# Patient Record
Sex: FEMALE | Race: WHITE | NOT HISPANIC OR LATINO | Employment: OTHER | URBAN - METROPOLITAN AREA
[De-identification: names, ages, dates, MRNs, and addresses within clinical notes are randomized per-mention and may not be internally consistent; named-entity substitution may affect disease eponyms.]

---

## 2018-05-02 RX ORDER — ZOLPIDEM TARTRATE 10 MG/1
10 TABLET ORAL NIGHTLY PRN
COMMUNITY
End: 2018-06-11 | Stop reason: SDUPTHER

## 2018-05-03 PROBLEM — Z17.22: Status: ACTIVE | Noted: 2018-05-03

## 2018-05-03 PROBLEM — C50.911: Status: ACTIVE | Noted: 2018-05-03

## 2018-05-03 PROBLEM — Z17.1: Status: ACTIVE | Noted: 2018-05-03

## 2018-05-03 RX ORDER — LANOLIN ALCOHOL/MO/W.PET/CERES
1000 CREAM (GRAM) TOPICAL 2 TIMES DAILY
COMMUNITY
End: 2018-08-14

## 2018-06-01 ENCOUNTER — TRANSCRIBE ORDERS (OUTPATIENT)
Dept: LAB | Age: 68
End: 2018-06-01

## 2018-06-01 ENCOUNTER — APPOINTMENT (OUTPATIENT)
Dept: LAB | Age: 68
End: 2018-06-01
Attending: INTERNAL MEDICINE
Payer: MEDICARE

## 2018-06-01 DIAGNOSIS — M86.9 SAPHO SYNDROME (CMS/HCC): ICD-10-CM

## 2018-06-01 DIAGNOSIS — E13.29: ICD-10-CM

## 2018-06-01 DIAGNOSIS — E53.8 BIOTIN-(PROPIONYL-COA-CARBOXYLASE) LIGASE DEFICIENCY: ICD-10-CM

## 2018-06-01 DIAGNOSIS — Z79.899 NEED FOR PROPHYLACTIC CHEMOTHERAPY: ICD-10-CM

## 2018-06-01 DIAGNOSIS — M65.90 SAPHO SYNDROME (CMS/HCC): ICD-10-CM

## 2018-06-01 DIAGNOSIS — E55.9 AVITAMINOSIS D: Primary | ICD-10-CM

## 2018-06-01 DIAGNOSIS — Z13.220 SCREENING FOR LIPOID DISORDERS: ICD-10-CM

## 2018-06-01 DIAGNOSIS — L70.9 SAPHO SYNDROME (CMS/HCC): ICD-10-CM

## 2018-06-01 DIAGNOSIS — M85.80 SAPHO SYNDROME (CMS/HCC): ICD-10-CM

## 2018-06-01 DIAGNOSIS — E55.9 AVITAMINOSIS D: ICD-10-CM

## 2018-06-01 DIAGNOSIS — L40.3 SAPHO SYNDROME (CMS/HCC): ICD-10-CM

## 2018-06-01 LAB
25(OH)D3 SERPL-MCNC: 30 NG/ML (ref 30–100)
ALBUMIN SERPL-MCNC: 4.3 G/DL (ref 3.4–5)
ALP SERPL-CCNC: 61 IU/L (ref 35–126)
ALT SERPL-CCNC: 11 IU/L (ref 11–54)
ANION GAP SERPL CALC-SCNC: 7 MEQ/L (ref 3–15)
ANISOCYTOSIS BLD QL SMEAR: ABNORMAL
AST SERPL-CCNC: 16 IU/L (ref 15–41)
BASOPHILS # BLD: 0.06 K/UL (ref 0.01–0.1)
BASOPHILS NFR BLD: 1 %
BILIRUB SERPL-MCNC: 0.7 MG/DL (ref 0.3–1.2)
BUN SERPL-MCNC: 16 MG/DL (ref 8–20)
CALCIUM SERPL-MCNC: 9.7 MG/DL (ref 8.9–10.3)
CALCIUM SERPL-MCNC: 9.7 MG/DL (ref 8.9–10.3)
CHLORIDE SERPL-SCNC: 106 MMOL/L (ref 98–109)
CHOLEST SERPL-MCNC: 188 MG/DL
CO2 SERPL-SCNC: 27 MMOL/L (ref 22–32)
CREAT SERPL-MCNC: 0.7 MG/DL (ref 0.6–1.1)
DIFFERENTIAL METHOD BLD: ABNORMAL
EOSINOPHIL # BLD: 0.11 K/UL (ref 0.04–0.36)
EOSINOPHIL NFR BLD: 2 %
ERYTHROCYTE [DISTWIDTH] IN BLOOD BY AUTOMATED COUNT: 17.8 % (ref 11.7–14.4)
GFR SERPL CREATININE-BSD FRML MDRD: >60 ML/MIN/1.73M*2
GLUCOSE SERPL-MCNC: 85 MG/DL (ref 70–99)
HCT VFR BLDCO AUTO: 39 % (ref 35–45)
HDLC SERPL-MCNC: 80 MG/DL
HDLC SERPL: 2.4 {RATIO}
HGB BLD-MCNC: 12.2 G/DL (ref 11.8–15.7)
LDLC SERPL CALC-MCNC: 94 MG/DL
LYMPHOCYTES # BLD: 1.88 K/UL (ref 1.2–3.5)
LYMPHOCYTES NFR BLD: 34 %
MCH RBC QN AUTO: 20.1 PG (ref 28–33.2)
MCHC RBC AUTO-ENTMCNC: 31.3 G/DL (ref 32.2–35.5)
MCV RBC AUTO: 64.3 FL (ref 83–98)
MICROCYTES BLD QL SMEAR: ABNORMAL
MONOCYTES # BLD: 0.17 K/UL (ref 0.28–0.8)
MONOCYTES NFR BLD: 3 %
NEUTS BAND # BLD: 3.32 K/UL (ref 1.7–7)
NEUTS SEG NFR BLD: 60 %
NONHDLC SERPL-MCNC: 108 MG/DL
PDW BLD AUTO: 9.7 FL (ref 9.4–12.3)
PLAT MORPH BLD: NORMAL
PLATELET # BLD AUTO: 289 K/UL (ref 150–369)
PLATELET # BLD EST: ABNORMAL 10*3/UL
POIKILOCYTOSIS BLD QL SMEAR: ABNORMAL
POLYCHROMASIA BLD QL SMEAR: ABNORMAL
POTASSIUM SERPL-SCNC: 4.2 MMOL/L (ref 3.6–5.1)
PROT SERPL-MCNC: 6.8 G/DL (ref 6–8.2)
PTH-INTACT SERPL-MCNC: 29.2 PG/ML (ref 12–88)
RBC # BLD AUTO: 6.07 M/UL (ref 3.93–5.22)
SODIUM SERPL-SCNC: 140 MMOL/L (ref 136–144)
TRIGL SERPL-MCNC: 71 MG/DL (ref 30–149)
TSH SERPL DL<=0.05 MIU/L-ACNC: 1.49 MIU/ML (ref 0.34–5.6)
VIT B12 SERPL-MCNC: 519 PG/ML (ref 180–914)
WBC # BLD AUTO: 5.53 K/UL (ref 3.8–10.5)

## 2018-06-01 PROCEDURE — 83970 ASSAY OF PARATHORMONE: CPT

## 2018-06-01 PROCEDURE — 80053 COMPREHEN METABOLIC PANEL: CPT

## 2018-06-01 PROCEDURE — 80061 LIPID PANEL: CPT

## 2018-06-01 PROCEDURE — 82607 VITAMIN B-12: CPT

## 2018-06-01 PROCEDURE — 82306 VITAMIN D 25 HYDROXY: CPT

## 2018-06-01 PROCEDURE — 36415 COLL VENOUS BLD VENIPUNCTURE: CPT

## 2018-06-01 PROCEDURE — 85025 COMPLETE CBC W/AUTO DIFF WBC: CPT

## 2018-06-01 PROCEDURE — 84443 ASSAY THYROID STIM HORMONE: CPT

## 2018-06-11 RX ORDER — ZOLPIDEM TARTRATE 10 MG/1
10 TABLET ORAL NIGHTLY PRN
Qty: 30 TABLET | Refills: 1 | Status: SHIPPED | OUTPATIENT
Start: 2018-06-11 | End: 2018-09-12 | Stop reason: SDUPTHER

## 2018-06-14 ENCOUNTER — APPOINTMENT (EMERGENCY)
Dept: RADIOLOGY | Facility: HOSPITAL | Age: 68
End: 2018-06-14
Attending: EMERGENCY MEDICINE
Payer: MEDICARE

## 2018-06-14 ENCOUNTER — HOSPITAL ENCOUNTER (EMERGENCY)
Facility: HOSPITAL | Age: 68
Discharge: HOME | End: 2018-06-14
Attending: EMERGENCY MEDICINE
Payer: MEDICARE

## 2018-06-14 VITALS
HEIGHT: 60 IN | RESPIRATION RATE: 18 BRPM | SYSTOLIC BLOOD PRESSURE: 106 MMHG | BODY MASS INDEX: 25.13 KG/M2 | DIASTOLIC BLOOD PRESSURE: 56 MMHG | HEART RATE: 67 BPM | TEMPERATURE: 98.1 F | WEIGHT: 128 LBS | OXYGEN SATURATION: 97 %

## 2018-06-14 DIAGNOSIS — S16.1XXA STRAIN OF NECK MUSCLE, INITIAL ENCOUNTER: ICD-10-CM

## 2018-06-14 DIAGNOSIS — S09.90XA HEAD INJURY, INITIAL ENCOUNTER: Primary | ICD-10-CM

## 2018-06-14 PROCEDURE — 99284 EMERGENCY DEPT VISIT MOD MDM: CPT | Mod: 25

## 2018-06-14 PROCEDURE — 70450 CT HEAD/BRAIN W/O DYE: CPT

## 2018-06-14 PROCEDURE — 72125 CT NECK SPINE W/O DYE: CPT

## 2018-06-14 PROCEDURE — 63700000 HC SELF-ADMINISTRABLE DRUG: Performed by: PHYSICIAN ASSISTANT

## 2018-06-14 RX ORDER — CYCLOBENZAPRINE HCL 10 MG
10 TABLET ORAL ONCE
Status: COMPLETED | OUTPATIENT
Start: 2018-06-14 | End: 2018-06-14

## 2018-06-14 RX ORDER — CYCLOBENZAPRINE HCL 10 MG
5 TABLET ORAL 2 TIMES DAILY PRN
Qty: 15 TABLET | Refills: 0 | Status: SHIPPED | OUTPATIENT
Start: 2018-06-14 | End: 2018-08-14

## 2018-06-14 RX ADMIN — CYCLOBENZAPRINE HYDROCHLORIDE 10 MG: 10 TABLET, FILM COATED ORAL at 23:03

## 2018-06-14 ASSESSMENT — ENCOUNTER SYMPTOMS
HEADACHES: 0
NAUSEA: 1
VOMITING: 0
WOUND: 1
WEAKNESS: 0
NUMBNESS: 0
BACK PAIN: 0
NECK PAIN: 1

## 2018-06-15 ENCOUNTER — TELEPHONE (OUTPATIENT)
Dept: FAMILY MEDICINE | Facility: CLINIC | Age: 68
End: 2018-06-15
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
## 2018-06-15 NOTE — TELEPHONE ENCOUNTER
Spoke with pt and performed ED f/u call.  Pt doing well. Pt fell and hit head, was told she had a mild concussion and to f/u with PCP.  Pt does not feel as though she needs a f/u visit and she is leaving for [place] on Monday. Should she just keep an eye on her sx's and f/u if needed or do you need to fit her in today prior to her traveling?

## 2018-06-15 NOTE — DISCHARGE INSTRUCTIONS
Please return to the emergency department with any new or concerning symptoms. You should take all medications as prescribed.  We have answered all of your questions and you are competent to understand.  You feel comfortable with the plan as stated.     You may have had a concussion today however you do not have symptoms of concussion at this time.  Continue to ice the area.  You should take the muscle relaxer as directed.  He will likely feel more sore tomorrow.  Follow-up with your family doctor as needed.

## 2018-07-05 ENCOUNTER — OFFICE VISIT (OUTPATIENT)
Dept: FAMILY MEDICINE | Facility: CLINIC | Age: 68
End: 2018-07-05
Payer: MEDICARE

## 2018-07-05 VITALS
SYSTOLIC BLOOD PRESSURE: 110 MMHG | DIASTOLIC BLOOD PRESSURE: 70 MMHG | RESPIRATION RATE: 16 BRPM | BODY MASS INDEX: 26.31 KG/M2 | HEART RATE: 72 BPM | WEIGHT: 134 LBS | HEIGHT: 60 IN

## 2018-07-05 DIAGNOSIS — M54.2 NECK PAIN: Primary | ICD-10-CM

## 2018-07-05 PROBLEM — C50.919 MALIGNANT NEOPLASM OF BREAST (CMS/HCC): Status: ACTIVE | Noted: 2018-05-03

## 2018-07-05 PROCEDURE — 99213 OFFICE O/P EST LOW 20 MIN: CPT | Performed by: FAMILY MEDICINE

## 2018-07-05 RX ORDER — VIT C/E/ZN/COPPR/LUTEIN/ZEAXAN 250MG-90MG
CAPSULE ORAL
COMMUNITY
Start: 2017-06-28 | End: 2018-08-14

## 2018-07-05 RX ORDER — MELOXICAM 7.5 MG/1
7.5 TABLET ORAL DAILY
Qty: 30 TABLET | Refills: 0 | Status: SHIPPED | OUTPATIENT
Start: 2018-07-05 | End: 2018-08-02 | Stop reason: SDUPTHER

## 2018-07-05 ASSESSMENT — ENCOUNTER SYMPTOMS
NECK PAIN: 1
PHOTOPHOBIA: 0
HEADACHES: 0
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
## 2018-07-11 PROBLEM — M54.2 NECK PAIN: Status: ACTIVE | Noted: 2018-07-11

## 2018-07-11 ASSESSMENT — ENCOUNTER SYMPTOMS
DIZZINESS: 0
LIGHT-HEADEDNESS: 0

## 2018-07-11 NOTE — ASSESSMENT & PLAN NOTE
Muscle strain from fall  Prescribed Flexeril 5mg 2x day as needed  Prescribed Mobic 7.5mg daily with food  Heat  Light stretches

## 2018-08-02 ENCOUNTER — TELEPHONE (OUTPATIENT)
Dept: FAMILY MEDICINE | Facility: CLINIC | Age: 68
End: 2018-08-02

## 2018-08-02 RX ORDER — MELOXICAM 7.5 MG/1
7.5 TABLET ORAL
Qty: 90 TABLET | Refills: 0 | Status: SHIPPED | OUTPATIENT
Start: 2018-08-02 | End: 2019-01-11 | Stop reason: ALTCHOICE

## 2018-08-02 RX ORDER — MELOXICAM 7.5 MG/1
TABLET ORAL
Qty: 30 TABLET | Refills: 0 | Status: SHIPPED | OUTPATIENT
Start: 2018-08-02 | End: 2018-08-02 | Stop reason: SDUPTHER

## 2018-08-14 ENCOUNTER — HOSPITAL ENCOUNTER (EMERGENCY)
Facility: HOSPITAL | Age: 68
Discharge: HOME | End: 2018-08-14
Attending: EMERGENCY MEDICINE
Payer: MEDICARE

## 2018-08-14 ENCOUNTER — TELEPHONE (OUTPATIENT)
Dept: FAMILY MEDICINE | Facility: CLINIC | Age: 68
End: 2018-08-14

## 2018-08-14 ENCOUNTER — APPOINTMENT (EMERGENCY)
Dept: RADIOLOGY | Facility: HOSPITAL | Age: 68
End: 2018-08-14
Payer: MEDICARE

## 2018-08-14 VITALS
WEIGHT: 130 LBS | OXYGEN SATURATION: 97 % | TEMPERATURE: 98.2 F | SYSTOLIC BLOOD PRESSURE: 125 MMHG | BODY MASS INDEX: 25.52 KG/M2 | HEART RATE: 81 BPM | DIASTOLIC BLOOD PRESSURE: 66 MMHG | HEIGHT: 60 IN | RESPIRATION RATE: 16 BRPM

## 2018-08-14 DIAGNOSIS — S89.92XA KNEE INJURY, LEFT, INITIAL ENCOUNTER: Primary | ICD-10-CM

## 2018-08-14 PROCEDURE — 63700000 HC SELF-ADMINISTRABLE DRUG: Performed by: NURSE PRACTITIONER

## 2018-08-14 PROCEDURE — 99283 EMERGENCY DEPT VISIT LOW MDM: CPT | Mod: 25

## 2018-08-14 PROCEDURE — 73564 X-RAY EXAM KNEE 4 OR MORE: CPT | Mod: LT

## 2018-08-14 RX ORDER — OXYCODONE AND ACETAMINOPHEN 5; 325 MG/1; MG/1
1 TABLET ORAL ONCE
Status: COMPLETED | OUTPATIENT
Start: 2018-08-14 | End: 2018-08-14

## 2018-08-14 RX ORDER — TRAMADOL HYDROCHLORIDE 50 MG/1
50 TABLET ORAL EVERY 8 HOURS PRN
Qty: 15 TABLET | Refills: 0 | Status: SHIPPED | OUTPATIENT
Start: 2018-08-14 | End: 2018-09-12 | Stop reason: ALTCHOICE

## 2018-08-14 RX ORDER — CYCLOBENZAPRINE HCL 10 MG
10 TABLET ORAL 3 TIMES DAILY PRN
Qty: 15 TABLET | Refills: 0 | Status: SHIPPED | OUTPATIENT
Start: 2018-08-14 | End: 2018-09-12 | Stop reason: ALTCHOICE

## 2018-08-14 RX ADMIN — OXYCODONE HYDROCHLORIDE AND ACETAMINOPHEN 1 TABLET: 5; 325 TABLET ORAL at 16:01

## 2018-08-14 ASSESSMENT — ENCOUNTER SYMPTOMS
PHOTOPHOBIA: 0
FEVER: 0
JOINT SWELLING: 1
CHEST TIGHTNESS: 0
HEADACHES: 0
ABDOMINAL PAIN: 0
CONFUSION: 0

## 2018-08-14 NOTE — TELEPHONE ENCOUNTER
Please let patient know that she should see Dr. White for evaluation and they will order the MRI it is up to the specialist seeing the patient whether the MRI would be the next best step.  [facility] has some walk-in hours as well.  They should call first thing in the morning and they may be able to see them tomorrow.

## 2018-08-14 NOTE — DISCHARGE INSTRUCTIONS
Wear the knee immobilizer and use a walker for assistance call and follow-up with orthopedist or primary care within the next few days for further evaluation will likely be needed due to acute injury to the left knee no fracture was noted on the x-ray

## 2018-08-14 NOTE — ED ATTESTATION NOTE
The patient was evaluated and managed by the physician assistant / nurse practitioner.       Hong Cano MD  08/14/18 9855

## 2018-08-14 NOTE — TELEPHONE ENCOUNTER
Call from . Pt is currently in [place] ER for knee problem. Was told she needs an MRI but PCP needs to order.  Per discharge instructions, pt told to call Dr. White in 1 day for appointment. (not sure if he would order the MRI or do we need to?)   Pt of Dr. Lemus.

## 2018-08-14 NOTE — ED PROVIDER NOTES
Chief Complaint   Patient presents with   • Lower Extremity Issue        HPI    Patient presents to emergency room due to acute pain in the left knee posterior states that she was walking down the stairs felt a pop and then had severe pain in the back of her knee unable to ambulate or apply pressure.  The pain is worse with any movement and is relieved with rest she has never injured that knee in the past      Past Medical History:   Diagnosis Date   • Beta thalassemia (CMS/HCC) (HCC)    • Breast cancer (CMS/HCC) (HCC)    • Osteoporosis          Past Surgical History:   Procedure Laterality Date   • EYE SURGERY     • HYSTERECTOMY     • MASTECTOMY         Family History   Problem Relation Age of Onset   • Breast cancer Mother    • Thyroid cancer Mother    • Cancer Father    • Heart disease Paternal Grandmother    • Thyroid disease Paternal Grandfather    • Cancer Paternal Grandfather        Social History   Substance Use Topics   • Smoking status: Never Smoker   • Smokeless tobacco: Never Used   • Alcohol use Yes      Comment: socially       Systems Reviewed from Nursing Triage:              Review of Systems   Constitutional: Negative for fever.   Eyes: Negative for photophobia.   Respiratory: Negative for chest tightness.    Cardiovascular: Negative for chest pain.   Gastrointestinal: Negative for abdominal pain.   Musculoskeletal: Positive for gait problem and joint swelling.   Skin: Negative for rash.   Neurological: Negative for headaches.   Psychiatric/Behavioral: Negative for confusion.            ED Triage Vitals [08/14/18 1554]   Temp Heart Rate Resp BP SpO2   36.8 °C (98.2 °F) 81 16 137/71 98 %      Temp Source Heart Rate Source Patient Position BP Location FiO2 (%) (Set)   Temporal -- Sitting Right upper arm --       Vitals:    08/14/18 1554 08/14/18 1715   BP: 137/71 125/66   BP Location: Right upper arm    Patient Position: Sitting    Pulse: 81    Resp: 16 16   Temp: 36.8 °C (98.2 °F)    TempSrc:  Temporal    SpO2: 98% 97%   Weight: 59 kg (130 lb)    Height: 1.524 m (5')                          Physical Exam   Constitutional: She is oriented to person, place, and time. She appears well-developed and well-nourished.   HENT:   Head: Normocephalic.   Eyes: Conjunctivae are normal.   Neck: Normal range of motion.   Pulmonary/Chest: Effort normal.   Neurological: She is alert and oriented to person, place, and time.   Skin: Skin is warm and dry.   Psychiatric: She has a normal mood and affect.   Nursing note and vitals reviewed.           Labs Reviewed - No data to display    X-RAY KNEE LEFT 4+ VIEWS   Final Result   IMPRESSION:   No significant abnormality.            Procedures    Final diagnoses:   [S89.92XA] Knee injury, left, initial encounter       ED Course & [facility]     ED Course as of Aug 16 1529   Tue Aug 14, 2018   1630 No acute findings on xray   Will need follow up MRI and ortho  Knee immoblizer applied  Walker -  [LB]      ED Course User Index  [LB] Cristel Albright CRNP         Clinical Impressions as of Aug 16 1529   Knee injury, left, initial encounter           [facility]    3:29 PM    Impression: lt knee pain    Plan: xray pain control    MORA Merritt  8/16/2018           Cristel Albright CRNP  08/16/18 1529

## 2018-09-12 ENCOUNTER — OFFICE VISIT (OUTPATIENT)
Dept: FAMILY MEDICINE | Facility: CLINIC | Age: 68
End: 2018-09-12
Payer: MEDICARE

## 2018-09-12 VITALS
HEART RATE: 76 BPM | RESPIRATION RATE: 16 BRPM | WEIGHT: 134 LBS | TEMPERATURE: 98.1 F | HEIGHT: 60 IN | DIASTOLIC BLOOD PRESSURE: 76 MMHG | SYSTOLIC BLOOD PRESSURE: 120 MMHG | BODY MASS INDEX: 26.31 KG/M2

## 2018-09-12 DIAGNOSIS — M25.562 CHRONIC PAIN OF LEFT KNEE: Primary | ICD-10-CM

## 2018-09-12 DIAGNOSIS — G89.29 CHRONIC PAIN OF LEFT KNEE: Primary | ICD-10-CM

## 2018-09-12 DIAGNOSIS — G47.00 INSOMNIA, UNSPECIFIED TYPE: ICD-10-CM

## 2018-09-12 PROCEDURE — 99213 OFFICE O/P EST LOW 20 MIN: CPT | Performed by: FAMILY MEDICINE

## 2018-09-12 RX ORDER — AZITHROMYCIN 250 MG/1
TABLET, FILM COATED ORAL
Qty: 6 TABLET | Refills: 0 | Status: SHIPPED | OUTPATIENT
Start: 2018-09-12 | End: 2018-12-14 | Stop reason: SDUPTHER

## 2018-09-12 RX ORDER — ZOLPIDEM TARTRATE 10 MG/1
10 TABLET ORAL NIGHTLY PRN
Qty: 30 TABLET | Refills: 0 | Status: SHIPPED | OUTPATIENT
Start: 2018-09-12 | End: 2019-01-24 | Stop reason: SDUPTHER

## 2018-09-19 PROBLEM — G47.00 INSOMNIA: Status: ACTIVE | Noted: 2018-09-19

## 2018-09-19 PROBLEM — M25.562 CHRONIC PAIN OF LEFT KNEE: Status: ACTIVE | Noted: 2018-09-19

## 2018-09-19 PROBLEM — G89.29 CHRONIC PAIN OF LEFT KNEE: Status: ACTIVE | Noted: 2018-09-19

## 2018-09-19 ASSESSMENT — ENCOUNTER SYMPTOMS
ABDOMINAL PAIN: 0
LIGHT-HEADEDNESS: 0
PALPITATIONS: 0
HEADACHES: 0
ARTHRALGIAS: 1
DIZZINESS: 0
COUGH: 0
FEVER: 0
SHORTNESS OF BREATH: 0
CHILLS: 0

## 2018-09-19 NOTE — PROGRESS NOTES
Subjective      Patient ID: Ruthy Kaminski is a 68 y.o. female.  1950      Patient presents for follow-up to her left knee pain.  She was seen by orthopedic doctor and diagnosed with a large Baker's cyst and arthritis.  She did have her knee injected.  It is feeling better.  She will be traveling abroad for 14 days.  She would like to take an anti-inflammatory with her while she is away.  She is also concerned about jet lag and like to take prescription of Ambien with her as well.        The following have been reviewed and updated as appropriate in this visit:  Tobacco  Meds  Problems  Med Hx  Surg Hx  Fam Hx  Soc Hx      Review of Systems   Constitutional: Negative for chills and fever.   Respiratory: Negative for cough and shortness of breath.    Cardiovascular: Negative for chest pain, palpitations and leg swelling.   Gastrointestinal: Negative for abdominal pain.   Musculoskeletal: Positive for arthralgias.   Neurological: Negative for dizziness, light-headedness and headaches.       Objective     Vitals:    09/12/18 1456   BP: 120/76   Pulse: 76   Resp: 16   Temp: 36.7 °C (98.1 °F)   Weight: 60.8 kg (134 lb)   Height: 1.524 m (5')     Body mass index is 26.17 kg/m².    Physical Exam   Constitutional: She appears well-developed and well-nourished.   Cardiovascular: Normal rate, regular rhythm and normal heart sounds.    No murmur heard.  Pulmonary/Chest: Effort normal and breath sounds normal. No respiratory distress. She has no wheezes.   Abdominal: Soft. Bowel sounds are normal. She exhibits no distension. There is no tenderness.       Assessment/Plan   Problem List Items Addressed This Visit     Chronic pain of left knee - Primary     Baker's cyst and arthritis  Prescribed Mobic 7.5mg daily with food         Insomnia     Jet lag from travel  Prescribed Ambien 10mg, 1/2 tablet as needed

## 2018-10-10 ENCOUNTER — APPOINTMENT (OUTPATIENT)
Dept: LAB | Facility: HOSPITAL | Age: 68
End: 2018-10-10
Attending: ANESTHESIOLOGY
Payer: MEDICARE

## 2018-10-10 ENCOUNTER — TRANSCRIBE ORDERS (OUTPATIENT)
Dept: REGISTRATION | Facility: HOSPITAL | Age: 68
End: 2018-10-10

## 2018-10-10 ENCOUNTER — HOSPITAL ENCOUNTER (OUTPATIENT)
Dept: CARDIOLOGY | Facility: HOSPITAL | Age: 68
Discharge: HOME | End: 2018-10-10
Attending: ANESTHESIOLOGY
Payer: MEDICARE

## 2018-10-10 DIAGNOSIS — Z01.812 ENCOUNTER FOR PREPROCEDURAL LABORATORY EXAMINATION: ICD-10-CM

## 2018-10-10 DIAGNOSIS — Z01.812 ENCOUNTER FOR PREPROCEDURAL LABORATORY EXAMINATION: Primary | ICD-10-CM

## 2018-10-10 DIAGNOSIS — Z01.818 ENCOUNTER FOR OTHER PREPROCEDURAL EXAMINATION: ICD-10-CM

## 2018-10-10 LAB
ANION GAP SERPL CALC-SCNC: 10 MEQ/L (ref 3–15)
BUN SERPL-MCNC: 19 MG/DL (ref 8–20)
CALCIUM SERPL-MCNC: 9.8 MG/DL (ref 8.9–10.3)
CHLORIDE SERPL-SCNC: 101 MEQ/L (ref 98–109)
CO2 SERPL-SCNC: 26 MEQ/L (ref 22–32)
CREAT SERPL-MCNC: 0.7 MG/DL (ref 0.6–1.1)
GFR SERPL CREATININE-BSD FRML MDRD: >60 ML/MIN/1.73M*2
GLUCOSE SERPL-MCNC: 94 MG/DL (ref 70–99)
POTASSIUM SERPL-SCNC: 4.3 MEQ/L (ref 3.6–5.1)
SODIUM SERPL-SCNC: 137 MEQ/L (ref 136–144)

## 2018-10-10 PROCEDURE — 93005 ELECTROCARDIOGRAM TRACING: CPT

## 2018-10-10 PROCEDURE — 80048 BASIC METABOLIC PNL TOTAL CA: CPT

## 2018-10-10 PROCEDURE — 36415 COLL VENOUS BLD VENIPUNCTURE: CPT

## 2018-10-11 LAB
ATRIAL RATE: 69
P AXIS: 74
PR INTERVAL: 172
QRS DURATION: 76
QT INTERVAL: 414
QTC CALCULATION(BAZETT): 443
R AXIS: 27
T WAVE AXIS: 44
VENTRICULAR RATE: 69

## 2018-10-15 ENCOUNTER — OFFICE VISIT (OUTPATIENT)
Dept: FAMILY MEDICINE | Facility: CLINIC | Age: 68
End: 2018-10-15
Payer: MEDICARE

## 2018-10-15 VITALS
TEMPERATURE: 98 F | WEIGHT: 133.8 LBS | DIASTOLIC BLOOD PRESSURE: 72 MMHG | HEIGHT: 60 IN | HEART RATE: 88 BPM | SYSTOLIC BLOOD PRESSURE: 102 MMHG | BODY MASS INDEX: 26.27 KG/M2 | RESPIRATION RATE: 16 BRPM

## 2018-10-15 DIAGNOSIS — J20.8 ACUTE VIRAL BRONCHITIS: Primary | ICD-10-CM

## 2018-10-15 DIAGNOSIS — C50.919 MALIGNANT NEOPLASM OF FEMALE BREAST, UNSPECIFIED ESTROGEN RECEPTOR STATUS, UNSPECIFIED LATERALITY, UNSPECIFIED SITE OF BREAST (CMS/HCC): ICD-10-CM

## 2018-10-15 PROCEDURE — 99213 OFFICE O/P EST LOW 20 MIN: CPT | Performed by: FAMILY MEDICINE

## 2018-10-15 NOTE — PROGRESS NOTES
Subjective      Patient ID: Ruthy Kaminski is a 68 y.o. female.    HPI patient reports that roughly 2 weeks ago while on a cruise she developed sore throat which progressed to runny nose nasal congestion and a cough.  She took Zithromax for 5 days which she finished roughly a week ago.  The patient continues to have a cough.  Her runny nose nasal congestion and sinus symptoms are improving significantly.  Her cough is slightly phlegmy at times.  She does not have any chest pain, shortness of breath, or fever.    Allergies  Meds  Problems       Review of Systems  Allergies   Allergen Reactions   • Keflex [Cephalexin]    • Penicillin G    • Cephalexin Monohydrate Rash     KEFLEX   • Sulfa (Sulfonamide Antibiotics) Rash     Current Outpatient Prescriptions   Medication Sig Dispense Refill   • meloxicam (MOBIC) 7.5 mg tablet Take 1 tablet (7.5 mg total) by mouth once daily. 90 tablet 0   • zolpidem (AMBIEN) 10 mg tablet Take 1 tablet (10 mg total) by mouth nightly as needed for sleep. 30 tablet 0     No current facility-administered medications for this visit.      Past Medical History:   Diagnosis Date   • Beta thalassemia (CMS/HCC)    • Breast cancer (CMS/HCC) ([facility])    • Osteoporosis      Past Surgical History:   Procedure Laterality Date   • EYE SURGERY     • HYSTERECTOMY     • MASTECTOMY       Social History   Substance Use Topics   • Smoking status: Never Smoker   • Smokeless tobacco: Never Used   • Alcohol use Yes      Comment: socially     Family History   Problem Relation Age of Onset   • Breast cancer Mother    • Thyroid cancer Mother    • Polymyalgia rheumatica Mother    • Cancer Father    • Heart disease Paternal Grandmother    • Thyroid disease Paternal Grandfather    • Cancer Paternal Grandfather        Objective   Vitals:    10/15/18 1511   BP: 102/72   Pulse: 88   Resp: 16   Temp: 36.7 °C (98 °F)   Weight: 60.7 kg (133 lb 12.8 oz)   Height: 1.524 m (5')    Body mass index is 26.13 kg/m².  Physical Exam    Constitutional: She appears well-developed.  Non-toxic appearance. No distress.   HENT:   Head: Normocephalic.   Right Ear: Tympanic membrane, external ear and ear canal normal.   Left Ear: Tympanic membrane, external ear and ear canal normal.   Nose: Nose normal.   Mouth/Throat: Uvula is midline, oropharynx is clear and moist and mucous membranes are normal. No oral lesions. No oropharyngeal exudate, posterior oropharyngeal edema, posterior oropharyngeal erythema or tonsillar abscesses.   Tongue normal   Eyes: Conjunctivae and lids are normal.   Neck: Neck supple. No thyroid mass and no thyromegaly present.   Cardiovascular: Normal rate, regular rhythm and normal heart sounds.    No murmur heard.  Pulmonary/Chest: Effort normal and breath sounds normal. No respiratory distress.   Lymphadenopathy:        Head (right side): No submental, no submandibular, no preauricular, no posterior auricular and no occipital adenopathy present.        Head (left side): No submental, no submandibular, no preauricular, no posterior auricular and no occipital adenopathy present.     She has no cervical adenopathy.   Psychiatric: She has a normal mood and affect.   Alert and oriented       Assessment/Plan   Problem List Items Addressed This Visit     Malignant neoplasm of breast (CMS/HCC) (HCC)      Other Visit Diagnoses     Acute viral bronchitis    -  Primary      Patient's chronic conditions are stable although she did have rupture of implants and has reconstructive surgery planned in a little over a week.  I believe the patient's symptoms are all viral, inflammatory in nature, her exam was normal and she has been treated already with Zithromax.  Therefore I recommend supportive treatment and expect resolution in the next week.  If symptoms do not soon improve, follow-up for further evaluation.    Patient agrees and verbalizes understanding of medication and treatment plan discussed at today's visit.  Patient was instructed to  call or follow-up if symptoms persist or worsen.    No notes on file    Jayme Donald, DO    This note was created with voice recognition software. Inadvertent dictation errors should be disregarded. Please contact my office for clarification.

## 2018-10-23 PROCEDURE — 88304 TISSUE EXAM BY PATHOLOGIST: CPT | Performed by: PLASTIC SURGERY

## 2018-10-24 ENCOUNTER — LAB REQUISITION (OUTPATIENT)
Dept: LAB | Facility: HOSPITAL | Age: 68
End: 2018-10-24
Payer: MEDICARE

## 2018-10-24 DIAGNOSIS — Z85.3 PERSONAL HISTORY OF MALIGNANT NEOPLASM OF BREAST: ICD-10-CM

## 2018-10-25 LAB
CASE RPRT: NORMAL
CLINICAL INFO: NORMAL
PATH REPORT.FINAL DX SPEC: NORMAL
PATH REPORT.GROSS SPEC: NORMAL

## 2018-11-27 ENCOUNTER — TRANSCRIBE ORDERS (OUTPATIENT)
Dept: SCHEDULING | Age: 68
End: 2018-11-27

## 2018-11-27 DIAGNOSIS — M85.80 OTHER SPECIFIED DISORDERS OF BONE DENSITY AND STRUCTURE, UNSPECIFIED SITE: Primary | ICD-10-CM

## 2018-12-14 RX ORDER — AZITHROMYCIN 250 MG/1
TABLET, FILM COATED ORAL
Qty: 6 TABLET | Refills: 0 | Status: SHIPPED | OUTPATIENT
Start: 2018-12-14 | End: 2019-08-21 | Stop reason: SDUPTHER

## 2018-12-14 RX ORDER — ALPRAZOLAM 0.25 MG/1
0.25 TABLET ORAL 2 TIMES DAILY PRN
Qty: 20 TABLET | Refills: 0 | Status: CANCELLED | OUTPATIENT
Start: 2018-12-14 | End: 2019-01-13

## 2018-12-14 RX ORDER — AZITHROMYCIN 250 MG/1
TABLET, FILM COATED ORAL
Qty: 6 TABLET | Refills: 0 | Status: CANCELLED | OUTPATIENT
Start: 2018-12-14 | End: 2018-12-19

## 2018-12-14 RX ORDER — ALPRAZOLAM 0.25 MG/1
0.25 TABLET ORAL 2 TIMES DAILY PRN
Qty: 20 TABLET | Refills: 0 | Status: SHIPPED | OUTPATIENT
Start: 2018-12-14 | End: 2019-05-20 | Stop reason: SDUPTHER

## 2019-01-07 ENCOUNTER — APPOINTMENT (OUTPATIENT)
Dept: LAB | Age: 69
End: 2019-01-07
Attending: INTERNAL MEDICINE
Payer: MEDICARE

## 2019-01-07 ENCOUNTER — TRANSCRIBE ORDERS (OUTPATIENT)
Dept: LAB | Age: 69
End: 2019-01-07

## 2019-01-07 DIAGNOSIS — C50.911 MALIGNANT NEOPLASM OF RIGHT FEMALE BREAST (CMS/HCC): Primary | ICD-10-CM

## 2019-01-07 DIAGNOSIS — C50.912 MALIGNANT NEOPLASM OF LEFT FEMALE BREAST (CMS/HCC): ICD-10-CM

## 2019-01-07 DIAGNOSIS — C50.911 MALIGNANT NEOPLASM OF RIGHT FEMALE BREAST (CMS/HCC): ICD-10-CM

## 2019-01-07 LAB
ALBUMIN SERPL-MCNC: 4.3 G/DL (ref 3.4–5)
ALP SERPL-CCNC: 64 IU/L (ref 35–126)
ALT SERPL-CCNC: 12 IU/L (ref 11–54)
ANION GAP SERPL CALC-SCNC: 9 MEQ/L (ref 3–15)
ANISOCYTOSIS BLD QL SMEAR: NORMAL
AST SERPL-CCNC: 19 IU/L (ref 15–41)
BASOPHILS # BLD: 0.04 K/UL (ref 0.01–0.1)
BASOPHILS NFR BLD: 0.6 %
BILIRUB SERPL-MCNC: 0.6 MG/DL (ref 0.3–1.2)
BUN SERPL-MCNC: 13 MG/DL (ref 8–20)
CALCIUM SERPL-MCNC: 9.7 MG/DL (ref 8.9–10.3)
CHLORIDE SERPL-SCNC: 103 MEQ/L (ref 98–109)
CO2 SERPL-SCNC: 27 MEQ/L (ref 22–32)
CREAT SERPL-MCNC: 0.8 MG/DL
DIFFERENTIAL METHOD BLD: NORMAL
EOSINOPHIL # BLD: 0.08 K/UL (ref 0.04–0.36)
EOSINOPHIL NFR BLD: 1.2 %
ERYTHROCYTE [DISTWIDTH] IN BLOOD BY AUTOMATED COUNT: 17.6 % (ref 11.7–14.4)
GFR SERPL CREATININE-BSD FRML MDRD: >60 ML/MIN/1.73M*2
GLUCOSE SERPL-MCNC: 94 MG/DL (ref 70–99)
HCT VFR BLDCO AUTO: 40.4 %
HGB BLD-MCNC: 12.4 G/DL
IMM GRANULOCYTES # BLD AUTO: 0.01 K/UL (ref 0–0.08)
IMM GRANULOCYTES NFR BLD AUTO: 0.1 %
LYMPHOCYTES # BLD: 2.23 K/UL (ref 1.2–3.5)
LYMPHOCYTES NFR BLD: 33.3 %
MCH RBC QN AUTO: 19.8 PG (ref 28–33.2)
MCHC RBC AUTO-ENTMCNC: 30.7 G/DL (ref 32.2–35.5)
MCV RBC AUTO: 64.4 FL (ref 83–98)
MICROCYTES BLD QL SMEAR: NORMAL
MONOCYTES # BLD: 0.4 K/UL (ref 0.28–0.8)
MONOCYTES NFR BLD: 6 %
NEUTROPHILS # BLD: 3.94 K/UL (ref 1.7–7)
NEUTS SEG NFR BLD: 58.8 %
NRBC BLD-RTO: 0 %
PDW BLD AUTO: 10 FL (ref 9.4–12.3)
PLAT MORPH BLD: NORMAL
PLATELET # BLD AUTO: 308 K/UL
PLATELET # BLD EST: NORMAL 10*3/UL
POIKILOCYTOSIS BLD QL SMEAR: NORMAL
POLYCHROMASIA BLD QL SMEAR: NORMAL
POTASSIUM SERPL-SCNC: 4 MEQ/L (ref 3.6–5.1)
PROT SERPL-MCNC: 7.1 G/DL (ref 6–8.2)
RBC # BLD AUTO: 6.27 M/UL (ref 3.93–5.22)
SODIUM SERPL-SCNC: 139 MEQ/L (ref 136–144)
TARGETS BLD QL SMEAR: NORMAL
WBC # BLD AUTO: 6.7 K/UL

## 2019-01-07 PROCEDURE — 80053 COMPREHEN METABOLIC PANEL: CPT

## 2019-01-07 PROCEDURE — 85025 COMPLETE CBC W/AUTO DIFF WBC: CPT

## 2019-01-07 PROCEDURE — 36415 COLL VENOUS BLD VENIPUNCTURE: CPT

## 2019-01-11 ENCOUNTER — OFFICE VISIT (OUTPATIENT)
Dept: HEMATOLOGY/ONCOLOGY | Facility: CLINIC | Age: 69
End: 2019-01-11
Attending: INTERNAL MEDICINE
Payer: MEDICARE

## 2019-01-11 VITALS
TEMPERATURE: 98.4 F | HEART RATE: 62 BPM | BODY MASS INDEX: 24.65 KG/M2 | WEIGHT: 126.2 LBS | SYSTOLIC BLOOD PRESSURE: 111 MMHG | DIASTOLIC BLOOD PRESSURE: 82 MMHG

## 2019-01-11 DIAGNOSIS — C50.912 MALIGNANT NEOPLASM OF LEFT BREAST IN FEMALE, ESTROGEN RECEPTOR NEGATIVE, UNSPECIFIED SITE OF BREAST (CMS/HCC): Primary | ICD-10-CM

## 2019-01-11 DIAGNOSIS — Z17.1 MALIGNANT NEOPLASM OF LEFT BREAST IN FEMALE, ESTROGEN RECEPTOR NEGATIVE, UNSPECIFIED SITE OF BREAST (CMS/HCC): Primary | ICD-10-CM

## 2019-01-11 PROCEDURE — 99214 OFFICE O/P EST MOD 30 MIN: CPT | Performed by: INTERNAL MEDICINE

## 2019-01-11 ASSESSMENT — ENCOUNTER SYMPTOMS
FEVER: 0
HOT FLASHES: 0
RESPIRATORY NEGATIVE: 1
APPETITE CHANGE: 0
CARDIOVASCULAR NEGATIVE: 1
FATIGUE: 1
NERVOUS/ANXIOUS: 0
UNEXPECTED WEIGHT CHANGE: 0
HEMATOLOGIC/LYMPHATIC NEGATIVE: 1
GASTROINTESTINAL NEGATIVE: 1
DIAPHORESIS: 0
PSYCHIATRIC NEGATIVE: 1
NEUROLOGICAL NEGATIVE: 1
CHILLS: 0

## 2019-01-11 ASSESSMENT — PAIN SCALES - GENERAL: PAINLEVEL: 5

## 2019-01-11 NOTE — PROGRESS NOTES
Review of Systems   Constitutional: Positive for fatigue. Negative for appetite change, chills, diaphoresis, fever and unexpected weight change.   HENT:  Negative.    Respiratory: Negative.    Cardiovascular: Negative.    Gastrointestinal: Negative.    Endocrine: Negative for hot flashes.   Genitourinary: Negative.     Musculoskeletal:        Ache behind L breast - worse when laying flat started a few weeks ago   Skin: Negative.    Neurological: Negative.    Hematological: Negative.    Psychiatric/Behavioral: Negative.  Depression: recent loss of father before holidays. Sleep disturbance: ambien helps. The patient is not nervous/anxious.      Had implants replaced in the Fall by Dr. Pittman. Has f/u appt with Dr. Ann.

## 2019-01-11 NOTE — LETTER
January 11, 2019    Patient: Ruthy Kaminski   YOB: 1950   Date of Visit: 1/11/2019       Dear Dr. Lemus:    The patient is seen back at the [facility] IN [place] today in follow up with regard to her   1. Malignant neoplasm of left breast in female, estrogen receptor negative, unspecified site of breast (CMS/HCC)    . Attached is my assessment and plan of care.         Sincerely,        Marco Antonio Aguillon,     CC: No Recipients

## 2019-01-11 NOTE — ASSESSMENT & PLAN NOTE
Patient returns for yearly evaluation with regard to her history of left breast cancer.  Since her visit a year ago clinically she is doing well.  She did have rupture of her implants and need to be replaced in October 2018.  This point time based on her history, physical exam and laboratory is no evidence of any recurrent disease.  Repeat evaluation will be scheduled for 1 year.

## 2019-01-11 NOTE — PROGRESS NOTES
Ruthy Kaminski is a 68 y.o. 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   • Malignant neoplasm of left breast in female, estrogen receptor negative, unspecified site of breast (CMS/HCC) Yes       Cancer Staging  Malignant neoplasm of breast (CMS/HCC) (HCC)  Staging form: Breast, AJCC 8th Edition  - Clinical: No stage assigned - Unsigned  - Pathologic stage from 2019: pT1c, pN0(sn), cM0, ER: Negative, LA: Negative, HER2: Negative - Signed by Marco Antonio Aguillon DO on 2019      Oncology History      Diagnoses:  1. : Left breast cancer, infiltrating ductal ER/LA-negative, HER 2-negative, lymph node negative disease.  2. 2008: Bilateral ductal carcinoma in situ.  3. 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    Treatment History:  1. 10/21/05-06: Adjuvant dose dense Adriamycin, cyclophosphamide and paclitaxel.  2. 10/28/08: Bilateral mastectomy.  3. 06: Bilateral salpingo oophorectomy.            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  She has had oophorectomy in the past.  Since her visit a year ago clinically she is doing well.  She did have a rupture of her breast implants these were replaced in 2018 by Dr. Pittman.  Still some mild tenderness in the left side.    She is still dealing with the death of her father last month.  She is however able to perform her activities of daily living.  She has good appetite and remains quite active.   She noticed no changes in her chest wall.  She has no bony type discomfort.      Ruthy Kaminski is seen today in follow up.      Review of Systems - Oncology  Review of Systems   Constitutional: Positive for fatigue. Negative for appetite change, chills, diaphoresis, fever and unexpected weight change.   HENT:  Negative.    Respiratory: Negative.    Cardiovascular: Negative.    Gastrointestinal: Negative.    Endocrine: Negative for hot flashes.   Genitourinary: Negative.     Musculoskeletal:        Ache behind L breast - worse when laying flat started a few weeks ago   Skin: Negative.    Neurological: Negative.    Hematological: Negative.    Psychiatric/Behavioral: Negative.  Depression: recent loss of father before holidays. Sleep disturbance: ambien helps. The patient is not nervous/anxious.      Review of Systems:  Nursing assessment reviewed. Pertinent positive and negative symptoms noted in HPI, all others negative.      Temp:  [36.9 °C (98.4 °F)] 36.9 °C (98.4 °F)  Heart Rate:  [62] 62  BP: (111)/(82) 111/82  /82 (BP Location: Right upper arm, Patient Position: Sitting)   Pulse 62   Temp 36.9 °C (98.4 °F) (Oral)   Wt 57.2 kg (126 lb 3.2 oz)   BMI 24.65 kg/m²   No Data Recorded  Physical Exam   Constitutional: She is oriented to person, place, and time. She appears well-developed and well-nourished.   Patient's ECOG performance status is 0   HENT:   Head: Normocephalic and atraumatic.   Eyes: Conjunctivae are normal.   Cardiovascular: Normal rate, regular rhythm and normal heart sounds.    No murmur heard.  Pulmonary/Chest: Effort normal and breath sounds normal. She has no wheezes. She has no rales.       Patient had bilateral mastectomies with the new implants from October 2018.  Incisions are well-healed.  There is no lesions in the skin or chest wall.  No axial adenopathy is noted in either axilla.   Abdominal: Soft. Bowel sounds are normal. She exhibits no mass. There is no tenderness.    Musculoskeletal: She exhibits no edema.   There is no tenderness percussion of the thoracic or lumbar spine.   Lymphadenopathy:     She has no cervical adenopathy.   Neurological: She is alert and oriented to person, place, and time.   Skin: Skin is warm and dry. She is not diaphoretic.   Psychiatric: She has a normal mood and affect. Her behavior is normal. Thought content normal.   Nursing note and vitals reviewed.      Past Medical History:   Diagnosis Date   • Beta thalassemia (CMS/HCC)    • Breast cancer (CMS/HCC) ([facility])    • Osteoporosis        OB History   No data available     Gynecologic History:  Age at menarche: No age on file  Age at first live birth: No age on file   Age at menopause: No age on file    Past Surgical History:   Procedure Laterality Date   • EYE SURGERY     • HYSTERECTOMY     • MASTECTOMY         Social History   Substance Use Topics   • Smoking status: Never Smoker   • Smokeless tobacco: Never Used   • Alcohol use Yes      Comment: socially       Family History   Problem Relation Age of Onset   • Breast cancer Mother    • Thyroid cancer Mother    • Polymyalgia rheumatica Mother    • Cancer Father    • Heart disease Paternal Grandmother    • Thyroid disease Paternal Grandfather    • Cancer Paternal Grandfather          Allergies  Keflex [cephalexin]; Penicillin g; Cephalexin monohydrate; and Sulfa (sulfonamide antibiotics)    Medications    Current Outpatient Prescriptions:   •  ALPRAZolam (XANAX) 0.25 mg tablet, Take 1 tablet (0.25 mg total) by mouth 2 (two) times a day as needed for anxiety., Disp: 20 tablet, Rfl: 0  •  zolpidem (AMBIEN) 10 mg tablet, Take 1 tablet (10 mg total) by mouth nightly as needed for sleep., Disp: 30 tablet, Rfl: 0     Laboratory    Recent Results (from the past 504 hour(s))   Comprehensive metabolic panel    Collection Time: 01/07/19 11:21 AM   Result Value Ref Range    Sodium 139 136 - 144 mEQ/L    Potassium 4.0 3.6 - 5.1 mEQ/L    Chloride 103 98 - 109 mEQ/L     CO2 27 22 - 32 mEQ/L    BUN 13 8 - 20 mg/dL    Creatinine 0.8 0.6 - 1.1 mg/dL    Glucose 94 70 - 99 mg/dL    Calcium 9.7 8.9 - 10.3 mg/dL    AST (SGOT) 19 15 - 41 IU/L    ALT (SGPT) 12 11 - 54 IU/L    Alkaline Phosphatase 64 35 - 126 IU/L    Total Protein 7.1 6.0 - 8.2 g/dL    Albumin 4.3 3.4 - 5.0 g/dL    Bilirubin, Total 0.6 0.3 - 1.2 mg/dL    eGFR >60.0 >=60.0 mL/min/1.73m*2    Anion Gap 9 3 - 15 mEQ/L   CBC    Collection Time: 01/07/19 11:21 AM   Result Value Ref Range    WBC 6.70 3.80 - 10.50 K/uL    RBC 6.27 (H) 3.93 - 5.22 M/uL    Hemoglobin 12.4 11.8 - 15.7 g/dL    Hematocrit 40.4 35.0 - 45.0 %    MCV 64.4 (L) 83.0 - 98.0 fL    MCH 19.8 (L) 28.0 - 33.2 pg    MCHC 30.7 (L) 32.2 - 35.5 g/dL    RDW 17.6 (H) 11.7 - 14.4 %    Platelets 308 150 - 369 K/uL    MPV 10.0 9.4 - 12.3 fL   Diff Count    Collection Time: 01/07/19 11:21 AM   Result Value Ref Range    Differential Type Auto     nRBC 0.0 <=0.0 %    Immature Granulocytes 0.1 %    Neutrophils 58.8 %    Lymphocytes 33.3 %    Monocytes 6.0 %    Eosinophils 1.2 %    Basophils 0.6 %    Immature Granulocytes, Absolute 0.01 0.00 - 0.08 K/uL    Neutrophils, Absolute 3.94 1.70 - 7.00 K/uL    Lymphocytes, Absolute 2.23 1.20 - 3.50 K/uL    Monocytes, Absolute 0.40 0.28 - 0.80 K/uL    Eosinophils, Absolute 0.08 0.04 - 0.36 K/uL    Basophils, Absolute 0.04 0.01 - 0.10 K/uL    Anisocytosis 1+     Microcytes 1+     Poikilocytes 1+     Polychromasia Occasional     Target Cells Occasional     Platelet Estimate Adequate (150,000-400,000)     PLT Morphology Normal            Assessment and Plan  Malignant neoplasm of breast (CMS/HCC) (HCC)  Patient returns for yearly evaluation with regard to her history of left breast cancer.  Since her visit a year ago clinically she is doing well.  She did have rupture of her implants and need to be replaced in October 2018.  This point time based on her history, physical exam and laboratory is no evidence of any recurrent disease.   Repeat evaluation will be scheduled for 1 year.      Marco Antonio Aguillon DO MS  Medical Director, [facility]

## 2019-01-24 RX ORDER — ZOLPIDEM TARTRATE 10 MG/1
10 TABLET ORAL NIGHTLY PRN
Qty: 30 TABLET | Refills: 3 | Status: CANCELLED | OUTPATIENT
Start: 2019-01-24

## 2019-01-24 RX ORDER — ZOLPIDEM TARTRATE 10 MG/1
10 TABLET ORAL NIGHTLY PRN
Qty: 30 TABLET | Refills: 3 | Status: SHIPPED | OUTPATIENT
Start: 2019-01-24 | End: 2019-08-21 | Stop reason: SDUPTHER

## 2019-03-05 ENCOUNTER — OFFICE VISIT (OUTPATIENT)
Dept: FAMILY MEDICINE | Facility: CLINIC | Age: 69
End: 2019-03-05
Payer: MEDICARE

## 2019-03-05 VITALS
RESPIRATION RATE: 16 BRPM | BODY MASS INDEX: 24.77 KG/M2 | SYSTOLIC BLOOD PRESSURE: 122 MMHG | TEMPERATURE: 98.3 F | DIASTOLIC BLOOD PRESSURE: 80 MMHG | HEIGHT: 60 IN | WEIGHT: 126.2 LBS | HEART RATE: 60 BPM

## 2019-03-05 DIAGNOSIS — Z00.00 MEDICARE ANNUAL WELLNESS VISIT, SUBSEQUENT: Primary | ICD-10-CM

## 2019-03-05 PROBLEM — M71.22 BAKER'S CYST OF KNEE, LEFT: Status: ACTIVE | Noted: 2018-09-19

## 2019-03-05 PROCEDURE — G0439 PPPS, SUBSEQ VISIT: HCPCS | Performed by: FAMILY MEDICINE

## 2019-03-05 RX ORDER — MELOXICAM 7.5 MG/1
7.5 TABLET ORAL DAILY
Qty: 30 TABLET | Refills: 0 | Status: SHIPPED | OUTPATIENT
Start: 2019-03-05 | End: 2022-06-03

## 2019-03-05 ASSESSMENT — ENCOUNTER SYMPTOMS
FREQUENCY: 0
VOMITING: 0
DIARRHEA: 0
NAUSEA: 0
CONSTIPATION: 0
DIZZINESS: 0
SINUS PAIN: 0
NERVOUS/ANXIOUS: 0
BACK PAIN: 0
ABDOMINAL PAIN: 0
SHORTNESS OF BREATH: 0
SINUS PRESSURE: 0
NECK PAIN: 0
COUGH: 0
SLEEP DISTURBANCE: 0
PALPITATIONS: 0
ADENOPATHY: 0
DIFFICULTY URINATING: 0
POLYDIPSIA: 0
CHILLS: 0
HEADACHES: 0
POLYPHAGIA: 0
FEVER: 0
BRUISES/BLEEDS EASILY: 0

## 2019-03-05 ASSESSMENT — ACTIVITIES OF DAILY LIVING (ADL)
ADEQUATE_TO_COMPLETE_ADL: YES
PATIENT'S MEMORY ADEQUATE TO SAFELY COMPLETE DAILY ACTIVITIES?: YES

## 2019-03-05 NOTE — PROGRESS NOTES
Subjective      Patient ID: Ruthy Kaminski is a 68 y.o. female.  1950      HPI    The following have been reviewed and updated as appropriate in this visit:       Review of Systems   Constitutional: Negative for chills and fever.   HENT: Negative for dental problem, sinus pain and sinus pressure.    Eyes: Negative for visual disturbance.   Respiratory: Negative for cough and shortness of breath.    Cardiovascular: Negative for chest pain, palpitations and leg swelling.   Gastrointestinal: Negative for abdominal pain, constipation, diarrhea, nausea and vomiting.   Endocrine: Negative for cold intolerance, heat intolerance, polydipsia, polyphagia and polyuria.   Genitourinary: Negative for difficulty urinating, frequency and urgency.   Musculoskeletal: Negative for back pain and neck pain.   Allergic/Immunologic: Negative for environmental allergies and food allergies.   Neurological: Negative for dizziness and headaches.   Hematological: Negative for adenopathy. Does not bruise/bleed easily.   Psychiatric/Behavioral: Negative for behavioral problems and sleep disturbance. The patient is not nervous/anxious.        Objective     Vitals:    03/05/19 0759   BP: 122/80   Pulse: 60   Resp: 16   Temp: 36.8 °C (98.3 °F)   Weight: 57.2 kg (126 lb 3.2 oz)   Height: 1.524 m (5')     Body mass index is 24.65 kg/m².    Physical Exam   Constitutional: She is oriented to person, place, and time. She appears well-developed and well-nourished.   HENT:   Head: Normocephalic and atraumatic.   Nose: Nose normal.   Mouth/Throat: Oropharynx is clear and moist.   Eyes: Conjunctivae and EOM are normal. Pupils are equal, round, and reactive to light.   Neck: Neck supple. No thyromegaly present.   Cardiovascular: Normal rate, regular rhythm and normal heart sounds.    No murmur heard.  Pulmonary/Chest: Effort normal and breath sounds normal. She has no wheezes.   Abdominal: Soft. Bowel sounds are normal. There is no tenderness.    Musculoskeletal: Normal range of motion.   Lymphadenopathy:     She has no cervical adenopathy.   Neurological: She is alert and oriented to person, place, and time.   Skin: Skin is warm and dry. No rash noted.   Psychiatric: She has a normal mood and affect. Her behavior is normal.       Assessment/Plan   Diagnoses and all orders for this visit:    Medicare annual wellness visit, subsequent (Primary)  Assessment & Plan:  67 yo female presents for annual wellness visit  Forms completed, reviewed and scanned      Other orders  -     meloxicam (MOBIC) 7.5 mg tablet; Take 1 tablet (7.5 mg total) by mouth daily.        Subjective     Ruthy Kaminski is a 68 y.o. female who presents for a subsequent annual wellness visit.     Comprehensive Medical and Social History  Patient Active Problem List   Diagnosis   • Malignant neoplasm of breast (CMS/HCC) ([facility])   • Beta thalassemia trait   • Neck pain   • Baker's cyst of knee, left   • Insomnia   • Medicare annual wellness visit, subsequent     Past Medical History:   Diagnosis Date   • Baker's cyst of knee, left 9/19/2018   • Beta thalassemia (CMS/[facility])    • Breast cancer (CMS/[facility]) ([facility])    • Osteoporosis      Past Surgical History:   Procedure Laterality Date   • EYE SURGERY     • HYSTERECTOMY     • MASTECTOMY       Allergies   Allergen Reactions   • Keflex [Cephalexin]    • Penicillin G    • Cephalexin Monohydrate Rash     KEFLEX   • Sulfa (Sulfonamide Antibiotics) Rash     Current Outpatient Prescriptions   Medication Sig Dispense Refill   • meloxicam (MOBIC) 7.5 mg tablet Take 1 tablet (7.5 mg total) by mouth daily. 30 tablet 0   • zolpidem (AMBIEN) 10 mg tablet Take 1 tablet (10 mg total) by mouth nightly as needed for sleep. 30 tablet 3     No current facility-administered medications for this visit.      Social History     Social History   • Marital status:      Spouse name: N/A   • Number of children: N/A   • Years of education: N/A     Social History Main Topics    • Smoking status: Never Smoker   • Smokeless tobacco: Never Used   • Alcohol use Yes      Comment: socially   • Drug use: No   • Sexual activity: Not on file     Other Topics Concern   • Not on file     Social History Narrative   • No narrative on file       Objective   Vitals  Vitals:    03/05/19 0759   BP: 122/80   Pulse: 60   Resp: 16   Temp: 36.8 °C (98.3 °F)   Weight: 57.2 kg (126 lb 3.2 oz)   Height: 1.524 m (5')     Body mass index is 24.65 kg/m².    Advanced Care Plan  Does patient have advance directive?: Yes       Patient has Advance Directive: Patient has Advance Directive, has not brought in                             PHQ  Over the Past 2 Weeks, Have You Felt Down, Depressed or Hopeless?: no  Over the Past 2 Weeks, Have You Felt Little Interest or Pleasure In Doing Things?: no                                          Mini Cog       Get Up and Go  Result: Pass            STEADI Falls Risk  One or more falls in the last year: No           Has trouble stepping up onto a curb: No   Advised to use a cane or walker to get around safely: No   Often has to rush to the toilet: No   Feels unsteady when walking: No   Has lost some feeling in feet: No   Often feels sad or depressed: No   Steadies self on furniture while walking at home: No   Takes medication that makes him/her feel lightheaded or more tired than usual: No   Worried about falling: No   Takes medicine to sleep or improve mood: No   Needs to push with hands when rising from a chair: No   Falls screen completed: No       Hearing and Vision Screening  No exam data present      Assessment/Plan   Diagnoses and all orders for this visit:    Medicare annual wellness visit, subsequent (Primary)  Assessment & Plan:  67 yo female presents for annual wellness visit  Forms completed, reviewed and scanned      Other orders  -     meloxicam (MOBIC) 7.5 mg tablet; Take 1 tablet (7.5 mg total) by mouth daily.      See Patient Instructions (the written plan) which  was given to the patient for PPPS and health risk factors with interventions.

## 2019-03-05 NOTE — PATIENT INSTRUCTIONS
Women's Personalized Prevention Plan Services (PPPS)      Preventive Services Checklist (Assumes Average Risk Unless Otherwise Noted)  Preventive Service Target Population and Frequency Last Done Date Due   Abd Aortic Aneurysm Screening Family history of AAA (covered once) NA    Alcohol Misuse Screening As necessary for those at risk (covered annually) NA    Breast Cancer Screening Mammogram every 1-2yrs 50-71yo; every 1-2yrs 35-48yo if patient desires after weighing potential harms and benefits (covered once 35-38yo, annually >=39yo) 8/21/18    Cholesterol Screening Both risk factors: 1) >=19yo and 2)  increased risk coronary artery disease (covered every 5 years) 3/5/19    Colorectal Cancer Screening >=49yo: Colonoscopy every 10 years, FIT annually or Cologuard every 3 years (up to 86yo for Cologuard) 3/1/18    Diabetes Screening Any 1 risk factor: hypertension, dyslipidemia, obesity, high glucose; or Any 2 risk factors: >=64 yo, overweight, family history diabetes, history gestational diabetes or delivery of infant >9lbs (covered every 6 months) 3/5/19    Glaucoma Screening Any 1 risk factor: 1) diabetes, 2) family history glaucoma, 3)  >=49yo, 4)  American >=64yo (covered annually) UTD    Hepatitis C Screening Any 1 risk factor: 1) born between [id number], 2) blood transfusion before 1992, 3) current or past injection drug use (covered once for average risk, annually for high risk) 6/3/16    Lung Cancer Screening Low-dose chest CT if all 3 risk factors: 1) 55-76yo, 2) smoker or quit within last 15y, 3) >=30 pack years (covered annually) NA    Osteoporosis Screening >=64yo (covered every 24 months)  <64yo if any 1 risk factor: 1) estrogen deficient and at risk for osteoporosis; 2) established osteoporosis on treatment; 3) x-rays show possible osteoporosis, osteopenia or vertebral fractures; 4) on steroid or planning to start; 5) primary hyperparathyroidism  "(covered as medically necessary) 10/26/16    Sexually Transmitted Diseases (STDs) As necessary chlamydia, gonorrhea, syphilis, hepatitis B (covered annually if not pregnant, more often in pregnancy)  HIV if any 1 risk factor present: 1) <16yo or >64yo and at increased risk, 2) 15-64yo and ask for it, or 3) pregnant (covered annually if not pregnant, up to 3x in pregnancy) NA    Vaccine: Hepatitis B As necessary if medium or high risk (series covered once) NA    Vaccine: Influenza All patients without contraindications (covered annually.)     Vaccine: Pneumococcal Pneumovax + Prevnar (both covered, >=11 months apart) 8/6/14  6/3/16    Vaccine: Shingrix (Shingles) >= 51yo without contraindications             (2 doses, 2 months apart) 2015    Other Services               Women's Personalized Prevention Plan Services (PPPS)                                           Health Risk Factors and Interventions  \"x\" Indicates risk is associated with this patient Risk Factor Recommended Interventions     Obesity     Hypertension     Diabetes     Fall Risk     Tobacco Use     Other:          "

## 2019-04-10 RX ORDER — MELOXICAM 7.5 MG/1
TABLET ORAL
Qty: 30 TABLET | OUTPATIENT
Start: 2019-04-10

## 2019-05-20 RX ORDER — ALPRAZOLAM 0.25 MG/1
0.25 TABLET ORAL 2 TIMES DAILY PRN
Qty: 20 TABLET | Refills: 0 | Status: SHIPPED | OUTPATIENT
Start: 2019-05-20 | End: 2019-06-19

## 2019-08-20 ENCOUNTER — TELEPHONE (OUTPATIENT)
Dept: FAMILY MEDICINE | Facility: CLINIC | Age: 69
End: 2019-08-20

## 2019-08-21 RX ORDER — AZITHROMYCIN 250 MG/1
TABLET, FILM COATED ORAL
Qty: 6 TABLET | Refills: 0 | Status: SHIPPED | OUTPATIENT
Start: 2019-08-21 | End: 2019-08-26

## 2019-08-21 RX ORDER — ZOLPIDEM TARTRATE 10 MG/1
10 TABLET ORAL NIGHTLY PRN
Qty: 30 TABLET | Refills: 3 | Status: SHIPPED | OUTPATIENT
Start: 2019-08-21 | End: 2020-02-19

## 2019-08-26 ENCOUNTER — TELEPHONE (OUTPATIENT)
Dept: GENETICS | Facility: HOSPITAL | Age: 69
End: 2019-08-26

## 2019-08-26 NOTE — TELEPHONE ENCOUNTER
Ms. Kaminski left a message asking to receive a copy of her genetic testing results from about 10 years ago (she was tested at [facility]). One of the Program's genetic counseling student's contacted Clay Osman who just received her paper chart. We will mail her the genetic testing results tomorrow.

## 2019-09-30 ENCOUNTER — CLINICAL SUPPORT (OUTPATIENT)
Dept: FAMILY MEDICINE | Facility: CLINIC | Age: 69
End: 2019-09-30
Payer: MEDICARE

## 2019-09-30 DIAGNOSIS — M85.89 OSTEOPENIA OF MULTIPLE SITES: Primary | ICD-10-CM

## 2019-09-30 DIAGNOSIS — Z23 NEED FOR INFLUENZA VACCINATION: Primary | ICD-10-CM

## 2019-09-30 PROCEDURE — G0008 ADMIN INFLUENZA VIRUS VAC: HCPCS | Performed by: FAMILY MEDICINE

## 2019-09-30 PROCEDURE — 90653 IIV ADJUVANT VACCINE IM: CPT | Performed by: FAMILY MEDICINE

## 2019-10-09 ENCOUNTER — TRANSCRIBE ORDERS (OUTPATIENT)
Dept: SCHEDULING | Facility: REHABILITATION | Age: 69
End: 2019-10-09

## 2019-10-09 DIAGNOSIS — M54.50 LOW BACK PAIN: Primary | ICD-10-CM

## 2019-10-24 ENCOUNTER — HOSPITAL ENCOUNTER (OUTPATIENT)
Dept: RADIOLOGY | Age: 69
Discharge: HOME | End: 2019-10-24
Attending: FAMILY MEDICINE
Payer: MEDICARE

## 2019-10-24 DIAGNOSIS — M85.89 OSTEOPENIA OF MULTIPLE SITES: ICD-10-CM

## 2019-10-24 PROCEDURE — 77080 DXA BONE DENSITY AXIAL: CPT

## 2019-10-25 NOTE — RESULT ENCOUNTER NOTE
Luca Bliss,  The dexa scan shows no changes, still osteopenia.  Let me know if you have any questions.  Lucille

## 2019-10-31 ENCOUNTER — HOSPITAL ENCOUNTER (OUTPATIENT)
Dept: OCCUPATIONAL THERAPY | Facility: REHABILITATION | Age: 69
Setting detail: THERAPIES SERIES
Discharge: HOME | End: 2019-10-31
Attending: PAIN MEDICINE
Payer: MEDICARE

## 2019-10-31 DIAGNOSIS — M54.50 LOW BACK PAIN: ICD-10-CM

## 2019-10-31 DIAGNOSIS — G89.29 CHRONIC LOW BACK PAIN, UNSPECIFIED BACK PAIN LATERALITY, UNSPECIFIED WHETHER SCIATICA PRESENT: ICD-10-CM

## 2019-10-31 DIAGNOSIS — M54.50 CHRONIC LOW BACK PAIN, UNSPECIFIED BACK PAIN LATERALITY, UNSPECIFIED WHETHER SCIATICA PRESENT: ICD-10-CM

## 2019-10-31 PROCEDURE — 97163 PT EVAL HIGH COMPLEX 45 MIN: CPT

## 2019-10-31 NOTE — PROGRESS NOTES
Referring Provider: By co-signing this Plan of Care (POC), you agree with the planned services and interventions recommended by the therapist.       NAME: __________________________________ DATE: ___________________        [facility] Fax: [phone]        PT EVALUATION FOR OUTPATIENT THERAPY    Patient: Ruthy Kaminski    MRN: [id number]  : 1950 69 y.o.   Referring Physician: Rafiq Rm DO  Date of Visit: 10/31/2019      Certification Dates:   10/31/19 through 20    Recommended Frequency & Duration:  2 times/week for up to 8 weeks     Diagnosis:   1. Low back pain    2. Chronic low back pain, unspecified back pain laterality, unspecified whether sciatica present        Chief Complaints:   Chief Complaint   Patient presents with   • Back Pain       Precautions: no known precautions/restrictions    Past Medical History:   Past Medical History:   Diagnosis Date   • Baker's cyst of knee, left 2018   • Beta thalassemia (CMS/HCC)    • Breast cancer (CMS/HCC)    • Osteoporosis        Past Surgical History:   Past Surgical History:   Procedure Laterality Date   • EYE SURGERY     • HYSTERECTOMY     • MASTECTOMY           LEARNING ASSESSMENT    Assessment completed: Yes    Learner name:  Ruthy Kaminski    Relationship: Patient    Learning Barriers:  Learning barriers: No Barriers    Preferred Language: English     Needed: No    Learning New Concepts: Listening, Demonstration and Pictures/Video      CO-LEARNER ASSESSMENT:    Completed: No            OBJECTIVE MEASUREMENTS/DATA:    Eval Assessment    Evaluation Assessment and Plan - 10/31/19 1514        Evaluation Assessment and Plan    Plan of Care reviewed and patient/family in agreement  Yes     System Pathology/Pathophysiology Noted  musculoskeletal;neuromuscular     Functional Limitations in Following Categories (PT Eval)  community/leisure     Rehab Potential/Prognosis  good, to achieve stated therapy goals;re-evaluate goals as  necessary     Problem List  decreased ROM;decreased strength;impaired balance;impaired postural control;pain     Clinical Assessment  Pt is a 69 y.o with complaints of low back pain that has increased in the last year. Pt  reports when she is having R hip pain when she gets up form sitting for a period of time. She intermittantly has pain into the R knee as well. Pt has had x-rays of B hips that were normal and MRI of the lumbar spine showing some degenerative changes.  PMH of breast cancer x2 and treated successfully and now has B mastectomy with reconstruction. She also has a history of osteoporosis, thalassemia, and a bakers cyst L knee.  She has now been referred for PT for her low back pain. Her evaluation revieled some mild weakness in her LE's and core with a + [place]'s sign. She demonstrated good trunk ROM with the exception of rotation. Pt may benefit from a core strengthening program with some body mechanics instruction as well. Pt will be scheduled for PT 2x/week for 6 weeks     Planned Services  CPT 15570 Neuromuscular Reeducation;CPT 97899 Therapeutic exercises;CPT 86786 Hot/Cold Packs therapy;CPT 42713 Therapeutic activities;CPT 18169 Manual therapy;CPT 87670 Therapeutic Massage;CPT 66794 Electrical stimulation UNATTENDED         General Info   General Information - 10/31/19 0918        Session Details    Document Type  initial evaluation     Mode of Treatment  individual therapy;physical therapy     Patient/Family/Caregiver Comments/Observations  Pt arrived on time for evaluation     OP Specialty  Orthopedics        Time Calculation    Start Time  0900     Stop Time  1000     Time Calculation (min)  60 min        General Information    Patient Profile Reviewed?  yes     Referring Physician  Rafiq Rm MD     Pertinent History of Current Functional Problem  Pt is a 69 y.o with complaints of low back pain that has increased in the last year. Pt  reports when she is having R hip pain when she gets up  form sitting for a period of time. She intermittantly has pain into the R knee as well. Pt has had x-rays of B hips that were normal and MRI of the lumbar spine showing some degenerative changes.  PMH of breast cancer x2 and treated successfully and now has B mastectomy with reconstruction. She also has a history of osteoporosis, thalassemia, and a bakers cyst L knee.  She has now been referred for PT for her low back pain.      Existing Precautions/Restrictions  no known precautions/restrictions         Pain and Vitals   Pain/Vitals - 10/31/19 1635        Pain Assessment    Currently in pain  Yes     Preferred Pain Scale  number (Numeric Rating Pain Scale)     Pain: Body location  Leg;Hip     Pain Rating (0-10): Pre Activity  0     Pain Rating (0-10): Activity  5    with transitional movement of sitting to stand    Pain Radiation to  leg, right     Frequency  intermittent     Quality  stabbing         Falls Assessment    Falls - 10/31/19 0922        Initial Falls Assessment    One or more falls in the last year  No         Living Environment    Living Environment - 10/31/19 0920        Living Environment    Lives With  spouse     Living Arrangements  house     Living Environment Comment  Pt has stairs but lives first floor.      Transportation Concerns  car, none         PLOF:   Prior Level of Function - 10/31/19 0921        OTHER    Previous level of function  Pt takes some exercises at the gym, typically a yoga or stretching class. She will do some walking as well.           Type and Frequency:   PT - 10/31/19 0919        Physical Therapy    Physical Therapy  Ortho        PT Plan    Frequency of treatment  2 times/week     PT Duration  8 weeks     PT Cert From  10/31/19     PT Cert To  01/29/20     Date PT POC was sent to provider  10/31/19     Signed PT Plan of Care received?   No         Special Tests    Special Tests - 10/31/19 0950        Special Tests    Neuromuscular Tests/Assessment  neurodynamic test         Neurodynamic Testing    Results, Straight Leg Raise Test  left;right;negative result     Results, Slump Test  negative result         Myotomes and Dermatomes   Myotomes/Dermatomes - 10/31/19 0951        Myotome/Dermatome    Myotome/Dermatome Testing  Dermatome Testing (Group)    Pt notes she will gets radiating pain into the L3/4 dermatome       Dermatome Testing    Left C3 Dermatome  intact light touch sensation     Right C3 Dermatome  intact light touch sensation    intemittant complaints of pain    Left C4 Dermatome  intact light touch sensation     Right C4 Dermatome  intact light touch sensation    intermittant reports of pain        ROM   Range of Motion - 10/31/19 1000        RIGHT: Lower Extremity AROM Assessment    Right LE AROM normal  WFL     Hip Extension Deficit  15 degrees     Hip Internal Rotation Deficit  48 degrees     Hip External Rotation Deficit  40 degrees        LEFT: Lower Extremity AROM Assessment    Left LE AROM normal  WFL     Hip Extension Deficit  10 degrees     Hip Internal Rotation Deficit  40 degrees     Hip External Rotation Deficit  38 degrees        Lumbar AROM    Lumbar Flexion  --    WFL but decreased curve reveersal of lumbar spine    Lumbar Extension  --    WFL    Lumbar Left SB  --    WFL    Lumbar Right SB  --    WFL    Lumbar Left Rotation  --    decreased 50%    Lumbar Right Rotation  --    decreased 50%        MMT   Manual Muscle Tests - 10/31/19 1509        RIGHT: Lower Extremity Manual Muscle Test Assessment    Hip Flexion gross movement  (4+/5) good plus     Hip Extension gross movement  (4/5) good     Hip Abduction gross movement  (4+/5) good plus     Hip Adduction gross movement  (4+/5) good plus     Hip Internal Rotation gross movement  (4/5) good     Hip External Rotation gross movement  (4/5) good     Knee Flexion strength  (4/5) good     Knee Extension strength  (4+/5) good plus     Ankle Dorsiflexion gross movement  (4+/5) good plus     Ankle Plantarflexion  gross movement  (4+/5) good plus        LEFT: Lower Extremity Manual Muscle Test Assessment    Hip Flexion gross movement  (4+/5) good plus     Hip Extension gross movement  (4/5) good     Hip Abduction gross movement  (4+/5) good plus     Hip Adduction gross movement  (4+/5) good plus     Hip Internal Rotation gross movement  (4+/5) good plus     Hip External Rotation gross movement  (4+/5) good plus     Knee Flexion strength  (5/5) normal     Knee Extension strength  (5/5) normal     Ankle Dorsiflexion gross movement  (5/5) normal     Ankle Plantarflexion gross movement  (5/5) normal         Palpation   Palpation - 10/31/19 0953        Palpation    Back Palpation   tenderness along the L paraspinals in the lumbar spine, level PSIS and non tender, ASIS level          Gait and Mobility   Gait and Mobility - 10/31/19 1510        Gait Training    Patrick, Gait  independent     Assistive Device  none     Comment  genu valgus B knees, narrow DURAN        Stairs Assessment    Number of Stairs  11     Stair Height  7 inches     Ascending Stairs Technique  step-over-step     Descending Stairs Technique  step-over-step     Comment  Pt gauded with R leg, difficulty pushing off R leg and difficulty controling descent with R leg             Goals        Patient Stated    • Other Goal (pt-stated)      Be be able to lift something using good body mechanics  Decrease the R leg/hip pain         Other    • Other Goal      Short and Long Term Goals Time Frame Result Comment/Progress   Decrease pain level to 2/10 4 weeks       Decrease pain to 1/10 at worst 6 weeks     Be able to transition from sit to stand without pain 4 weeks     TUNDE 20 % 4 weeks  TUNDE at IE 30%   TUNDE 10% or less 6 weeks     Increase strength LE to 5/5/ 6 weeks     I HEP 6 weeks     Improve trunk rotation by 25% 6 weeks                                         TREATMENT PLAN:            ASSESSMENT:    This 69 y.o. year old female presents to PT with above stated  diagnosis. Physical Therapy evaluation reveals decreased ROM, decreased strength, impaired balance, impaired postural control, pain resulting in community/leisure limitations. Ruthy Kaminski will benefit from skilled PT services to address limitation, work towards rehab and patient goals and maximize PLOF of chosen ADLs.     Planned Services: The patient's treatment will include CPT 10673 Neuromuscular Reeducation, CPT 61304 Therapeutic exercises, CPT 19893 Hot/Cold Packs therapy, CPT 30019 Therapeutic activities, CPT 84444 Manual therapy, CPT 50489 Therapeutic Massage, CPT 02902 Electrical stimulation UNATTENDED, .

## 2019-11-01 NOTE — OP PT TREATMENT LOG
Exercises Current Session Time   THER ACT  TOTAL TIME FOR SESSION Not performed           THER EX TOTAL TIME FOR SESSION Not performed   Single knee to chest    Supine trunk rotation    Piriformis stretch    Figure 4 stretch    Hamstring stretch    Hip flexor stretch        Pelvic tilts    Alternating leg raises    Straight leg raises    Ball squeezes     S/L hip abduction    clams    90-90's    bridging    squats    Bird dog    Juma Crunch    Cardio        NEURO RE-ED TOTAL TIME FOR SESSION Not performed               GAIT TRAINING TOTAL TIME FOR SESSION Not performed           MANUAL TOTAL TIME FOR SESSION Not performed   Anterior R hip capsule stretch PRN, Lumbar parspinal STM on L    MODALITIES TOTAL TIME FOR SESSION Not performed   MHP to R hip and low back

## 2019-11-05 ENCOUNTER — HOSPITAL ENCOUNTER (OUTPATIENT)
Dept: OCCUPATIONAL THERAPY | Facility: REHABILITATION | Age: 69
Setting detail: THERAPIES SERIES
Discharge: HOME | End: 2019-11-05
Attending: PAIN MEDICINE
Payer: MEDICARE

## 2019-11-05 DIAGNOSIS — M54.50 CHRONIC LOW BACK PAIN, UNSPECIFIED BACK PAIN LATERALITY, UNSPECIFIED WHETHER SCIATICA PRESENT: Primary | ICD-10-CM

## 2019-11-05 DIAGNOSIS — G89.29 CHRONIC LOW BACK PAIN, UNSPECIFIED BACK PAIN LATERALITY, UNSPECIFIED WHETHER SCIATICA PRESENT: Primary | ICD-10-CM

## 2019-11-05 PROCEDURE — 97010 HOT OR COLD PACKS THERAPY: CPT

## 2019-11-05 PROCEDURE — 97140 MANUAL THERAPY 1/> REGIONS: CPT | Mod: GP

## 2019-11-05 PROCEDURE — 97110 THERAPEUTIC EXERCISES: CPT | Mod: GP

## 2019-11-05 NOTE — PROGRESS NOTES
PT DAILY NOTE FOR OUTPATIENT THERAPY    Patient: Ruthy Kaminski   MRN: [id number]  : 1950 69 y.o.  Referring Physician: Rafiq Rm DO  Date of Visit: 2019      Certification Dates: 10/31/19 through 20    Diagnosis:   1. Chronic low back pain, unspecified back pain laterality, unspecified whether sciatica present        Chief Complaints:   Chief Complaint   Patient presents with   • Back Pain   • Hip Pain   • Knee Pain       Precautions: no known precautions/restrictions      TODAY'S VISIT    General Information - 19 1124        Session Details    Document Type  daily treatment     Mode of Treatment  individual therapy;physical therapy     Patient/Family/Caregiver Comments/Observations  Pt reports she went without her pain medicine for 4 days to show therapy how much pain she really has.     OP Specialty  Orthopedics        Time Calculation    Start Time  1100     Stop Time  1200     Time Calculation (min)  60 min        General Information    Existing Precautions/Restrictions  no known precautions/restrictions         Pain/Vitals - 19 1114        Pain Assessment    Currently in pain  Yes     Preferred Pain Scale  number (Numeric Rating Pain Scale)     Pain: Body location  Back;Hip     Pain Rating (0-10): Pre Activity  4     Pain Rating (0-10): Activity  8    walking or general activity    Pain Onset/Duration  Held off on the meloxocam for a few days to see how she felt. She had increased pain as a result         Daily Falls Screen - 19 1125        Daily Falls Assessment    Patient reported fall since last visit  No         Daily Treatment Assessment and Plan - 19 1204        Daily Treatment Assessment and Plan    Progress toward goals  Progressing     Daily Outcome Summary  Pt responded well to the manual work improving her mobility. Pts pain was significantly less at the end of therapy.  She was ambulating with a flexed posture when she arrived but now more  upright with increased stride length., Pt was given anterior capsule stretch and trunk rotation to do at home as well as pelvic tilts. She will also try a lumbar roll behind her when sitting.     Plan and Recommendations  continue with core stabilization as well as manual work to improve lumbar rotation and extension as well as capsular mobility in the hip.           OBJECTIVE DATA TAKEN TODAY:    None taken    Today's Treatment:      Exercises Current Session Time   THER ACT  TOTAL TIME FOR SESSION Not performed   HEP issued Pelvic tilts, trunk rotations, ant hip capsule stretch       THER EX TOTAL TIME FOR SESSION Not performed   Single knee to chest    Supine trunk rotation 4x10 to the L only  R rotation caused R sided low back pain but after the manual work she was able to perform R rotation without pain   Piriformis stretch 4x10s no problems post the manual work   Figure 4 stretch    Hamstring stretch 3x30s   Hip flexor stretch        Pelvic tilts 10   Alternating leg raises 20   Straight leg raises    Ball squeezes     S/L hip abduction 10   clams 10   90-90's 10   bridging 10   squats    Bird dog    Juma Crunch    Cardio        NEURO RE-ED TOTAL TIME FOR SESSION Not performed               GAIT TRAINING TOTAL TIME FOR SESSION Not performed           MANUAL TOTAL TIME FOR SESSION 8-22 Minutes   Anterior R hip capsule stretch PRN, Lumbar UPA's B lower lumbar spine grade 2+3    MODALITIES TOTAL TIME FOR SESSION Not performed   MHP to R hip and low back

## 2019-11-07 ENCOUNTER — HOSPITAL ENCOUNTER (OUTPATIENT)
Dept: OCCUPATIONAL THERAPY | Facility: REHABILITATION | Age: 69
Setting detail: THERAPIES SERIES
Discharge: HOME | End: 2019-11-07
Attending: PAIN MEDICINE
Payer: MEDICARE

## 2019-11-07 DIAGNOSIS — G89.29 CHRONIC LOW BACK PAIN, UNSPECIFIED BACK PAIN LATERALITY, UNSPECIFIED WHETHER SCIATICA PRESENT: Primary | ICD-10-CM

## 2019-11-07 DIAGNOSIS — M54.50 CHRONIC LOW BACK PAIN, UNSPECIFIED BACK PAIN LATERALITY, UNSPECIFIED WHETHER SCIATICA PRESENT: Primary | ICD-10-CM

## 2019-11-07 PROCEDURE — 97010 HOT OR COLD PACKS THERAPY: CPT

## 2019-11-07 PROCEDURE — 97140 MANUAL THERAPY 1/> REGIONS: CPT | Mod: GP

## 2019-11-07 PROCEDURE — 97110 THERAPEUTIC EXERCISES: CPT | Mod: GP

## 2019-11-07 NOTE — OP PT TREATMENT LOG
Exercises Current Session Time   THER ACT  TOTAL TIME FOR SESSION Not performed   HEP issued Pelvic tilts, trunk rotations, ant hip capsule stretch       THER EX TOTAL TIME FOR SESSION 23-37 Minutes   Single knee to chest 4x10s   Supine trunk rotation 4x10     Piriformis stretch 4x10s    Figure 4 stretch    Hamstring stretch 2x30s   Hip flexor stretch        Pelvic tilts 10   Alternating leg raises 20   Straight leg raises    Ball squeezes     S/L hip abduction 15   clams 15   90-90's 15   bridging 15   squats add   Bird dog add   Juma Crunch    Cardio add       NEURO RE-ED TOTAL TIME FOR SESSION Not performed               GAIT TRAINING TOTAL TIME FOR SESSION Not performed           MANUAL TOTAL TIME FOR SESSION 8-22 Minutes   Anterior R hip capsule stretch PRN, Lumbar UPA's B lower lumbar spine grade 2+3    MODALITIES TOTAL TIME FOR SESSION 8-22 Minutes   MHP to R hip and low back

## 2019-11-07 NOTE — PROGRESS NOTES
PT DAILY NOTE FOR OUTPATIENT THERAPY    Patient: Ruthy Kaminski   MRN: [id number]  : 1950 69 y.o.  Referring Physician: Rafiq Rm DO  Date of Visit: 2019      Certification Dates: 10/31/19 through 20    Diagnosis:   1. Chronic low back pain, unspecified back pain laterality, unspecified whether sciatica present        Chief Complaints:   Chief Complaint   Patient presents with   • Back Pain       Precautions: no known precautions/restrictions      TODAY'S VISIT    General Information - 19 1128        Session Details    Document Type  daily treatment     Mode of Treatment  individual therapy;physical therapy     Patient/Family/Caregiver Comments/Observations  Pt feeling much better today. She is not feeling the same intensity of pain. She took a pain pill yesterday but not today.      OP Specialty  Orthopedics        Time Calculation    Start Time  1100     Stop Time  1200     Time Calculation (min)  60 min        General Information    Existing Precautions/Restrictions  no known precautions/restrictions         Pain/Vitals - 19 1126        Pain Assessment    Currently in pain  Yes     Preferred Pain Scale  number (Numeric Rating Pain Scale)     Pain: Body location  Back;Hip     Pain Rating (0-10): Pre Activity  0     Pain Rating (0-10): Activity  3     Pain Radiation to  leg, right     Frequency  intermittent     Pain Onset/Duration  Pt reports she has been feeling much better since last visit         Daily Falls Screen - 19 1130        Daily Falls Assessment    Patient reported fall since last visit  No         Daily Treatment Assessment and Plan - 19 1131        Daily Treatment Assessment and Plan    Progress toward goals  Progressing     Daily Outcome Summary  Pt reporting less pain and was able to perform the exercises today without complaints of anterior hip pain or low back pain increase. Pt was able to perform pelvic tilt much easier today.  Much less  spasms noted in the paraspinals. There were some trigger points B glut med.  Pt felt great post therapy today      Plan and Recommendations  Continue to progress strengthening adding more dynamic activities.           OBJECTIVE DATA TAKEN TODAY:    None taken    Today's Treatment:      Exercises Current Session Time   THER ACT  TOTAL TIME FOR SESSION Not performed   HEP issued Pelvic tilts, trunk rotations, ant hip capsule stretch       THER EX TOTAL TIME FOR SESSION 23-37 Minutes   Single knee to chest 4x10s   Supine trunk rotation 4x10     Piriformis stretch 4x10s    Figure 4 stretch    Hamstring stretch 2x30s   Hip flexor stretch        Pelvic tilts 10   Alternating leg raises 20   Straight leg raises    Ball squeezes     S/L hip abduction 15   clams 15   90-90's 15   bridging 15   squats add   Bird dog add   Juma Crunch    Cardio add       NEURO RE-ED TOTAL TIME FOR SESSION Not performed               GAIT TRAINING TOTAL TIME FOR SESSION Not performed           MANUAL TOTAL TIME FOR SESSION 8-22 Minutes   Anterior R hip capsule stretch PRN, Lumbar UPA's B lower lumbar spine grade 2+3    MODALITIES TOTAL TIME FOR SESSION 8-22 Minutes   MHP to R hip and low back

## 2019-11-12 ENCOUNTER — HOSPITAL ENCOUNTER (OUTPATIENT)
Dept: OCCUPATIONAL THERAPY | Facility: REHABILITATION | Age: 69
Setting detail: THERAPIES SERIES
Discharge: HOME | End: 2019-11-12
Attending: PAIN MEDICINE
Payer: MEDICARE

## 2019-11-12 DIAGNOSIS — M54.50 CHRONIC LOW BACK PAIN, UNSPECIFIED BACK PAIN LATERALITY, UNSPECIFIED WHETHER SCIATICA PRESENT: Primary | ICD-10-CM

## 2019-11-12 DIAGNOSIS — G89.29 CHRONIC LOW BACK PAIN, UNSPECIFIED BACK PAIN LATERALITY, UNSPECIFIED WHETHER SCIATICA PRESENT: Primary | ICD-10-CM

## 2019-11-12 PROCEDURE — 97110 THERAPEUTIC EXERCISES: CPT | Mod: GP

## 2019-11-12 PROCEDURE — 97010 HOT OR COLD PACKS THERAPY: CPT

## 2019-11-12 PROCEDURE — 97124 MASSAGE THERAPY: CPT | Mod: GP

## 2019-11-12 NOTE — PROGRESS NOTES
PT DAILY NOTE FOR OUTPATIENT THERAPY    Patient: Ruthy Kaminski   MRN: [id number]  : 1950 69 y.o.  Referring Physician: Rafiq Rm DO  Date of Visit: 2019      Certification Dates: 10/31/19 through 20    Diagnosis: No diagnosis found.    Chief Complaints:   Chief Complaint   Patient presents with   • Pain       Precautions: no known precautions/restrictions      TODAY'S VISIT    General Information - 19 0925        Session Details    Document Type  daily treatment     Mode of Treatment  individual therapy;physical therapy     Patient/Family/Caregiver Comments/Observations  Pt notes back pain 3/10.        Time Calculation    Start Time  0900     Stop Time  1000     Time Calculation (min)  60 min        General Information    Patient Profile Reviewed?  yes     Existing Precautions/Restrictions  no known precautions/restrictions           Daily Falls Screen - 19 0936        Daily Falls Assessment    Patient reported fall since last visit  No         Daily Treatment Assessment and Plan - 19 1017        Daily Treatment Assessment and Plan    Progress toward goals  Progressing     Daily Outcome Summary  Tolerated all ex well. Pt able to complete all ex wtihout increase pain. Pt has tenderness R low back paraspinals. Pt c/o dizziness and BP/HR wer monitored.  Instructed pt to sit  for  few minutes when going from supine to sit.      Plan and Recommendations  Cont with therapy to improve LE/core strength.           OBJECTIVE DATA TAKEN TODAY:        Today's Treatment:      Exercises Current Session Time   THER ACT  TOTAL TIME FOR SESSION Not performed   HEP issued Pelvic tilts, trunk rotations, ant hip capsule stretch       THER EX TOTAL TIME FOR SESSION 23-37 Minutes   Single knee to chest 4x10s   Supine trunk rotation 4x10     Piriformis stretch 4x10s    Figure 4 stretch    Hamstring stretch 2x30s   Hip flexor stretch        Pelvic tilts 10   Alternating leg raises 20    Straight leg raises    Ball squeezes     S/L hip abduction 15   clams 15   90-90's 15   bridging 15   squats add   Bird dog add   Juma Crunch    Cardio add       NEURO RE-ED TOTAL TIME FOR SESSION Not performed               GAIT TRAINING TOTAL TIME FOR SESSION Not performed           MANUAL TOTAL TIME FOR SESSION 8-22 Minutes   Anterior R hip capsule stretch PRN, Lumbar UPA's B lower lumbar spine grade 2+3    MODALITIES TOTAL TIME FOR SESSION 8-22 Minutes   MHP to R hip and low back

## 2019-11-13 ENCOUNTER — HOSPITAL ENCOUNTER (OUTPATIENT)
Dept: OCCUPATIONAL THERAPY | Facility: REHABILITATION | Age: 69
Setting detail: THERAPIES SERIES
Discharge: HOME | End: 2019-11-13
Attending: PAIN MEDICINE
Payer: MEDICARE

## 2019-11-13 DIAGNOSIS — G89.29 CHRONIC LOW BACK PAIN, UNSPECIFIED BACK PAIN LATERALITY, UNSPECIFIED WHETHER SCIATICA PRESENT: Primary | ICD-10-CM

## 2019-11-13 DIAGNOSIS — M54.50 CHRONIC LOW BACK PAIN, UNSPECIFIED BACK PAIN LATERALITY, UNSPECIFIED WHETHER SCIATICA PRESENT: Primary | ICD-10-CM

## 2019-11-13 PROCEDURE — 97124 MASSAGE THERAPY: CPT | Mod: GP

## 2019-11-13 PROCEDURE — 97110 THERAPEUTIC EXERCISES: CPT | Mod: GP

## 2019-11-13 PROCEDURE — 97010 HOT OR COLD PACKS THERAPY: CPT

## 2019-11-13 NOTE — OP PT TREATMENT LOG
Exercises Current Session Time   THER ACT  TOTAL TIME FOR SESSION Not performed   HEP issued Pelvic tilts, trunk rotations, ant hip capsule stretch       THER EX TOTAL TIME FOR SESSION 23-37 Minutes   Single knee to chest 4x10s   Supine trunk rotation 4x10     Piriformis stretch 4x10s    Figure 4 stretch    Hamstring stretch 2x30s   Hip flexor stretch 30s x 3   Foam roller vertical 3 min   Pelvic tilts 10   Alternating leg raises 20   Straight leg raises    Ball squeezes     S/L hip abduction 15   clams 15   90-90's 15   bridging 15   squats add   Bird dog add   Juma Crunch    Cardio add       NEURO RE-ED TOTAL TIME FOR SESSION Not performed               GAIT TRAINING TOTAL TIME FOR SESSION Not performed           MANUAL TOTAL TIME FOR SESSION 8-22 Minutes   Anterior R hip capsule stretch PRN, Lumbar UPA's B lower lumbar spine grade 2+3 Soft tissue massage to R low back paraspinals    MODALITIES TOTAL TIME FOR SESSION 8-22 Minutes   MHP to R hip and low back

## 2019-11-13 NOTE — PROGRESS NOTES
PT DAILY NOTE FOR OUTPATIENT THERAPY    Patient: Ruthy Kaminski   MRN: [id number]  : 1950 69 y.o.  Referring Physician: Rafiq Rm DO  Date of Visit: 2019      Certification Dates: 10/31/19 through 20    Diagnosis:   1. Chronic low back pain, unspecified back pain laterality, unspecified whether sciatica present        Chief Complaints:   Chief Complaint   Patient presents with   • Pain       Precautions: no known precautions/restrictions      TODAY'S VISIT    General Information - 19 0930        Session Details    Document Type  daily treatment     Mode of Treatment  individual therapy;physical therapy     Patient/Family/Caregiver Comments/Observations  Pt notes she was sitting on sofa and had increase low back pain.      OP Specialty  Orthopedics        Time Calculation    Start Time  0900     Stop Time  1000     Time Calculation (min)  60 min        General Information    Patient Profile Reviewed?  yes     Existing Precautions/Restrictions  no known precautions/restrictions         Pain/Vitals - 19 0929        Pain Assessment    Currently in pain  Yes     Preferred Pain Scale  number (Numeric Rating Pain Scale)     Pain: Body location  Back     Pain Rating (0-10): Pre Activity  4     Pain Rating (0-10): Activity  4     Pain Rating (0-10): Post Activity  3        Pre Activity Vital Signs    Pulse  67     SpO2  97 %     BP  131/64     BP Location  Left upper arm     BP Method  Automatic     Patient Position  Lying         Daily Falls Screen - 19 0932        Daily Falls Assessment    Patient reported fall since last visit  No         Daily Treatment Assessment and Plan - 19 1030        Daily Treatment Assessment and Plan    Progress toward goals  Progressing     Daily Outcome Summary  Tolerated all ex well. Pt learning ex for proper form. Pt tried foam roller for stretching low/mid back. Pt has no dizziness today. Pt staying hydrated throughout the day. Monitored  BP/HR.       Plan and Recommendations  Cont with therapy to improve LE/core strength.           OBJECTIVE DATA TAKEN TODAY:        Today's Treatment:      Exercises Current Session Time   THER ACT  TOTAL TIME FOR SESSION Not performed   HEP issued Pelvic tilts, trunk rotations, ant hip capsule stretch       THER EX TOTAL TIME FOR SESSION 23-37 Minutes   Single knee to chest 4x10s   Supine trunk rotation 4x10     Piriformis stretch 4x10s    Figure 4 stretch    Hamstring stretch 2x30s   Hip flexor stretch 30s x 3   Foam roller vertical 3 min   Pelvic tilts 10   Alternating leg raises 20   Straight leg raises    Ball squeezes     S/L hip abduction 15   clams 15   90-90's 15   bridging 15   squats add   Bird dog add   Juma Crunch    Cardio add       NEURO RE-ED TOTAL TIME FOR SESSION Not performed               GAIT TRAINING TOTAL TIME FOR SESSION Not performed           MANUAL TOTAL TIME FOR SESSION 8-22 Minutes   Anterior R hip capsule stretch PRN, Lumbar UPA's B lower lumbar spine grade 2+3 Soft tissue massage to R low back paraspinals    MODALITIES TOTAL TIME FOR SESSION 8-22 Minutes   MHP to R hip and low back

## 2019-11-19 ENCOUNTER — HOSPITAL ENCOUNTER (OUTPATIENT)
Dept: OCCUPATIONAL THERAPY | Facility: REHABILITATION | Age: 69
Setting detail: THERAPIES SERIES
Discharge: HOME | End: 2019-11-19
Attending: PAIN MEDICINE
Payer: MEDICARE

## 2019-11-19 DIAGNOSIS — M54.50 CHRONIC LOW BACK PAIN, UNSPECIFIED BACK PAIN LATERALITY, UNSPECIFIED WHETHER SCIATICA PRESENT: Primary | ICD-10-CM

## 2019-11-19 DIAGNOSIS — G89.29 CHRONIC LOW BACK PAIN, UNSPECIFIED BACK PAIN LATERALITY, UNSPECIFIED WHETHER SCIATICA PRESENT: Primary | ICD-10-CM

## 2019-11-19 PROCEDURE — 97110 THERAPEUTIC EXERCISES: CPT | Mod: GP

## 2019-11-19 PROCEDURE — 97010 HOT OR COLD PACKS THERAPY: CPT

## 2019-11-19 PROCEDURE — 97124 MASSAGE THERAPY: CPT | Mod: GP

## 2019-11-19 NOTE — OP PT TREATMENT LOG
Exercises Current Session Time   THER ACT  TOTAL TIME FOR SESSION Not performed   HEP issued Pelvic tilts, trunk rotations, ant hip capsule stretch       THER EX TOTAL TIME FOR SESSION 23-37 Minutes   Single knee to chest 4x10s   Supine trunk rotation 4x10     Piriformis stretch 4x10s    Figure 4 stretch    Hamstring stretch 2x30s   Hip flexor stretch 30s x 3   Foam roller vertical 3 min   Pelvic tilts 10   Alternating leg raises 20   Straight leg raises    Ball squeezes     S/L hip abduction 20   clams 20   90-90's 20   bridging 15   squats 10   Bird dog 10  Try over ball next time   Juma Crunch    Cardio add       NEURO RE-ED TOTAL TIME FOR SESSION Not performed               GAIT TRAINING TOTAL TIME FOR SESSION Not performed           MANUAL TOTAL TIME FOR SESSION 8-22 Minutes   Anterior R hip capsule stretch PRN, Lumbar UPA's B lower lumbar spine grade 2+3 Soft tissue massage to R low back paraspinals    MODALITIES TOTAL TIME FOR SESSION 8-22 Minutes   MHP to R hip and low back

## 2019-11-19 NOTE — PROGRESS NOTES
PT DAILY NOTE FOR OUTPATIENT THERAPY    Patient: Ruthy Kaminski   MRN: [id number]  : 1950 69 y.o.  Referring Physician: Rafiq Rm DO  Date of Visit: 2019      Certification Dates: 10/31/19 through 20    Diagnosis:   1. Chronic low back pain, unspecified back pain laterality, unspecified whether sciatica present        Chief Complaints:   Chief Complaint   Patient presents with   • Pain       Precautions: no known precautions/restrictions      TODAY'S VISIT    General Information - 19 0936        Session Details    Document Type  daily treatment     Mode of Treatment  individual therapy;physical therapy     Patient/Family/Caregiver Comments/Observations  Pt notes she drove 6 hours over the weekend and her back felt good.     OP Specialty  Orthopedics        Time Calculation    Start Time  0900     Stop Time  1000     Time Calculation (min)  60 min        General Information    Patient Profile Reviewed?  yes     Existing Precautions/Restrictions  no known precautions/restrictions         Pain/Vitals - 19 0935        Pain Assessment    Currently in pain  No/Denies     Preferred Pain Scale  number (Numeric Rating Pain Scale)     Pain: Body location  Back     Pain Rating (0-10): Pre Activity  0     Pain Rating (0-10): Activity  0     Pain Rating (0-10): Post Activity  0        Pre Activity Vital Signs    Pulse  69     SpO2  98 %     BP  128/63     BP Location  Right upper arm     BP Method  Automatic     Patient Position  Lying         Daily Falls Screen - 19 0943        Daily Falls Assessment    Patient reported fall since last visit  No         Daily Treatment Assessment and Plan - 19 1043        Daily Treatment Assessment and Plan    Progress toward goals  Progressing     Daily Outcome Summary  Tolerated all ex well. Pt feels good with ex program. Pt was able to progress TE's for core strengthening. Pt had a 6 hour car ride over the weekend and used a towel roll in  her low back area  while traveling in the car . Pt felt the benefit of his  towel rol to help reduce pain.     Plan and Recommendations  Cont with therapy to improve LE/core strength.           OBJECTIVE DATA TAKEN TODAY:      Today's Treatment:      Exercises Current Session Time   THER ACT  TOTAL TIME FOR SESSION Not performed   HEP issued Pelvic tilts, trunk rotations, ant hip capsule stretch       THER EX TOTAL TIME FOR SESSION 23-37 Minutes   Single knee to chest 4x10s   Supine trunk rotation 4x10     Piriformis stretch 4x10s    Figure 4 stretch    Hamstring stretch 2x30s   Hip flexor stretch 30s x 3   Foam roller vertical 3 min   Pelvic tilts 10   Alternating leg raises 20   Straight leg raises    Ball squeezes     S/L hip abduction 20   clams 20   90-90's 20   bridging 15   squats 10   Bird dog 10  Try over ball next time   Juma Crunch    Cardio add       NEURO RE-ED TOTAL TIME FOR SESSION Not performed               GAIT TRAINING TOTAL TIME FOR SESSION Not performed           MANUAL TOTAL TIME FOR SESSION 8-22 Minutes   Anterior R hip capsule stretch PRN, Lumbar UPA's B lower lumbar spine grade 2+3 Soft tissue massage to R low back paraspinals    MODALITIES TOTAL TIME FOR SESSION 8-22 Minutes   MHP to R hip and low back

## 2019-11-20 NOTE — OP PT TREATMENT LOG
Chronic and ongoing problem.  Likely multifactorial including pulmonary hypertension, valvular disease, arrhythmia, congestive heart failure, asthma, and obstructive lung disease.  Continue home oxygen therapy 2 L continuously and 5 L with exertion.  Continue follow-up with pulmonary medicine, next visit in March 2020.   Exercises Current Session Time   THER ACT  TOTAL TIME FOR SESSION Not performed   HEP issued Pelvic tilts, trunk rotations, ant hip capsule stretch       THER EX TOTAL TIME FOR SESSION Not performed   Single knee to chest    Supine trunk rotation 4x10 to the L only  R rotation caused R sided low back pain but after the manual work she was able to perform R rotation without pain   Piriformis stretch 4x10s no problems post the manual work   Figure 4 stretch    Hamstring stretch 3x30s   Hip flexor stretch        Pelvic tilts 10   Alternating leg raises 20   Straight leg raises    Ball squeezes     S/L hip abduction 10   clams 10   90-90's 10   bridging 10   squats    Bird dog    Juma Crunch    Cardio        NEURO RE-ED TOTAL TIME FOR SESSION Not performed               GAIT TRAINING TOTAL TIME FOR SESSION Not performed           MANUAL TOTAL TIME FOR SESSION 8-22 Minutes   Anterior R hip capsule stretch PRN, Lumbar UPA's B lower lumbar spine grade 2+3    MODALITIES TOTAL TIME FOR SESSION Not performed   MHP to R hip and low back

## 2019-11-21 ENCOUNTER — HOSPITAL ENCOUNTER (OUTPATIENT)
Dept: OCCUPATIONAL THERAPY | Facility: REHABILITATION | Age: 69
Setting detail: THERAPIES SERIES
Discharge: HOME | End: 2019-11-21
Attending: PAIN MEDICINE
Payer: MEDICARE

## 2019-11-21 DIAGNOSIS — G89.29 CHRONIC LOW BACK PAIN, UNSPECIFIED BACK PAIN LATERALITY, UNSPECIFIED WHETHER SCIATICA PRESENT: Primary | ICD-10-CM

## 2019-11-21 DIAGNOSIS — M54.50 CHRONIC LOW BACK PAIN, UNSPECIFIED BACK PAIN LATERALITY, UNSPECIFIED WHETHER SCIATICA PRESENT: Primary | ICD-10-CM

## 2019-11-21 PROCEDURE — 97010 HOT OR COLD PACKS THERAPY: CPT

## 2019-11-21 PROCEDURE — 97140 MANUAL THERAPY 1/> REGIONS: CPT | Mod: GP

## 2019-11-21 PROCEDURE — 97110 THERAPEUTIC EXERCISES: CPT | Mod: GP

## 2019-11-21 NOTE — OP PT TREATMENT LOG
Exercises Current Session Time   THER ACT  TOTAL TIME FOR SESSION Not performed   HEP issued    Reassessed fro DC Reviewed HEP, TUNDE  2%   THER EX TOTAL TIME FOR SESSION 23-37 Minutes   Single knee to chest 4x10s   Supine trunk rotation 4x10     Piriformis stretch 4x10s    Figure 4 stretch    Hamstring stretch 2x30s   Hip flexor stretch 30s x 3   Foam roller vertical 3 min   Pelvic tilts 10   Alternating leg raises 20   Straight leg raises    Ball squeezes     S/L hip abduction 20   clams 20   90-90's 20   bridging 15   squats 10   Bird dog 10  Try over ball next time   Juma Crunch    Cardio add       NEURO RE-ED TOTAL TIME FOR SESSION Not performed               GAIT TRAINING TOTAL TIME FOR SESSION Not performed           MANUAL TOTAL TIME FOR SESSION 8-22 Minutes   ,trigger point R Glut medius    MODALITIES TOTAL TIME FOR SESSION 8-22 Minutes   MHP to R hip and low back

## 2019-11-21 NOTE — PROGRESS NOTES
PT DISCHARGE NOTE FOR OUTPATIENT THERAPY    Patient: Ruthy Kaminski   MRN: [id number]  : 1950 69 y.o.  Referring Physician: Rafiq Rm DO  Date of Visit: 2019      Certification Dates: 10/31/19 through 20    Total Visit Count: 7    Chief Complaints:   Chief Complaint   Patient presents with   • Back Pain       Precautions: no known precautions/restrictions    TODAY'S VISIT:    General Information - 19        Session Details    Document Type  discharge evaluation     Mode of Treatment  individual therapy;physical therapy     Patient/Family/Caregiver Comments/Observations  Pt reports she is doing  well and is OK to be discharged today     OP Specialty  Orthopedics        Time Calculation    Start Time  0900     Stop Time  1000     Time Calculation (min)  60 min        General Information    Existing Precautions/Restrictions  no known precautions/restrictions         Pain/Vitals - 19        Pain Assessment    Currently in pain  Yes     Preferred Pain Scale  number (Numeric Rating Pain Scale)     Pain: Body location  Back     Pain Rating (0-10): Pre Activity  1     Pain Rating (0-10): Activity  1     Pain Onset/Duration  minimal pain complaints into the R hip. She can take a meloxacam and can be good for 3 days         Daily Falls Screen - 19        Daily Falls Assessment    Patient reported fall since last visit  No         Daily Treatment Assessment and Plan - 19        Daily Treatment Assessment and Plan    Progress toward goals  Progressing     Daily Outcome Summary  Pt made nice manrique and is more active at home. Trunk ROM and strength improved. Pain significantly better. SHe is I with HEp at this time     Plan and Recommendations  discharge from PT to HEP            OBJECTIVE MEASUREMENTS/DATA:    ROM   Range of Motion - 19 1000        RIGHT: Lower Extremity AROM Assessment    Right LE AROM normal  WNL        LEFT: Lower Extremity AROM  Assessment    Left LE AROM normal  WNL        Lumbar AROM    Lumbar Flexion  --    WNL with decreased curve reversal lumbar spine    Lumbar Extension  --    WNL    Lumbar Left SB  --    WNL    Lumbar Right SB  --    WNL    Lumbar Left Rotation  --    WNL    Lumbar Right Rotation  --    WNL        MMT   Manual Muscle Tests - 11/21/19 1009        RIGHT: Lower Extremity Manual Muscle Test Assessment    Hip Flexion gross movement  (5/5) normal     Hip Extension gross movement  (5/5) normal     Hip Abduction gross movement  (5/5) normal     Hip Adduction gross movement  (5/5) normal     Hip Internal Rotation gross movement  (5/5) normal     Hip External Rotation gross movement  (5/5) normal     Knee Flexion strength  (5/5) normal     Knee Extension strength  (5/5) normal     Ankle Dorsiflexion gross movement  (5/5) normal     Ankle Plantarflexion gross movement  (5/5) normal        LEFT: Lower Extremity Manual Muscle Test Assessment    Hip Flexion gross movement  (5/5) normal     Hip Extension gross movement  (5/5) normal     Hip Abduction gross movement  (5/5) normal     Hip Adduction gross movement  (5/5) normal     Hip Internal Rotation gross movement  (5/5) normal     Hip External Rotation gross movement  (5/5) normal     Knee Flexion strength  (5/5) normal     Knee Extension strength  (5/5) normal     Ankle Dorsiflexion gross movement  (5/5) normal     Ankle Plantarflexion gross movement  (5/5) normal         Palpation     Gait and Mobility   Gait and Mobility - 11/21/19 1010        Gait Training    Andrew, Gait  independent     Comment  normal heel toe gait with mild genu valgus           Today's Treatment:      Exercises Current Session Time   THER ACT  TOTAL TIME FOR SESSION Not performed   HEP issued    Reassessed fro DC Reviewed HEP, TUNDE  2%   THER EX TOTAL TIME FOR SESSION 23-37 Minutes   Single knee to chest 4x10s   Supine trunk rotation 4x10     Piriformis stretch 4x10s    Figure 4 stretch    Hamstring  stretch 2x30s   Hip flexor stretch 30s x 3   Foam roller vertical 3 min   Pelvic tilts 10   Alternating leg raises 20   Straight leg raises    Ball squeezes     S/L hip abduction 20   clams 20   90-90's 20   bridging 15   squats 10   Bird dog 10  Try over ball next time   Juma Crunch    Cardio add       NEURO RE-ED TOTAL TIME FOR SESSION Not performed               GAIT TRAINING TOTAL TIME FOR SESSION Not performed           MANUAL TOTAL TIME FOR SESSION 8-22 Minutes   ,trigger point R Glut medius    MODALITIES TOTAL TIME FOR SESSION 8-22 Minutes   MHP to R hip and low back            Goals Addressed                 This Visit's Progress       Patient Stated    • COMPLETED: Other Goal (pt-stated)   On track     Be be able to lift something using good body mechanics  Decrease the R leg/hip pain         Other    • COMPLETED: Other Goal        Short and Long Term Goals Time Frame Result Comment/Progress   Decrease pain level to 2/10 4 weeks met      Decrease pain to 1/10 at worst 6 weeks met    Be able to transition from sit to stand without pain 4 weeks met    TUNDE 20 % 4 weeks met TUNDE at IE 30% DC TUNDE 2%   TNUDE 10% or less 6 weeks met    Increase strength LE to 5/5/ 6 weeks met    I HEP 6 weeks met    Improve trunk rotation by 25% 6 weeks met

## 2019-11-21 NOTE — ADDENDUM NOTE
Encounter addended by: See, Agustina LOUIE PT on: 11/21/2019 4:06 PM   Actions taken: Episode resolved

## 2019-12-12 RX ORDER — AZITHROMYCIN 250 MG/1
TABLET, FILM COATED ORAL
Qty: 6 TABLET | Refills: 0 | Status: SHIPPED | OUTPATIENT
Start: 2019-12-12 | End: 2019-12-17

## 2020-02-19 ENCOUNTER — TELEPHONE (OUTPATIENT)
Dept: HEMATOLOGY/ONCOLOGY | Facility: CLINIC | Age: 70
End: 2020-02-19

## 2020-02-19 DIAGNOSIS — Z17.1 MALIGNANT NEOPLASM OF LEFT BREAST IN FEMALE, ESTROGEN RECEPTOR NEGATIVE, UNSPECIFIED SITE OF BREAST (CMS/HCC): Primary | ICD-10-CM

## 2020-02-19 DIAGNOSIS — C50.912 MALIGNANT NEOPLASM OF LEFT BREAST IN FEMALE, ESTROGEN RECEPTOR NEGATIVE, UNSPECIFIED SITE OF BREAST (CMS/HCC): Primary | ICD-10-CM

## 2020-02-19 RX ORDER — ZOLPIDEM TARTRATE 10 MG/1
TABLET ORAL
Qty: 30 TABLET | Refills: 1 | Status: SHIPPED | OUTPATIENT
Start: 2020-02-19 | End: 2020-09-25

## 2020-02-19 NOTE — TELEPHONE ENCOUNTER
Ruthy gudino called for herself and huey gamble their f/u for fri. wcb to rescheule for next mo but said they both needs labs done prior. If so can you enter orders?

## 2020-02-19 NOTE — TELEPHONE ENCOUNTER
Medicine Refill Request    Last Office Visit: 3/5/2019  Next Office Visit: Visit date not found        Current Outpatient Medications:   •  meloxicam (MOBIC) 7.5 mg tablet, Take 1 tablet (7.5 mg total) by mouth daily., Disp: 30 tablet, Rfl: 0  •  zolpidem (AMBIEN) 10 mg tablet, Take 1 tablet (10 mg total) by mouth nightly as needed for sleep., Disp: 30 tablet, Rfl: 3      BP Readings from Last 3 Encounters:   03/05/19 122/80   01/11/19 111/82   10/15/18 102/72       Recent Lab results:  Lab Results   Component Value Date    CHOL 188 06/01/2018   ,   Lab Results   Component Value Date    HDL 80 06/01/2018   ,   Lab Results   Component Value Date    LDLCALC 94 06/01/2018   ,   Lab Results   Component Value Date    TRIG 71 06/01/2018        Lab Results   Component Value Date    GLUCOSE 94 01/07/2019   , No results found for: HGBA1C      Lab Results   Component Value Date    CREATININE 0.8 01/07/2019       Lab Results   Component Value Date    TSH 1.49 06/01/2018

## 2020-06-19 ENCOUNTER — APPOINTMENT (OUTPATIENT)
Dept: LAB | Age: 70
End: 2020-06-19
Attending: INTERNAL MEDICINE
Payer: MEDICARE

## 2020-06-19 DIAGNOSIS — C50.912 MALIGNANT NEOPLASM OF LEFT BREAST IN FEMALE, ESTROGEN RECEPTOR NEGATIVE, UNSPECIFIED SITE OF BREAST (CMS/HCC): ICD-10-CM

## 2020-06-19 DIAGNOSIS — Z17.1 MALIGNANT NEOPLASM OF LEFT BREAST IN FEMALE, ESTROGEN RECEPTOR NEGATIVE, UNSPECIFIED SITE OF BREAST (CMS/HCC): ICD-10-CM

## 2020-06-19 DIAGNOSIS — Z17.1 ESTROGEN RECEPTOR NEGATIVE: Primary | ICD-10-CM

## 2020-06-19 LAB
ALBUMIN SERPL-MCNC: 4.2 G/DL (ref 3.4–5)
ALP SERPL-CCNC: 74 IU/L (ref 35–126)
ALT SERPL-CCNC: 12 IU/L (ref 11–54)
ANION GAP SERPL CALC-SCNC: 11 MEQ/L (ref 3–15)
ANISOCYTOSIS BLD QL SMEAR: ABNORMAL
AST SERPL-CCNC: 18 IU/L (ref 15–41)
BASOPHILS # BLD: 0.03 K/UL (ref 0.01–0.1)
BASOPHILS NFR BLD: 0.6 %
BILIRUB SERPL-MCNC: 0.5 MG/DL (ref 0.3–1.2)
BUN SERPL-MCNC: 12 MG/DL (ref 8–20)
CALCIUM SERPL-MCNC: 9.8 MG/DL (ref 8.9–10.3)
CHLORIDE SERPL-SCNC: 106 MEQ/L (ref 98–109)
CO2 SERPL-SCNC: 23 MEQ/L (ref 22–32)
CREAT SERPL-MCNC: 0.7 MG/DL (ref 0.6–1.1)
DIFFERENTIAL METHOD BLD: ABNORMAL
EOSINOPHIL # BLD: 0.15 K/UL (ref 0.04–0.36)
EOSINOPHIL NFR BLD: 2.9 %
ERYTHROCYTE [DISTWIDTH] IN BLOOD BY AUTOMATED COUNT: 18.1 % (ref 11.7–14.4)
GFR SERPL CREATININE-BSD FRML MDRD: >60 ML/MIN/1.73M*2
GLUCOSE SERPL-MCNC: 89 MG/DL (ref 70–99)
HCT VFR BLDCO AUTO: 40.5 % (ref 35–45)
HGB BLD-MCNC: 12.4 G/DL (ref 11.8–15.7)
HYPOCHROMIA BLD QL SMEAR: ABNORMAL
IMM GRANULOCYTES # BLD AUTO: 0.02 K/UL (ref 0–0.08)
IMM GRANULOCYTES NFR BLD AUTO: 0.4 %
LYMPHOCYTES # BLD: 1.69 K/UL (ref 1.2–3.5)
LYMPHOCYTES NFR BLD: 32.6 %
MCH RBC QN AUTO: 20 PG (ref 28–33.2)
MCHC RBC AUTO-ENTMCNC: 30.6 G/DL (ref 32.2–35.5)
MCV RBC AUTO: 65.4 FL (ref 83–98)
MICROCYTES BLD QL SMEAR: ABNORMAL
MONOCYTES # BLD: 0.37 K/UL (ref 0.28–0.8)
MONOCYTES NFR BLD: 7.1 %
NEUTROPHILS # BLD: 2.93 K/UL (ref 1.7–7)
NEUTS SEG NFR BLD: 56.4 %
NRBC BLD-RTO: 0 %
PDW BLD AUTO: 9.5 FL (ref 9.4–12.3)
PLAT MORPH BLD: NORMAL
PLATELET # BLD AUTO: 297 K/UL (ref 150–369)
PLATELET # BLD EST: ABNORMAL 10*3/UL
POIKILOCYTOSIS BLD QL SMEAR: ABNORMAL
POLYCHROMASIA BLD QL SMEAR: ABNORMAL
POTASSIUM SERPL-SCNC: 4.6 MEQ/L (ref 3.6–5.1)
PROT SERPL-MCNC: 7 G/DL (ref 6–8.2)
RBC # BLD AUTO: 6.19 M/UL (ref 3.93–5.22)
SODIUM SERPL-SCNC: 140 MEQ/L (ref 136–144)
TARGETS BLD QL SMEAR: ABNORMAL
WBC # BLD AUTO: 5.19 K/UL (ref 3.8–10.5)

## 2020-06-19 PROCEDURE — 80053 COMPREHEN METABOLIC PANEL: CPT

## 2020-06-19 PROCEDURE — 85025 COMPLETE CBC W/AUTO DIFF WBC: CPT

## 2020-06-19 PROCEDURE — 36415 COLL VENOUS BLD VENIPUNCTURE: CPT

## 2020-06-26 ENCOUNTER — OFFICE VISIT (OUTPATIENT)
Dept: HEMATOLOGY/ONCOLOGY | Facility: CLINIC | Age: 70
End: 2020-06-26
Attending: INTERNAL MEDICINE
Payer: MEDICARE

## 2020-06-26 VITALS
OXYGEN SATURATION: 99 % | TEMPERATURE: 97.7 F | HEART RATE: 67 BPM | SYSTOLIC BLOOD PRESSURE: 119 MMHG | BODY MASS INDEX: 24.88 KG/M2 | DIASTOLIC BLOOD PRESSURE: 62 MMHG | WEIGHT: 127.4 LBS

## 2020-06-26 DIAGNOSIS — Z17.1 MALIGNANT NEOPLASM OF LEFT BREAST IN FEMALE, ESTROGEN RECEPTOR NEGATIVE, UNSPECIFIED SITE OF BREAST (CMS/HCC): Primary | ICD-10-CM

## 2020-06-26 DIAGNOSIS — C50.912 MALIGNANT NEOPLASM OF LEFT BREAST IN FEMALE, ESTROGEN RECEPTOR NEGATIVE, UNSPECIFIED SITE OF BREAST (CMS/HCC): Primary | ICD-10-CM

## 2020-06-26 PROCEDURE — 99214 OFFICE O/P EST MOD 30 MIN: CPT | Performed by: INTERNAL MEDICINE

## 2020-06-26 RX ORDER — CHOLECALCIFEROL (VITAMIN D3) 25 MCG
1000 TABLET ORAL DAILY
Status: ON HOLD | COMMUNITY
End: 2022-09-19

## 2020-06-26 RX ORDER — DICLOFENAC SODIUM 10 MG/G
GEL TOPICAL
COMMUNITY
Start: 2020-05-31 | End: 2022-06-03

## 2020-06-26 ASSESSMENT — ENCOUNTER SYMPTOMS
CARDIOVASCULAR NEGATIVE: 1
DIZZINESS: 1
PSYCHIATRIC NEGATIVE: 1
RESPIRATORY NEGATIVE: 1
CONSTITUTIONAL NEGATIVE: 1
EYES NEGATIVE: 1
GASTROINTESTINAL NEGATIVE: 1
BACK PAIN: 1
HEMATOLOGIC/LYMPHATIC NEGATIVE: 1
LIGHT-HEADEDNESS: 1
ENDOCRINE NEGATIVE: 1

## 2020-06-26 ASSESSMENT — PAIN SCALES - GENERAL: PAINLEVEL: 0-NO PAIN

## 2020-06-26 NOTE — PROGRESS NOTES
Ruthy Kaminski is a 69 y.o. female, with BRCA1 associated bilateral breast cancers  :  1950    Encounter Diagnosis   Name Primary?   • Malignant neoplasm of left breast in female, estrogen receptor negative, unspecified site of breast (CMS/HCC) Yes       Cancer Staging  Malignant neoplasm of breast (CMS/HCC)  Staging form: Breast, AJCC 8th Edition  - Clinical: No stage assigned - Unsigned  - Pathologic stage from 2019: pT1c, pN0(sn), cM0, ER: Negative, AR: Negative, HER2: Negative - Signed by Marco Antonio Aguillon DO on 2019      Oncology History      Diagnoses:  1. : Left breast cancer, infiltrating ductal ER/AR-negative, HER 2-negative, lymph node negative disease.  2. 2008: Bilateral ductal carcinoma in situ.  3. 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    Treatment History:  1. 10/21/05-06: Adjuvant dose dense Adriamycin, cyclophosphamide and paclitaxel.  2. 10/28/08: Bilateral mastectomy.  3. 06: Bilateral salpingo oophorectomy.            Malignant neoplasm of breast (CMS/HCC)    5/3/2018 Initial Diagnosis     Malignant neoplasm of breast (CMS/HCC) ([facility])      2019 Cancer Staged     Staging form: Breast, AJCC 8th Edition  - Pathologic stage from 2019: pT1c, pN0(sn), cM0, ER: Negative, AR: Negative, HER2: Negative         Patient Active Problem List   Diagnosis   • Malignant neoplasm of breast (CMS/HCC)   • Beta thalassemia trait   • Neck pain   • Baker's cyst of knee, left   • Insomnia   • Medicare annual wellness visit, subsequent   • Chronic low back pain       History of Present Illness  HPI  Ruthy Kaminski is seen today in follow up.  Patient returns to the [place] site of the [facility] regarding her bilateral breast cancers and BRCA1 mutation.  Since her last visit clinically she is been doing well.  She complains no fevers, night sweats or weight loss.  Appetite is been good.  Her only complaint really is of intermittent discomfort in the lateral aspect of  her left breast implant.  She is planning to go back to see Dr. Person of plastic surgery.  She is noticed no masses in the skin or chest wall.  Complains of no consistent back pain.  He has had no nausea or vomiting.  She has no dyspnea, cough hemoptysis.  She denies any palpitations or angina.  She is noticed no lower extremity edema.  Review of Systems - Oncology    Review of Systems:  Nursing assessment reviewed. Pertinent positive and negative symptoms noted in HPI, all others negative.  Review of Systems   Constitutional: Negative.    HENT:  Negative.    Eyes: Negative.    Respiratory: Negative.    Cardiovascular: Negative.    Gastrointestinal: Negative.    Endocrine: Negative.    Genitourinary: Negative.     Musculoskeletal: Positive for back pain.   Skin: Negative.    Neurological: Positive for dizziness and light-headedness.   Hematological: Negative.    Psychiatric/Behavioral: Negative.      Temp:  [36.5 °C (97.7 °F)] 36.5 °C (97.7 °F)  Heart Rate:  [67] 67  BP: (119)/(62) 119/62  Visit Vitals  /62   Pulse 67   Temp 36.5 °C (97.7 °F) (Temporal)   Wt 57.8 kg (127 lb 6.4 oz)   SpO2 99%   BMI 24.88 kg/m²     No data recorded  Physical Exam   Constitutional: She is oriented to person, place, and time. She appears well-developed and well-nourished.   Patient's ECOG performance status is 0   HENT:   Head: Normocephalic and atraumatic.   Eyes: Conjunctivae are normal. No scleral icterus.   Neck: No thyromegaly present.   There is no adenopathy in the superior inferior clavicular region or in either axilla.   Cardiovascular: Normal rate, regular rhythm and normal heart sounds.   No murmur heard.  Pulmonary/Chest: Effort normal and breath sounds normal. No respiratory distress. She has no wheezes. She has no rales.   Patient had bilateral mastectomies with implants.  There are no lesions in the scar, skin or chest wall.  I cannot reproduce her discomfort in the left lateral breast implant by palpating the  area.   Abdominal: Soft. Bowel sounds are normal. She exhibits no mass. There is no tenderness.   There is no enlargement of the liver or spleen.   Musculoskeletal: She exhibits no edema.   Lymphadenopathy:     She has no cervical adenopathy.   Neurological: She is alert and oriented to person, place, and time.   Skin: Skin is warm and dry. She is not diaphoretic.   No petechiae are seen.   Psychiatric: She has a normal mood and affect. Her behavior is normal.   Nursing note and vitals reviewed.      Past Medical History:   Diagnosis Date   • Baker's cyst of knee, left 9/19/2018   • Beta thalassemia (CMS/HCC)    • Breast cancer (CMS/HCC)    • Osteoporosis        OB History   No data available     Gynecologic History:  Age at menarche: No age on file  Age at first live birth: No age on file   Age at menopause: No age on file    Past Surgical History:   Procedure Laterality Date   • EYE SURGERY     • HYSTERECTOMY     • MASTECTOMY         Social History     Tobacco Use   • Smoking status: Never Smoker   • Smokeless tobacco: Never Used   Substance Use Topics   • Alcohol use: Yes     Comment: socially   • Drug use: No       Family History   Problem Relation Age of Onset   • Breast cancer Biological Mother    • Thyroid cancer Biological Mother    • Polymyalgia rheumatica Biological Mother    • Cancer Biological Father    • Heart disease Paternal Grandmother    • Thyroid disease Paternal Grandfather    • Cancer Paternal Grandfather          Allergies  Keflex [cephalexin]; Penicillin g; Cephalexin monohydrate; and Sulfa (sulfonamide antibiotics)    Medications    Current Outpatient Medications:   •  cholecalciferol, vitamin D3, (cholecalciferol) 1,000 unit (25 mcg) tablet, Take 1,000 Units by mouth daily., Disp: , Rfl:   •  cranberry 400 mg capsule, Take 2 capsules by mouth., Disp: , Rfl:   •  diclofenac sodium (VOLTAREN) 1 % topical gel, APPLY ONE INCH TO AFFECTED AREA UP TO 3 TIMES DAILY., Disp: , Rfl:   •  meloxicam  (MOBIC) 7.5 mg tablet, Take 1 tablet (7.5 mg total) by mouth daily., Disp: 30 tablet, Rfl: 0  •  zolpidem (AMBIEN) 10 mg tablet, TAKE 1 TABLET BY MOUTH NIGHTLY AS NEEDED FOR SLEEP, Disp: 30 tablet, Rfl: 1     Laboratory    Recent Results (from the past 504 hour(s))   Comprehensive metabolic panel    Collection Time: 06/19/20 10:01 AM   Result Value Ref Range    Sodium 140 136 - 144 mEQ/L    Potassium 4.6 3.6 - 5.1 mEQ/L    Chloride 106 98 - 109 mEQ/L    CO2 23 22 - 32 mEQ/L    BUN 12 8 - 20 mg/dL    Creatinine 0.7 0.6 - 1.1 mg/dL    Glucose 89 70 - 99 mg/dL    Calcium 9.8 8.9 - 10.3 mg/dL    AST (SGOT) 18 15 - 41 IU/L    ALT (SGPT) 12 11 - 54 IU/L    Alkaline Phosphatase 74 35 - 126 IU/L    Total Protein 7.0 6.0 - 8.2 g/dL    Albumin 4.2 3.4 - 5.0 g/dL    Bilirubin, Total 0.5 0.3 - 1.2 mg/dL    eGFR >60.0 >=60.0 mL/min/1.73m*2    Anion Gap 11 3 - 15 mEQ/L   CBC and Differential    Collection Time: 06/19/20 10:01 AM   Result Value Ref Range    WBC 5.19 3.80 - 10.50 K/uL    RBC 6.19 (H) 3.93 - 5.22 M/uL    Hemoglobin 12.4 11.8 - 15.7 g/dL    Hematocrit 40.5 35.0 - 45.0 %    MCV 65.4 (L) 83.0 - 98.0 fL    MCH 20.0 (L) 28.0 - 33.2 pg    MCHC 30.6 (L) 32.2 - 35.5 g/dL    RDW 18.1 (H) 11.7 - 14.4 %    Platelets 297 150 - 369 K/uL    MPV 9.5 9.4 - 12.3 fL    Differential Type Auto     nRBC 0.0 <=0.0 %    Immature Granulocytes 0.4 %    Neutrophils 56.4 %    Lymphocytes 32.6 %    Monocytes 7.1 %    Eosinophils 2.9 %    Basophils 0.6 %    Immature Granulocytes, Absolute 0.02 0.00 - 0.08 K/uL    Neutrophils, Absolute 2.93 1.70 - 7.00 K/uL    Lymphocytes, Absolute 1.69 1.20 - 3.50 K/uL    Monocytes, Absolute 0.37 0.28 - 0.80 K/uL    Eosinophils, Absolute 0.15 0.04 - 0.36 K/uL    Basophils, Absolute 0.03 0.01 - 0.10 K/uL    PLT Morphology Normal     Platelet Estimate Adequate (150,000-400,000)     Anisocytosis 1+     Hypochromia 2+     Microcytes 2+     Poikilocytes 1+     Polychromasia Occasional     Target Cells Occasional           Radiology    No results found.    Assessment and Plan  Malignant neoplasm of breast (CMS/HCC) (HCC)  Ruthy returns to the [facility] at [place] regarding her history of bilateral breast cancers BRCA1 mutation.  Since her last visit clinically she been doing well.  Her only complaint is some discomfort in the left lateral breast implant.  There is no evidence of any recurrent disease at this time and she will continue with yearly follow-up.      Marco Antonio Aguillon DO, MS  Medical Director, [facility]

## 2020-06-26 NOTE — ASSESSMENT & PLAN NOTE
Ruthy returns to the [facility] [place] regarding her history of bilateral breast cancers BRCA1 mutation.  Since her last visit clinically she been doing well.  Her only complaint is some discomfort in the left lateral breast implant.  There is no evidence of any recurrent disease at this time and she will continue with yearly follow-up.

## 2020-06-26 NOTE — LETTER
June 26, 2020    Patient: Ruthy Kaminski   YOB: 1950   Date of Visit: 6/26/2020       Dear Dr. Lemus:    The patient is seen back at the [facility] IN [place] today in follow up with regard to her   1. Malignant neoplasm of left breast in female, estrogen receptor negative, unspecified site of breast (CMS/HCC)    . Attached is my assessment and plan of care.         Sincerely,        Marco Antonio Aguillon DO    CC: No Recipients

## 2020-06-26 NOTE — PROGRESS NOTES
Review of Systems   Constitutional: Negative.    HENT:  Negative.    Eyes: Negative.    Respiratory: Negative.    Cardiovascular: Negative.    Gastrointestinal: Negative.    Endocrine: Negative.    Genitourinary: Negative.     Musculoskeletal: Positive for back pain.   Skin: Negative.    Neurological: Positive for dizziness and light-headedness.   Hematological: Negative.    Psychiatric/Behavioral: Negative.

## 2020-08-25 ENCOUNTER — OFFICE VISIT (OUTPATIENT)
Dept: FAMILY MEDICINE | Facility: CLINIC | Age: 70
End: 2020-08-25
Payer: MEDICARE

## 2020-08-25 VITALS
WEIGHT: 129.8 LBS | HEART RATE: 75 BPM | TEMPERATURE: 97.5 F | BODY MASS INDEX: 25.48 KG/M2 | HEIGHT: 60 IN | DIASTOLIC BLOOD PRESSURE: 84 MMHG | RESPIRATION RATE: 18 BRPM | SYSTOLIC BLOOD PRESSURE: 118 MMHG

## 2020-08-25 DIAGNOSIS — N39.0 URINARY TRACT INFECTION WITHOUT HEMATURIA, SITE UNSPECIFIED: Primary | ICD-10-CM

## 2020-08-25 LAB
BILIRUBIN, POC: NEGATIVE
BLOOD URINE, POC: ABNORMAL
CLARITY, POC: ABNORMAL
COLOR, POC: YELLOW
GLUCOSE URINE, POC: NEGATIVE
KETONES, POC: NEGATIVE
LEUKOCYTE EST, POC: ABNORMAL
NITRITE, POC: NEGATIVE
PH, POC: 5
PROTEIN, POC: ABNORMAL
SPECIFIC GRAVITY, POC: 1
UROBILINOGEN, POC: >=8

## 2020-08-25 PROCEDURE — 81002 URINALYSIS NONAUTO W/O SCOPE: CPT | Performed by: FAMILY MEDICINE

## 2020-08-25 PROCEDURE — 99214 OFFICE O/P EST MOD 30 MIN: CPT | Performed by: FAMILY MEDICINE

## 2020-08-25 RX ORDER — PANTOPRAZOLE SODIUM 40 MG/1
40 TABLET, DELAYED RELEASE ORAL DAILY PRN
COMMUNITY
Start: 2020-07-21 | End: 2025-01-16 | Stop reason: ALTCHOICE

## 2020-08-25 RX ORDER — NITROFURANTOIN 25; 75 MG/1; MG/1
100 CAPSULE ORAL 2 TIMES DAILY
Qty: 14 CAPSULE | Refills: 2 | Status: SHIPPED | OUTPATIENT
Start: 2020-08-25 | End: 2020-09-01

## 2020-08-25 NOTE — PROGRESS NOTES
Subjective      Patient ID: Ruthy Kaminski is a 69 y.o. female.    HPI patient with a history of recurrent UTIs reports 3 days of increasing frequency and urgency.  She does not have burning, fever, back pain, nausea, vomiting, diarrhea or any abdominal pain.  She reports Macrobid typically works very well for her.  I reviewed her allergies.  She has seen urology regarding this issue and previous instances and been evaluated.    Tobacco  Allergies  Meds  Problems  Med Hx  Surg Hx  Fam Hx       Review of Systems   All other systems reviewed and are negative.    Allergies   Allergen Reactions   • Keflex [Cephalexin]    • Penicillin G    • Cephalexin Monohydrate Rash     KEFLEX   • Sulfa (Sulfonamide Antibiotics) Rash     Current Outpatient Medications   Medication Sig Dispense Refill   • cholecalciferol, vitamin D3, (cholecalciferol) 1,000 unit (25 mcg) tablet Take 1,000 Units by mouth daily.     • cranberry 400 mg capsule Take 2 capsules by mouth.     • diclofenac sodium (VOLTAREN) 1 % topical gel APPLY ONE INCH TO AFFECTED AREA UP TO 3 TIMES DAILY.     • meloxicam (MOBIC) 7.5 mg tablet Take 1 tablet (7.5 mg total) by mouth daily. 30 tablet 0   • nitrofurantoin, macrocrystal-monohydrate, (MACROBID) 100 mg capsule Take 1 capsule (100 mg total) by mouth 2 (two) times a day for 7 days. 14 capsule 2   • pantoprazole (PROTONIX) 40 mg EC tablet Take 40 mg by mouth once daily.     • zolpidem (AMBIEN) 10 mg tablet TAKE 1 TABLET BY MOUTH NIGHTLY AS NEEDED FOR SLEEP 30 tablet 1     No current facility-administered medications for this visit.      Past Medical History:   Diagnosis Date   • Baker's cyst of knee, left 9/19/2018   • Beta thalassemia (CMS/HCC)    • Breast cancer (CMS/HCC)    • Osteoporosis      Past Surgical History:   Procedure Laterality Date   • EYE SURGERY     • HYSTERECTOMY     • MASTECTOMY       Social History     Tobacco Use   • Smoking status: Never Smoker   • Smokeless tobacco: Never Used   Substance  Use Topics   • Alcohol use: Yes     Comment: socially   • Drug use: No     Family History   Problem Relation Age of Onset   • Breast cancer Biological Mother    • Thyroid cancer Biological Mother    • Polymyalgia rheumatica Biological Mother    • Cancer Biological Father    • Heart disease Paternal Grandmother    • Thyroid disease Paternal Grandfather    • Cancer Paternal Grandfather        Objective   Vitals:    08/25/20 0924   BP: 118/84   Pulse: 75   Resp: 18   Temp: 36.4 °C (97.5 °F)   Weight: 58.9 kg (129 lb 12.8 oz)   Height: 1.524 m (5')    Body mass index is 25.35 kg/m².  Physical Exam   Constitutional: She is oriented to person, place, and time. She appears well-developed. No distress.   Neurological: She is alert and oriented to person, place, and time.   Psychiatric: She has a normal mood and affect. Her speech is normal and behavior is normal.       Assessment/Plan   Problem List Items Addressed This Visit     None      Visit Diagnoses     Urinary tract infection without hematuria, site unspecified    -  Primary    Relevant Medications    nitrofurantoin, macrocrystal-monohydrate, (MACROBID) 100 mg capsule    Other Relevant Orders    POCT urinalysis dipstick (Completed)    Urine culture      Check culture, treat with Macrobid.  Refills given on this prescription given the fact that she has this issue recurrently.    Patient agrees and verbalizes understanding of medication and treatment plan discussed at today's visit.  Patient was instructed to call or follow-up if symptoms persist or worsen.    No notes on file    Jayme Donald, DO    This note was created with voice recognition software. Inadvertent dictation errors should be disregarded. Please contact my office for clarification.

## 2020-08-28 LAB
BACTERIA UR CULT: ABNORMAL
BACTERIA UR CULT: ABNORMAL
OTHER ANTIBIOTIC SUSC ISLT: ABNORMAL

## 2020-09-24 NOTE — TELEPHONE ENCOUNTER
Medicine Refill Request    Last Office Visit: 8/25/2020  Last Telemedicine Visit: Visit date not found    Next Office Visit: Visit date not found  Next Telemedicine Visit: Visit date not found         Current Outpatient Medications:   •  cholecalciferol, vitamin D3, (cholecalciferol) 1,000 unit (25 mcg) tablet, Take 1,000 Units by mouth daily., Disp: , Rfl:   •  cranberry 400 mg capsule, Take 2 capsules by mouth., Disp: , Rfl:   •  diclofenac sodium (VOLTAREN) 1 % topical gel, APPLY ONE INCH TO AFFECTED AREA UP TO 3 TIMES DAILY., Disp: , Rfl:   •  meloxicam (MOBIC) 7.5 mg tablet, Take 1 tablet (7.5 mg total) by mouth daily., Disp: 30 tablet, Rfl: 0  •  pantoprazole (PROTONIX) 40 mg EC tablet, Take 40 mg by mouth once daily., Disp: , Rfl:   •  zolpidem (AMBIEN) 10 mg tablet, TAKE 1 TABLET BY MOUTH NIGHTLY AS NEEDED FOR SLEEP, Disp: 30 tablet, Rfl: 1      BP Readings from Last 3 Encounters:   08/25/20 118/84   06/26/20 119/62   03/05/19 122/80       Recent Lab results:  Lab Results   Component Value Date    CHOL 188 06/01/2018   ,   Lab Results   Component Value Date    HDL 80 06/01/2018   ,   Lab Results   Component Value Date    LDLCALC 94 06/01/2018   ,   Lab Results   Component Value Date    TRIG 71 06/01/2018        Lab Results   Component Value Date    GLUCOSE 89 06/19/2020   , No results found for: HGBA1C      Lab Results   Component Value Date    CREATININE 0.7 06/19/2020       Lab Results   Component Value Date    TSH 1.49 06/01/2018

## 2020-09-25 RX ORDER — ZOLPIDEM TARTRATE 10 MG/1
TABLET ORAL
Qty: 30 TABLET | Refills: 3 | Status: SHIPPED | OUTPATIENT
Start: 2020-09-25 | End: 2020-11-12 | Stop reason: ALTCHOICE

## 2020-11-12 ENCOUNTER — OFFICE VISIT (OUTPATIENT)
Dept: FAMILY MEDICINE | Facility: CLINIC | Age: 70
End: 2020-11-12
Payer: MEDICARE

## 2020-11-12 VITALS
TEMPERATURE: 97.1 F | SYSTOLIC BLOOD PRESSURE: 122 MMHG | DIASTOLIC BLOOD PRESSURE: 90 MMHG | HEIGHT: 60 IN | HEART RATE: 74 BPM | WEIGHT: 128.6 LBS | BODY MASS INDEX: 25.25 KG/M2

## 2020-11-12 DIAGNOSIS — Z17.1 MALIGNANT NEOPLASM OF LEFT BREAST IN FEMALE, ESTROGEN RECEPTOR NEGATIVE, UNSPECIFIED SITE OF BREAST (CMS/HCC): ICD-10-CM

## 2020-11-12 DIAGNOSIS — Z00.00 MEDICARE ANNUAL WELLNESS VISIT, SUBSEQUENT: Primary | ICD-10-CM

## 2020-11-12 DIAGNOSIS — C50.912 MALIGNANT NEOPLASM OF LEFT BREAST IN FEMALE, ESTROGEN RECEPTOR NEGATIVE, UNSPECIFIED SITE OF BREAST (CMS/HCC): ICD-10-CM

## 2020-11-12 DIAGNOSIS — F51.04 PSYCHOPHYSIOLOGICAL INSOMNIA: ICD-10-CM

## 2020-11-12 PROCEDURE — G0439 PPPS, SUBSEQ VISIT: HCPCS | Performed by: FAMILY MEDICINE

## 2020-11-12 RX ORDER — TRAZODONE HYDROCHLORIDE 50 MG/1
50 TABLET ORAL NIGHTLY
Qty: 90 TABLET | Refills: 3 | Status: SHIPPED | OUTPATIENT
Start: 2020-11-12 | End: 2020-12-21 | Stop reason: SDUPTHER

## 2020-11-12 ASSESSMENT — PATIENT HEALTH QUESTIONNAIRE - PHQ9: SUM OF ALL RESPONSES TO PHQ9 QUESTIONS 1 & 2: 0

## 2020-11-12 ASSESSMENT — ENCOUNTER SYMPTOMS
NECK PAIN: 0
SINUS PAIN: 0
HEADACHES: 0
SLEEP DISTURBANCE: 1
PALPITATIONS: 0
POLYDIPSIA: 0
BACK PAIN: 1
SINUS PRESSURE: 0
DIFFICULTY URINATING: 0
DIARRHEA: 0
NAUSEA: 0
POLYPHAGIA: 0
BRUISES/BLEEDS EASILY: 0
SHORTNESS OF BREATH: 0
NERVOUS/ANXIOUS: 1
FREQUENCY: 0
FEVER: 0
DIZZINESS: 0
ADENOPATHY: 0
CHILLS: 0
CONSTIPATION: 0
ABDOMINAL PAIN: 0
COUGH: 0
VOMITING: 0

## 2020-11-12 ASSESSMENT — ACTIVITIES OF DAILY LIVING (ADL)
ADEQUATE_TO_COMPLETE_ADL: YES
PATIENT'S MEMORY ADEQUATE TO SAFELY COMPLETE DAILY ACTIVITIES?: YES

## 2020-11-12 NOTE — PROGRESS NOTES
Subjective      Patient ID: Ruthy Kaminski is a 70 y.o. female.  1950      Patient presents for annual wellness visit.  She is not sleeping well.  Her stress has increased.  She feels anxious most days.  She is caring for her elderly mother.       The following have been reviewed and updated as appropriate in this visit:  Tobacco  Allergies  Meds  Problems  Med Hx  Surg Hx  Fam Hx       Review of Systems   Constitutional: Negative for chills and fever.   HENT: Negative for dental problem, sinus pressure and sinus pain.    Eyes: Negative for visual disturbance.   Respiratory: Negative for cough and shortness of breath.    Cardiovascular: Negative for chest pain, palpitations and leg swelling.   Gastrointestinal: Negative for abdominal pain, constipation, diarrhea, nausea and vomiting.   Endocrine: Negative for cold intolerance, heat intolerance, polydipsia, polyphagia and polyuria.   Genitourinary: Negative for difficulty urinating, frequency and urgency.   Musculoskeletal: Positive for back pain. Negative for neck pain.   Allergic/Immunologic: Negative for environmental allergies and food allergies.   Neurological: Negative for dizziness and headaches.   Hematological: Negative for adenopathy. Does not bruise/bleed easily.   Psychiatric/Behavioral: Positive for sleep disturbance. Negative for behavioral problems. The patient is nervous/anxious.        Objective     Vitals:    11/12/20 0843   BP: 122/90   Pulse: 74   Temp: 36.2 °C (97.1 °F)   Weight: 58.3 kg (128 lb 9.6 oz)   Height: 1.524 m (5')     Body mass index is 25.12 kg/m².    Physical Exam  Vitals signs reviewed.   Constitutional:       Appearance: Normal appearance. She is well-developed.   HENT:      Head: Normocephalic and atraumatic.      Nose: Nose normal.   Eyes:      Conjunctiva/sclera: Conjunctivae normal.      Pupils: Pupils are equal, round, and reactive to light.   Neck:      Musculoskeletal: Neck supple.      Thyroid: No  thyromegaly.   Cardiovascular:      Rate and Rhythm: Normal rate and regular rhythm.      Heart sounds: Normal heart sounds. No murmur.   Pulmonary:      Effort: Pulmonary effort is normal.      Breath sounds: Normal breath sounds. No wheezing.   Abdominal:      General: Bowel sounds are normal.      Palpations: Abdomen is soft.      Tenderness: There is no abdominal tenderness.   Musculoskeletal: Normal range of motion.   Lymphadenopathy:      Cervical: No cervical adenopathy.   Skin:     General: Skin is warm and dry.      Findings: No rash.   Neurological:      Mental Status: She is alert and oriented to person, place, and time.   Psychiatric:         Mood and Affect: Mood is anxious.         Assessment/Plan   Diagnoses and all orders for this visit:    Medicare annual wellness visit, subsequent (Primary)  Assessment & Plan:  71 yo female presents for annual wellness visit  Forms completed, reviewed and scanned      Malignant neoplasm of left breast in female, estrogen receptor negative, unspecified site of breast (CMS/HCC)  Assessment & Plan:  No recurrence      Psychophysiological insomnia  Assessment & Plan:  Stop Ambien  Prescribed Trazodone 50mg at bedtime      Other orders  -     traZODone (DESYREL) 50 mg tablet; Take 1 tablet (50 mg total) by mouth nightly.      Subjective     Ruthy Kaminski is a 70 y.o. female who presents for a subsequent annual wellness visit.     Patient Care Team:  Soumya Lemus DO as PCP - Marco Antonio Marcos DO as Consulting Physician (Hematology and Oncology)  Sydni Bernardo [facility]    Comprehensive Medical and Social History  Patient Active Problem List   Diagnosis   • Malignant neoplasm of breast (CMS/HCC)   • Beta thalassemia trait   • Neck pain   • Baker's cyst of knee, left   • Insomnia   • Medicare annual wellness visit, subsequent   • Chronic low back pain     Past Medical History:   Diagnosis Date   • Baker's cyst of knee, left 9/19/2018   • Beta  thalassemia (CMS/[facility])    • Breast cancer (CMS/HCC)    • Osteoporosis      Past Surgical History:   Procedure Laterality Date   • EYE SURGERY     • HYSTERECTOMY     • MASTECTOMY       Allergies   Allergen Reactions   • Keflex [Cephalexin]    • Penicillin G    • Cephalexin Monohydrate Rash     KEFLEX   • Sulfa (Sulfonamide Antibiotics) Rash     Current Outpatient Medications   Medication Sig Dispense Refill   • cholecalciferol, vitamin D3, (cholecalciferol) 1,000 unit (25 mcg) tablet Take 1,000 Units by mouth daily.     • cranberry 400 mg capsule Take 2 capsules by mouth.     • diclofenac sodium (VOLTAREN) 1 % topical gel APPLY ONE INCH TO AFFECTED AREA UP TO 3 TIMES DAILY.     • pantoprazole (PROTONIX) 40 mg EC tablet Take 40 mg by mouth once daily.     • meloxicam (MOBIC) 7.5 mg tablet Take 1 tablet (7.5 mg total) by mouth daily. 30 tablet 0   • traZODone (DESYREL) 50 mg tablet Take 1 tablet (50 mg total) by mouth nightly. 90 tablet 3     No current facility-administered medications for this visit.      Social History     Tobacco Use   • Smoking status: Never Smoker   • Smokeless tobacco: Never Used   Substance Use Topics   • Alcohol use: Yes     Comment: socially   • Drug use: No     Family History   Problem Relation Age of Onset   • Breast cancer Biological Mother    • Thyroid cancer Biological Mother    • Polymyalgia rheumatica Biological Mother    • Cancer Biological Father    • Heart disease Paternal Grandmother    • Thyroid disease Paternal Grandfather    • Cancer Paternal Grandfather        Objective   Vitals  Vitals:    11/12/20 0843   BP: 122/90   Pulse: 74   Temp: 36.2 °C (97.1 °F)   Weight: 58.3 kg (128 lb 9.6 oz)   Height: 1.524 m (5')     Body mass index is 25.12 kg/m².    Advanced Care Plan  Does patient have advance directive?: Yes       Patient has Advance Directive: Advance Directive in physical chart                             PHQ  Will the froynet answer the depression questions?: Yes   Over the  past 2 weeks, how often have you been bothered by feeling little interest or pleasure in doing things?: Not at all   Over the past 2 weeks, how often have you been bothered by feeling down, depressed, or hopeless?: Not at all   Depression Risk: 0                                             Mini Cog   2/2 clock test  3/3 word recall      Get Up and Go  Result: Pass    STEADI Falls Risk  One or more falls in the last year: No           Has trouble stepping up onto a curb: No   Advised to use a cane or walker to get around safely: No   Often has to rush to the toilet: No   Feels unsteady when walking: No   Has lost some feeling in feet: No   Often feels sad or depressed: No   Steadies self on furniture while walking at home: No   Takes medication that makes him/her feel lightheaded or more tired than usual: No   Worried about falling: No   Takes medicine to sleep or improve mood: No   Needs to push with hands when rising from a chair: No   Falls screen completed: Yes     Hearing and Vision Screening  No exam data present  See HRA for relevant hearing screening response.    Assessment/Plan   Diagnoses and all orders for this visit:    Medicare annual wellness visit, subsequent (Primary)  Assessment & Plan:  69 yo female presents for annual wellness visit  Forms completed, reviewed and scanned      Malignant neoplasm of left breast in female, estrogen receptor negative, unspecified site of breast (CMS/HCC)  Assessment & Plan:  No recurrence      Psychophysiological insomnia  Assessment & Plan:  Stop Ambien  Prescribed Trazodone 50mg at bedtime      Other orders  -     traZODone (DESYREL) 50 mg tablet; Take 1 tablet (50 mg total) by mouth nightly.      See Patient Instructions (the written plan) which was given to the patient for PPPS and health risk factors with interventions.

## 2020-11-12 NOTE — PATIENT INSTRUCTIONS
Your Personalized Prevention Plan Services (PPPS)    Preventive Services Checklist (Assumes Average Risk Unless Otherwise Noted):    Health Maintenance Topics with due status: Overdue       Topic Date Due    Mammogram 1950    Annual Dilated Retinal Exam 08/30/1958    Diabetic Foot Exam 08/30/1958    Hemoglobin A1C 08/30/1958    Urine Protein Screening 08/30/1958    DTaP, Tdap, and Td Vaccines 08/30/1967    Varicella Vaccines 02/02/2015    Annual Falls Risk Screening 01/01/2020    Medicare Annual Wellness Visit 03/05/2020    Influenza Vaccine 08/01/2020    Zoster Vaccine 10/15/2020     Health Maintenance Topics with due status: Not Due       Topic Last Completion Date    Colonoscopy 03/01/2013    DEXA Scan 10/24/2019     Health Maintenance Topics with due status: Completed       Topic Last Completion Date    Hepatitis C Screening 06/03/2016    Pneumococcal 06/03/2016    Pneumococcal (65 years and older) 06/03/2016     Health Maintenance Topics with due status: Aged Out       Topic Date Due    Meningococcal ACWY Aged Out    HIB Vaccines Aged Out    IPV Vaccines Aged Out    HPV Vaccines Aged Out       You May Be Eligible for These Additional Preventive Services   (Assumes Average Risk Unless Otherwise Noted)  Diabetes Screening Any 1 risk factor: hypertension, dyslipidemia, obesity, high glucose; or Any 2 risk factors: >=66yo, overweight, family history diabetes (covered every 6 months)   Hepatitis C Screening Any 1 risk factor: 1) blood transfusion before 1992,   2) current or past injection drug use (annually for high risk; if born between 6092-4211, see above for status).   Vaccine: Hepatitis B As necessary if at-risk: hemophilia, ESRD, diabetes, living with individual infected with hep B, healthcare worker with frequent contact with blood/bodily fluids (series covered once)   Sexually Transmitted Diseases (STDs) As necessary chlamydia, gonorrhea, syphilis, hepatitis B (covered  annually)  HIV if any 1 risk factor present: 1) <14yo or >66yo and at increased risk or 2) 15-66yo and ask for it (covered annually)   Lung Cancer Screening Low dose chest CT if all 3 risk factors: 1) 55-76yo, 2) smoker or quit within last 15y, 3) >=30 pack years (covered annually).  No results found for this or any previous visit.     Cholesterol Screening Both risk factors: 1) >=19yo and 2)  increased risk coronary artery disease (covered every 5 years).     Breast Cancer Screening Covered once 35-40yo, annually >=39yo (if >=51yo, see above for status).     Glaucoma Screening Any 1 risk factor: 1) diabetes, 2) family history glaucoma, 3)  >=51yo, 4)  American >=66yo (covered annually)           Health Risk Factors with Personalized Education:  ----------------------------------------------------------------------------------------------------------------------  Controlling Your Blood Pressure  · Maintain a normal weight (body mass index between 18.5 and 24.9).  · Eat more fruit, vegetables and low-fat dairy.  · Eat less saturated fat and total fat.  · Lower your sodium (salt) intake.  Try to stay under 1500 mg per day, but if you cannot get your intake to be that low, at least lower it by 1000 mg.  · Stay active.  Try to get at least 90 to 150 minutes of exercise per week.  Try brisk walking, swimming, bicycling or dancing.  · Limit alcohol intake.  When you do consume alcohol, drink no more than 1 drink per day.  · If you have been prescribed medication, take it regularly and exactly as prescribed.  Let your PCP know if you have any problems or questions about your medication.  · Check your blood pressure at home or at the store.  Write down your readings and share them with your PCP  ----------------------------------------------------------------------------------------------------------------------  Controlling Your Diabetes  · Stress can raise your blood sugar.  Learn what causes your  stress.  Learn to lower your stress by spending time with family and friends, exercising, deep breathing, trying meditation or yoga, enjoying hobbies and getting enough rest.  Let your PCP know if you’re having problems limiting your stress.  · Maintain a healthy weight by balancing your diet and exercise.  · Choose foods that are lower in calories, saturated fat, trans fat, sugar and salt.  Eat foods with more fiber, like whole grain cereals, bread, crackers, rice or pasta.  Choose to eat fruit, vegetables, and low-fat or fat-free/skim dairy.  · Reduce the portion size of your meals.  Make half of your meal vegetables and fruit, and divide the other half between lean protein and whole grains.  · Drink water instead of juice and regular soda.  · Spend at least some time being active on most days of the week.  · Work to increase your muscle strength at least twice per week.  Try things like light weights, stretch bands, yoga, heavy gardening (digging, planting with tools) or push-ups.  · If you have been prescribed medication, take it regularly and exactly as prescribed.  Let your PCP know if you have any problems or questions about your medication.  · If you have been asked to check your blood sugar, write down your readings and share them with your PCP  ----------------------------------------------------------------------------------------------------------------------  Controlling Your Osteopenia, Strengthening Your Bones  · Try to get at least 90 to 150 minutes of weight-bearing exercise per week.  · Ensure intake of at least 1200mg of calcium per day.  Eat foods high in calcium like milk and other dairy, green vegetables, fruit, canned fish with soft and edible bones, nuts, calcium-set tofu.  Some foods are calcium-fortified, like bread, cereal, fruit juices and mineral water.  · Help your body make vitamin D by getting 10-15 minutes per day of sunlight.    · Ensure intake of at least 600IU of vitamin D per  day.  Eat foods high in vitamin D like oily fish (salmon, sardines, mackerel) and eggs.  Some foods are fortified with vitamin D, like dairy and cereals.  · Avoid high amounts of caffeine and salt, since they can cause the body to loose calcium.  · Limit alcohol intake, since it is associated with weaker bones and is associated with falls and fractures.  · Limit intake of fizzy drinks.  · If you have been prescribed medication, take it regularly and exactly as prescribed.  Let your PCP know if you have any problems or questions about your medication  ----------------------------------------------------------------------------------------------------------------------  Reducing Your Risk of Falls  · Tell your PCP if any of your medications make you feel tired, dizzy, lightheaded or off-balance.  · Maintain coordination, flexibility and balance by ensuring regular physical activity.  · Limit alcohol intake to 1 drink per day.  Consider avoiding all alcohol intake.  · Ensure good vision.  Visit an ophthalmologist or optometrist regularly for vision screening or to make sure your glasses / contact lens prescription is correct.  If you need glasses or contacts, wear them.  When you get new glasses or contacts, take time to get used to them.  Do not wear sunglasses or tinted lenses when indoors.  · Ensure good hearing.  Have your hearing checked if you are having trouble hearing, or family and friends think you cannot hear them.  If you need a hearing aid, be sure it fits well and wear it.  · Get enough rest.  Ensure about 7-9 hours of sleep every day.  · Get up slowly from your bed or chairs.  Do not start walking until you are sure you feel steady.  · Wear non-skid, rubber-soled, low-heeled shoes.  Do not walk in socks, or in shoes and slippers with smooth soles.  · If your PCP or therapist recommends using a cane or walker, use it regularly.  · Make your home safer.  Increase lighting throughout the house, especially  at the top and bottom of stairs.  Ensure lighting is easily turned on when getting up in the middle of the night.  Make sure there are two secure rails on all stairs.  Install grab bars in the bathtub / shower and near the toilet.  Consider using a shower chair and / or a hand-held shower.  · Spread sand or salt on icy surfaces.  Beware of wet surfaces, which can be icy.  · Tell your PCP if you have fallen.

## 2020-11-17 DIAGNOSIS — E55.9 VITAMIN D DEFICIENCY: ICD-10-CM

## 2020-11-17 DIAGNOSIS — D56.3 BETA THALASSEMIA TRAIT: Primary | ICD-10-CM

## 2020-12-21 RX ORDER — TRAZODONE HYDROCHLORIDE 100 MG/1
100 TABLET ORAL NIGHTLY
Qty: 90 TABLET | Refills: 3 | Status: SHIPPED | OUTPATIENT
Start: 2020-12-21 | End: 2021-10-22 | Stop reason: SDUPTHER

## 2021-02-08 ENCOUNTER — TELEPHONE (OUTPATIENT)
Dept: FAMILY MEDICINE | Facility: CLINIC | Age: 71
End: 2021-02-08

## 2021-02-08 DIAGNOSIS — D56.3 BETA THALASSEMIA TRAIT: ICD-10-CM

## 2021-02-08 DIAGNOSIS — I10 ESSENTIAL HYPERTENSION, BENIGN: ICD-10-CM

## 2021-02-08 DIAGNOSIS — E55.9 VITAMIN D DEFICIENCY: Primary | ICD-10-CM

## 2021-02-10 ENCOUNTER — APPOINTMENT (OUTPATIENT)
Dept: LAB | Age: 71
End: 2021-02-10
Attending: FAMILY MEDICINE
Payer: MEDICARE

## 2021-02-10 DIAGNOSIS — D56.3 BETA THALASSEMIA TRAIT: ICD-10-CM

## 2021-02-10 DIAGNOSIS — E55.9 VITAMIN D DEFICIENCY: ICD-10-CM

## 2021-02-10 DIAGNOSIS — I10 ESSENTIAL HYPERTENSION, BENIGN: ICD-10-CM

## 2021-02-10 LAB
25(OH)D3 SERPL-MCNC: 24 NG/ML (ref 30–100)
ALBUMIN SERPL-MCNC: 4.2 G/DL (ref 3.4–5)
ALP SERPL-CCNC: 65 IU/L (ref 35–126)
ALT SERPL-CCNC: 14 IU/L (ref 11–54)
ANION GAP SERPL CALC-SCNC: 10 MEQ/L (ref 3–15)
ANISOCYTOSIS BLD QL SMEAR: ABNORMAL
AST SERPL-CCNC: 19 IU/L (ref 15–41)
BASOPHILS # BLD: 0.03 K/UL (ref 0.01–0.1)
BASOPHILS NFR BLD: 0.5 %
BILIRUB SERPL-MCNC: 0.7 MG/DL (ref 0.3–1.2)
BILIRUB UR QL STRIP.AUTO: NEGATIVE MG/DL
BUN SERPL-MCNC: 20 MG/DL (ref 8–20)
CALCIUM SERPL-MCNC: 9.8 MG/DL (ref 8.9–10.3)
CHLORIDE SERPL-SCNC: 104 MEQ/L (ref 98–109)
CHOLEST SERPL-MCNC: 192 MG/DL
CLARITY UR REFRACT.AUTO: CLEAR
CO2 SERPL-SCNC: 28 MEQ/L (ref 22–32)
COLOR UR AUTO: YELLOW
CREAT SERPL-MCNC: 0.7 MG/DL (ref 0.6–1.1)
DIFFERENTIAL METHOD BLD: ABNORMAL
EOSINOPHIL # BLD: 0.19 K/UL (ref 0.04–0.36)
EOSINOPHIL NFR BLD: 3.4 %
ERYTHROCYTE [DISTWIDTH] IN BLOOD BY AUTOMATED COUNT: 18.4 % (ref 11.7–14.4)
GFR SERPL CREATININE-BSD FRML MDRD: >60 ML/MIN/1.73M*2
GLUCOSE SERPL-MCNC: 86 MG/DL (ref 70–99)
GLUCOSE UR STRIP.AUTO-MCNC: NEGATIVE MG/DL
HCT VFR BLDCO AUTO: 40.8 % (ref 35–45)
HDLC SERPL-MCNC: 79 MG/DL
HDLC SERPL: 2.4 {RATIO}
HGB BLD-MCNC: 12.3 G/DL (ref 11.8–15.7)
HGB UR QL STRIP.AUTO: NEGATIVE
IMM GRANULOCYTES # BLD AUTO: 0.01 K/UL (ref 0–0.08)
IMM GRANULOCYTES NFR BLD AUTO: 0.2 %
KETONES UR STRIP.AUTO-MCNC: NEGATIVE MG/DL
LDLC SERPL CALC-MCNC: 101 MG/DL
LEUKOCYTE ESTERASE UR QL STRIP.AUTO: NEGATIVE
LYMPHOCYTES # BLD: 1.65 K/UL (ref 1.2–3.5)
LYMPHOCYTES NFR BLD: 29.5 %
MCH RBC QN AUTO: 19.9 PG (ref 28–33.2)
MCHC RBC AUTO-ENTMCNC: 30.1 G/DL (ref 32.2–35.5)
MCV RBC AUTO: 66 FL (ref 83–98)
MICROCYTES BLD QL SMEAR: ABNORMAL
MONOCYTES # BLD: 0.34 K/UL (ref 0.28–0.8)
MONOCYTES NFR BLD: 6.1 %
NEUTROPHILS # BLD: 3.38 K/UL (ref 1.7–7)
NEUTS SEG NFR BLD: 60.3 %
NITRITE UR QL STRIP.AUTO: NEGATIVE
NONHDLC SERPL-MCNC: 113 MG/DL
NRBC BLD-RTO: 0 %
OVALOCYTES BLD QL SMEAR: ABNORMAL
PDW BLD AUTO: 9.5 FL (ref 9.4–12.3)
PH UR STRIP.AUTO: 6 [PH]
PLAT MORPH BLD: NORMAL
PLATELET # BLD AUTO: 323 K/UL (ref 150–369)
PLATELET # BLD EST: ABNORMAL 10*3/UL
POIKILOCYTOSIS BLD QL SMEAR: ABNORMAL
POLYCHROMASIA BLD QL SMEAR: ABNORMAL
POTASSIUM SERPL-SCNC: 4.6 MEQ/L (ref 3.6–5.1)
PROT SERPL-MCNC: 6.7 G/DL (ref 6–8.2)
PROT UR QL STRIP.AUTO: NEGATIVE
RBC # BLD AUTO: 6.18 M/UL (ref 3.93–5.22)
SODIUM SERPL-SCNC: 142 MEQ/L (ref 136–144)
SP GR UR REFRACT.AUTO: 1.02
TARGETS BLD QL SMEAR: ABNORMAL
TRIGL SERPL-MCNC: 60 MG/DL (ref 30–149)
UROBILINOGEN UR STRIP-ACNC: 0.2 EU/DL
WBC # BLD AUTO: 5.6 K/UL (ref 3.8–10.5)

## 2021-02-10 PROCEDURE — 82306 VITAMIN D 25 HYDROXY: CPT

## 2021-02-10 PROCEDURE — 36415 COLL VENOUS BLD VENIPUNCTURE: CPT

## 2021-02-10 PROCEDURE — 80053 COMPREHEN METABOLIC PANEL: CPT

## 2021-02-10 PROCEDURE — 81003 URINALYSIS AUTO W/O SCOPE: CPT

## 2021-02-10 PROCEDURE — 85025 COMPLETE CBC W/AUTO DIFF WBC: CPT

## 2021-02-10 PROCEDURE — 80061 LIPID PANEL: CPT

## 2021-03-03 NOTE — RESULT ENCOUNTER NOTE
Spoke with patient and Vitamin D low at 24, take 2 vitamin D supplements daily.  CBC stable, lytes good, UA normal.  Lipid panel is good.

## 2021-04-02 ENCOUNTER — OFFICE VISIT (OUTPATIENT)
Dept: HEMATOLOGY/ONCOLOGY | Facility: CLINIC | Age: 71
End: 2021-04-02
Attending: INTERNAL MEDICINE
Payer: MEDICARE

## 2021-04-02 VITALS
RESPIRATION RATE: 18 BRPM | BODY MASS INDEX: 25.51 KG/M2 | HEART RATE: 75 BPM | OXYGEN SATURATION: 99 % | WEIGHT: 130.6 LBS | SYSTOLIC BLOOD PRESSURE: 106 MMHG | TEMPERATURE: 97.5 F | DIASTOLIC BLOOD PRESSURE: 66 MMHG

## 2021-04-02 DIAGNOSIS — Z17.1 MALIGNANT NEOPLASM OF LEFT BREAST IN FEMALE, ESTROGEN RECEPTOR NEGATIVE, UNSPECIFIED SITE OF BREAST (CMS/HCC): ICD-10-CM

## 2021-04-02 DIAGNOSIS — C50.912 MALIGNANT NEOPLASM OF LEFT BREAST IN FEMALE, ESTROGEN RECEPTOR NEGATIVE, UNSPECIFIED SITE OF BREAST (CMS/HCC): ICD-10-CM

## 2021-04-02 PROCEDURE — 99214 OFFICE O/P EST MOD 30 MIN: CPT | Performed by: INTERNAL MEDICINE

## 2021-04-02 ASSESSMENT — ENCOUNTER SYMPTOMS
CONSTITUTIONAL NEGATIVE: 1
EYES NEGATIVE: 1
DIZZINESS: 1
ARTHRALGIAS: 1
GASTROINTESTINAL NEGATIVE: 1
RESPIRATORY NEGATIVE: 1
ENDOCRINE NEGATIVE: 1
HEMATOLOGIC/LYMPHATIC NEGATIVE: 1
BACK PAIN: 1
CARDIOVASCULAR NEGATIVE: 1

## 2021-04-02 ASSESSMENT — PAIN SCALES - GENERAL: PAINLEVEL: 0-NO PAIN

## 2021-04-02 NOTE — LETTER
April 2, 2021    Patient: Ruthy Kaminski   YOB: 1950   Date of Visit: 4/2/2021       Dear Dr. Lemus:    The patient is seen back at the [facility] IN [place] today in follow up with regard to her   1. Malignant neoplasm of left breast in female, estrogen receptor negative, unspecified site of breast (CMS/HCC)    . Attached is my assessment and plan of care.         Sincerely,        Marco Antonio Aguillon,     CC: No Recipients

## 2021-04-02 NOTE — ASSESSMENT & PLAN NOTE
Patient returns the [place] site of the cancer regarding history of breast cancer.  She had bilateral mastectomies for BRCA 1 history.  Based on today's history, physical exam and laboratory there is no evidence of any recurrent disease.  She will continue with yearly oncologic surveillance.

## 2021-04-02 NOTE — PROGRESS NOTES
Review of Systems   Constitutional: Negative.    HENT:  Negative.    Eyes: Negative.    Respiratory: Negative.    Cardiovascular: Negative.    Gastrointestinal: Negative.    Endocrine: Negative.    Genitourinary: Negative.     Musculoskeletal: Positive for arthralgias and back pain.   Skin:        Discomfort under left arm. States related to implant.   Neurological: Positive for dizziness.   Hematological: Negative.    Psychiatric/Behavioral: Sleep disturbance: relief with trazadone.

## 2021-04-02 NOTE — PROGRESS NOTES
Ruthy Kaminski is a 70 y.o. female, with history of metachronous breast cancers.  :  1950    Encounter Diagnosis   Name Primary?   • Malignant neoplasm of left breast in female, estrogen receptor negative, unspecified site of breast (CMS/HCC)        Cancer Staging  Malignant neoplasm of breast (CMS/HCC)  Staging form: Breast, AJCC 8th Edition  - Clinical: No stage assigned - Unsigned  - Pathologic stage from 2019: pT1c, pN0(sn), cM0, ER: Negative, NH: Negative, HER2: Negative - Signed by Marco Antonio Aguillon DO on 2019      Oncology History Overview Note     Diagnoses:  1. : Left breast cancer, infiltrating ductal ER/NH-negative, HER 2-negative, lymph node negative disease.  2. 2008: Bilateral ductal carcinoma in situ.  3. 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    Treatment History:  1. 10/21/05-06: Adjuvant dose dense Adriamycin, cyclophosphamide and paclitaxel.  2. 10/28/08: Bilateral mastectomy.  3. 06: Bilateral salpingo oophorectomy.         Malignant neoplasm of breast (CMS/HCC)   5/3/2018 Initial Diagnosis    Malignant neoplasm of breast (CMS/HCC) ([facility])     2019 Cancer Staged    Staging form: Breast, AJCC 8th Edition  - Pathologic stage from 2019: pT1c, pN0(sn), cM0, ER: Negative, NH: Negative, HER2: Negative         Patient Active Problem List   Diagnosis   • Malignant neoplasm of breast (CMS/HCC)   • Beta thalassemia trait   • Neck pain   • Baker's cyst of knee, left   • Insomnia   • Medicare annual wellness visit, subsequent   • Chronic low back pain       History of Present Illness  HPI  Ruthy Kaminski is seen today in follow up.  Patient returns to the [place] site of the cancer regarding her metachronous breast cancers in the setting of BRCA1 mutation.  She has had bilateral mastectomies.  Her only complaint is some tenderness and pulling in the lateral aspect of the left reconstruction area.  She has been somewhat reluctant to go back to plastic surgery we  talked about her going back here to evaluate this area.  She has had no fevers or night sweats.  Her appetite has been good.  She complains of no bony type discomfort.  She is able perform her activities of daily living.  She has noticed no change in her reconstructed breast with regard to masses or rash.    Review of Systems - Oncology  Review of Systems   Constitutional: Negative.    HENT:  Negative.    Eyes: Negative.    Respiratory: Negative.    Cardiovascular: Negative.    Gastrointestinal: Negative.    Endocrine: Negative.    Genitourinary: Negative.     Musculoskeletal: Positive for arthralgias and back pain.   Skin:        Discomfort under left arm. States related to implant.   Neurological: Positive for dizziness.   Hematological: Negative.    Psychiatric/Behavioral: Sleep disturbance: relief with trazadone.   Review of Systems:  Nursing assessment reviewed. Pertinent positive and negative symptoms noted in HPI, all others negative.      Temp:  [36.4 °C (97.5 °F)] 36.4 °C (97.5 °F)  Heart Rate:  [75] 75  Resp:  [18] 18  BP: (106)/(66) 106/66  Visit Vitals  /66   Pulse 75   Temp 36.4 °C (97.5 °F)   Resp 18   Wt 59.2 kg (130 lb 9.6 oz)   SpO2 99%   BMI 25.51 kg/m²     No data recorded  Physical Exam  Vitals signs and nursing note reviewed.   Constitutional:       Appearance: Normal appearance. She is well-developed. She is not diaphoretic.      Comments: Patient's ECOG performance status is 0   HENT:      Head: Normocephalic and atraumatic.   Eyes:      General: No scleral icterus.     Conjunctiva/sclera: Conjunctivae normal.   Neck:      Comments: No axillary adenopathy is noted  Cardiovascular:      Rate and Rhythm: Normal rate and regular rhythm.      Heart sounds: Normal heart sounds. No murmur.   Pulmonary:      Effort: Pulmonary effort is normal.      Breath sounds: Normal breath sounds. No wheezing or rales.   Chest:      Comments: Patient is had bilateral mastectomies with implants.  There are  no masses or skin changes to suggest new or recurrent disease.  Abdominal:      General: Bowel sounds are normal.      Palpations: Abdomen is soft. There is no mass.      Tenderness: There is no abdominal tenderness.   Lymphadenopathy:      Cervical: No cervical adenopathy.   Skin:     General: Skin is warm and dry.   Neurological:      Mental Status: She is alert and oriented to person, place, and time.   Psychiatric:         Behavior: Behavior normal.         Past Medical History:   Diagnosis Date   • Baker's cyst of knee, left 9/19/2018   • Beta thalassemia (CMS/HCC)    • Breast cancer (CMS/HCC)    • Osteoporosis        OB History   No obstetric history on file.     Gynecologic History:  Age at menarche: No age on file  Age at first live birth: No age on file   Age at menopause: No age on file    Past Surgical History:   Procedure Laterality Date   • EYE SURGERY     • HYSTERECTOMY     • MASTECTOMY         Social History     Tobacco Use   • Smoking status: Never Smoker   • Smokeless tobacco: Never Used   Substance Use Topics   • Alcohol use: Yes     Comment: socially   • Drug use: No       Family History   Problem Relation Age of Onset   • Breast cancer Biological Mother    • Thyroid cancer Biological Mother    • Polymyalgia rheumatica Biological Mother    • Cancer Biological Father    • Heart disease Paternal Grandmother    • Thyroid disease Paternal Grandfather    • Cancer Paternal Grandfather          Allergies  Keflex [cephalexin], Penicillin g, Cephalexin monohydrate, and Sulfa (sulfonamide antibiotics)    Medications    Current Outpatient Medications:   •  cholecalciferol, vitamin D3, (cholecalciferol) 1,000 unit (25 mcg) tablet, Take 1,000 Units by mouth daily., Disp: , Rfl:   •  meloxicam (MOBIC) 7.5 mg tablet, Take 1 tablet (7.5 mg total) by mouth daily., Disp: 30 tablet, Rfl: 0  •  pantoprazole (PROTONIX) 40 mg EC tablet, Take 40 mg by mouth once daily., Disp: , Rfl:   •  traZODone (DESYREL) 100 mg  tablet, Take 1 tablet (100 mg total) by mouth nightly., Disp: 90 tablet, Rfl: 3  •  cranberry 400 mg capsule, Take 2 capsules by mouth., Disp: , Rfl:   •  diclofenac sodium (VOLTAREN) 1 % topical gel, APPLY ONE INCH TO AFFECTED AREA UP TO 3 TIMES DAILY., Disp: , Rfl:      Laboratory   Ref Range & Units 2/10/21 0713     WBC 3.80 - 10.50 K/uL 5.60     RBC 3.93 - 5.22 M/uL 6.18High      Hemoglobin 11.8 - 15.7 g/dL 12.3     Hematocrit 35.0 - 45.0 % 40.8     MCV 83.0 - 98.0 fL 66.0Low      MCH 28.0 - 33.2 pg 19.9Low      MCHC 32.2 - 35.5 g/dL 30.1Low      RDW 11.7 - 14.4 % 18.4High      Platelets 150 - 369 K/uL 323     MPV 9.4 - 12.3 fL 9.5     Differential Type  Auto     nRBC <=0.0 % 0.0     Immature Granulocytes % 0.2     Neutrophils % 60.3     Lymphocytes % 29.5     Monocytes % 6.1     Eosinophils % 3.4     Basophils % 0.5     Immature Granulocytes, Absolute 0.00 - 0.08 K/uL 0.01        N.B. ; patient does have thalassemia trait.        Assessment and Plan  Malignant neoplasm of breast (CMS/HCC) (HCC)  Patient returns the [place] site of the cancer regarding history of breast cancer.  She had bilateral mastectomies for BRCA 1 history.  Based on today's history, physical exam and laboratory there is no evidence of any recurrent disease.  She will continue with yearly oncologic surveillance.      Marco Antonio Aguillon DO, MS  Medical Director, [facility]

## 2021-04-13 DIAGNOSIS — Z23 ENCOUNTER FOR IMMUNIZATION: ICD-10-CM

## 2021-10-21 ENCOUNTER — TELEPHONE (OUTPATIENT)
Dept: FAMILY MEDICINE | Facility: CLINIC | Age: 71
End: 2021-10-21

## 2021-10-21 NOTE — TELEPHONE ENCOUNTER
Patient called and the trazodone is putting her to sleep but not keeping her asleep so was wondering if she could take a higher dose. Also wondering if you could call in Macrobid for her for a uti

## 2021-10-22 RX ORDER — TRAZODONE HYDROCHLORIDE 100 MG/1
150 TABLET ORAL NIGHTLY
Qty: 90 TABLET | Refills: 3 | COMMUNITY
Start: 2021-10-22 | End: 2021-12-03 | Stop reason: ALTCHOICE

## 2021-10-22 RX ORDER — NITROFURANTOIN 25; 75 MG/1; MG/1
100 CAPSULE ORAL 2 TIMES DAILY
Qty: 14 CAPSULE | Refills: 0 | Status: SHIPPED | OUTPATIENT
Start: 2021-10-22 | End: 2021-10-29

## 2021-10-22 NOTE — TELEPHONE ENCOUNTER
She can increase the trazodone to 1 1/2 tablets at bedtime (150mg).  I will send in script for macrobid.

## 2021-12-03 RX ORDER — TRAZODONE HYDROCHLORIDE 150 MG/1
150 TABLET ORAL NIGHTLY
Qty: 90 TABLET | Refills: 3 | Status: SHIPPED | OUTPATIENT
Start: 2021-12-03 | End: 2022-06-17 | Stop reason: CLARIF

## 2022-02-17 ENCOUNTER — TRANSCRIBE ORDERS (OUTPATIENT)
Dept: SCHEDULING | Age: 72
End: 2022-02-17

## 2022-02-17 DIAGNOSIS — M16.11 UNILATERAL PRIMARY OSTEOARTHRITIS, RIGHT HIP: Primary | ICD-10-CM

## 2022-02-22 ENCOUNTER — HOSPITAL ENCOUNTER (OUTPATIENT)
Dept: RADIOLOGY | Facility: HOSPITAL | Age: 72
Discharge: HOME | End: 2022-02-22
Attending: FAMILY MEDICINE
Payer: MEDICARE

## 2022-02-22 DIAGNOSIS — M16.11 UNILATERAL PRIMARY OSTEOARTHRITIS, RIGHT HIP: ICD-10-CM

## 2022-03-23 ENCOUNTER — TELEPHONE (OUTPATIENT)
Dept: FAMILY MEDICINE | Facility: CLINIC | Age: 72
End: 2022-03-23
Payer: MEDICARE

## 2022-03-23 RX ORDER — NITROFURANTOIN 25; 75 MG/1; MG/1
100 CAPSULE ORAL 2 TIMES DAILY
Qty: 14 CAPSULE | Refills: 0 | Status: SHIPPED | OUTPATIENT
Start: 2022-03-23 | End: 2022-03-30

## 2022-03-23 NOTE — TELEPHONE ENCOUNTER
Spoke with patient. She is traveling and is requesting a prescription for Macrobid as she always gets UTIs when traveling. She would like to have it on hand just in case.

## 2022-06-03 ENCOUNTER — OFFICE VISIT (OUTPATIENT)
Dept: HEMATOLOGY/ONCOLOGY | Facility: CLINIC | Age: 72
End: 2022-06-03
Attending: INTERNAL MEDICINE
Payer: MEDICARE

## 2022-06-03 VITALS
RESPIRATION RATE: 17 BRPM | TEMPERATURE: 97.1 F | WEIGHT: 130 LBS | DIASTOLIC BLOOD PRESSURE: 67 MMHG | OXYGEN SATURATION: 94 % | HEART RATE: 70 BPM | BODY MASS INDEX: 25.39 KG/M2 | SYSTOLIC BLOOD PRESSURE: 146 MMHG

## 2022-06-03 DIAGNOSIS — C50.912 MALIGNANT NEOPLASM OF LEFT BREAST IN FEMALE, ESTROGEN RECEPTOR NEGATIVE, UNSPECIFIED SITE OF BREAST (CMS/HCC): ICD-10-CM

## 2022-06-03 DIAGNOSIS — Z17.1 MALIGNANT NEOPLASM OF LEFT BREAST IN FEMALE, ESTROGEN RECEPTOR NEGATIVE, UNSPECIFIED SITE OF BREAST (CMS/HCC): ICD-10-CM

## 2022-06-03 PROCEDURE — 99213 OFFICE O/P EST LOW 20 MIN: CPT | Performed by: INTERNAL MEDICINE

## 2022-06-03 RX ORDER — OMEPRAZOLE 40 MG/1
CAPSULE, DELAYED RELEASE ORAL
COMMUNITY
End: 2022-06-03 | Stop reason: ALTCHOICE

## 2022-06-03 ASSESSMENT — ENCOUNTER SYMPTOMS
NEUROLOGICAL NEGATIVE: 1
CARDIOVASCULAR NEGATIVE: 1
ABDOMINAL PAIN: 0
CONSTITUTIONAL NEGATIVE: 1
COUGH: 0
MUSCULOSKELETAL NEGATIVE: 1
FEVER: 0
HEMATURIA: 0
HEMOPTYSIS: 0
SHORTNESS OF BREATH: 0
CONSTIPATION: 0
GASTROINTESTINAL NEGATIVE: 1
HEMATOLOGIC/LYMPHATIC NEGATIVE: 1
VOMITING: 0
FATIGUE: 0
LEG SWELLING: 0
CHEST TIGHTNESS: 0
EYES NEGATIVE: 1
DYSURIA: 0
PSYCHIATRIC NEGATIVE: 1
NERVOUS/ANXIOUS: 0
DIARRHEA: 0
ADENOPATHY: 0
VOICE CHANGE: 0
NAUSEA: 0
ENDOCRINE NEGATIVE: 1
BACK PAIN: 0
RESPIRATORY NEGATIVE: 1
DIZZINESS: 0
APPETITE CHANGE: 0

## 2022-06-03 ASSESSMENT — PAIN SCALES - GENERAL: PAINLEVEL: 0-NO PAIN

## 2022-06-03 NOTE — PROGRESS NOTES
Ruthy Kaminski is a 71 y.o. female, with history of breast cancers in the background of BRCA1 mutation  :  1950    Encounter Diagnosis   Name Primary?   • Malignant neoplasm of left breast in female, estrogen receptor negative, unspecified site of breast (CMS/HCC)        Cancer Staging  Malignant neoplasm of breast (CMS/HCC)  Staging form: Breast, AJCC 8th Edition  - Clinical: No stage assigned - Unsigned  - Pathologic stage from 2019: pT1c, pN0(sn), cM0, ER: Negative, LA: Negative, HER2: Negative - Signed by Marco Antonio Aguillon DO on 2019      Oncology History Overview Note     Diagnoses:  1. : Left breast cancer, infiltrating ductal ER/LA-negative, HER 2-negative, lymph node negative disease.  2. 2008: Bilateral ductal carcinoma in situ.  3. 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    Treatment History:  1. 10/21/05-06: Adjuvant dose dense Adriamycin, cyclophosphamide and paclitaxel.  2. 10/28/08: Bilateral mastectomy.  3. 06: Bilateral salpingo oophorectomy.         Malignant neoplasm of breast (CMS/HCC)   5/3/2018 Initial Diagnosis    Malignant neoplasm of breast (CMS/HCC) ([facility])     2019 Cancer Staged    Staging form: Breast, AJCC 8th Edition  - Pathologic stage from 2019: pT1c, pN0(sn), cM0, ER: Negative, LA: Negative, HER2: Negative         Patient Active Problem List   Diagnosis   • Malignant neoplasm of breast (CMS/HCC)   • Beta thalassemia trait   • Neck pain   • Baker's cyst of knee, left   • Insomnia   • Medicare annual wellness visit, subsequent   • Chronic low back pain       History of Present Illness  HPI  Ruthy Kaminski is seen today in follow up.    Patient returns to the [facility] site of the cancer center regarding her breast cancer.  Since her visit about a year ago clinically she has been doing well.  Her only complaint is of some discomfort upon occasion in the lateral aspect of the left breast onto the chest wall.  She has seen plastic surgery who  thinks this is from scar tissue and she is contemplating a surgery for this.  Her appetite has been good.  She has no chronic back pain.  She is noticed no masses.  She describes no dyspnea, cough hemoptysis.  She has no abdominal pain, nausea or emesis.  Review of Systems   Constitutional: Negative for appetite change, fatigue and fever.   HENT:   Negative for mouth sores and voice change.    Respiratory: Negative for chest tightness, cough, hemoptysis and shortness of breath.    Cardiovascular: Negative for chest pain and leg swelling.   Gastrointestinal: Negative for abdominal pain, constipation, diarrhea, nausea and vomiting.   Genitourinary: Negative for dysuria and hematuria.    Musculoskeletal: Negative for back pain.        Occasional left lateral chest wall and breast discomfort with motion.   Neurological: Negative for dizziness.   Hematological: Negative for adenopathy.   Psychiatric/Behavioral: The patient is not nervous/anxious.        Review of Systems:  Nursing assessment reviewed. Pertinent positive and negative symptoms noted in HPI, all others negative.      Temp:  [36.2 °C (97.1 °F)] 36.2 °C (97.1 °F)  Heart Rate:  [70] 70  Resp:  [17] 17  BP: (146)/(67) 146/67  Visit Vitals  BP (!) 146/67 (BP Location: Left upper arm, Patient Position: Sitting)   Pulse 70   Temp 36.2 °C (97.1 °F) (Temporal)   Resp 17   Wt 59 kg (130 lb)   SpO2 94% Comment: on RA   BMI 25.39 kg/m²     No data recorded  Physical Exam  Vitals and nursing note reviewed.   Constitutional:       Appearance: She is well-developed and normal weight. She is not diaphoretic.      Comments: The patient's ECOG performance status is 0.   HENT:      Head: Normocephalic and atraumatic.   Eyes:      General: No scleral icterus.     Conjunctiva/sclera: Conjunctivae normal.   Neck:      Comments: No axillary adenopathy is noted.  Cardiovascular:      Rate and Rhythm: Normal rate and regular rhythm.      Heart sounds: Normal heart sounds. No murmur  heard.  Pulmonary:      Effort: Pulmonary effort is normal. No respiratory distress.      Breath sounds: Normal breath sounds. No wheezing, rhonchi or rales.   Abdominal:      General: Bowel sounds are normal.      Palpations: Abdomen is soft. There is no mass.      Tenderness: There is no abdominal tenderness.   Musculoskeletal:      Comments: There is no tenderness percussion of cervical, thoracic or lumbar spines.  I cannot reproduce her discomfort by palpating the lateral left chest wall.   Lymphadenopathy:      Cervical: No cervical adenopathy.   Skin:     General: Skin is warm and dry.   Neurological:      Mental Status: She is alert and oriented to person, place, and time.   Psychiatric:         Behavior: Behavior normal.         Past Medical History:   Diagnosis Date   • Baker's cyst of knee, left 9/19/2018   • Beta thalassemia (CMS/HCC)    • Breast cancer (CMS/HCC)    • Osteoporosis        OB History   No obstetric history on file.     Gynecologic History:  Age at menarche: No age on file  Age at first live birth: No age on file   Age at menopause: No age on file    Past Surgical History:   Procedure Laterality Date   • EYE SURGERY     • HYSTERECTOMY     • MASTECTOMY         Social History     Tobacco Use   • Smoking status: Never Smoker   • Smokeless tobacco: Never Used   Substance Use Topics   • Alcohol use: Yes     Comment: socially   • Drug use: No       Family History   Problem Relation Age of Onset   • Breast cancer Biological Mother    • Thyroid cancer Biological Mother    • Polymyalgia rheumatica Biological Mother    • Cancer Biological Father    • Heart disease Paternal Grandmother    • Thyroid disease Paternal Grandfather    • Cancer Paternal Grandfather          Allergies  Penicillin g, Cephalexin monohydrate, and Sulfa (sulfonamide antibiotics)    Medications    Current Outpatient Medications:   •  cholecalciferol, vitamin D3, (cholecalciferol) 1,000 unit (25 mcg) tablet, Take 1,000 Units by  mouth daily., Disp: , Rfl:   •  pantoprazole (PROTONIX) 40 mg EC tablet, Take 40 mg by mouth once daily., Disp: , Rfl:   •  ranitidine (ZANTAC) 300 mg capsule, Take by mouth., Disp: , Rfl:   •  traZODone 150 mg tablet, Take 1 tablet (150 mg total) by mouth nightly., Disp: 90 tablet, Rfl: 3     Laboratory    No results found for this or any previous visit (from the past 504 hour(s)).      Radiology    No results found.    Assessment and Plan  Malignant neoplasm of breast (CMS/HCC) (HCC)  Patient is seen at the [place] site of the [facility] regarding her BRCA1 associated breast cancer.  She has had bilateral mastectomies as well as bilateral salpingo-oophorectomies.  Based on today's history physical exam is no evidence of any recurrent disease.  She will continue with oncologic surveillance with return visit in 1 year.      Marco Antonio Aguillon DO, MS  Medical Director, [facility]

## 2022-06-03 NOTE — PROGRESS NOTES
Review of Systems   Constitutional: Negative.    HENT:  Negative.    Eyes: Negative.    Respiratory: Negative.    Cardiovascular: Negative.    Gastrointestinal: Negative.    Endocrine: Negative.    Genitourinary: Negative.     Musculoskeletal: Negative.    Skin: Negative.    Neurological: Negative.    Hematological: Negative.    Psychiatric/Behavioral: Negative.

## 2022-06-03 NOTE — ASSESSMENT & PLAN NOTE
Patient is seen at the [place] site of the [facility] regarding her BRCA1 associated breast cancer.  She has had bilateral mastectomies as well as bilateral salpingo-oophorectomies.  Based on today's history physical exam is no evidence of any recurrent disease.  She will continue with oncologic surveillance with return visit in 1 year.

## 2022-06-03 NOTE — LETTER
Pura 3, 2022    Patient: Ruthy Kaminski   YOB: 1950   Date of Visit: 6/3/2022       Dear Dr. Lemus:    The patient is seen back at the [facility] [place] today in follow up with regard to her   1. Malignant neoplasm of left breast in female, estrogen receptor negative, unspecified site of breast (CMS/HCC)    . Attached is my assessment and plan of care.         Sincerely,        Marco Antonio Aguillon,     CC: No Recipients

## 2022-06-17 RX ORDER — TRAZODONE HYDROCHLORIDE 100 MG/1
100 TABLET ORAL NIGHTLY
Qty: 90 TABLET | Refills: 3 | Status: SHIPPED | OUTPATIENT
Start: 2022-06-17 | End: 2023-02-22 | Stop reason: ALTCHOICE

## 2022-06-17 RX ORDER — TRAZODONE HYDROCHLORIDE 50 MG/1
50 TABLET ORAL NIGHTLY
Qty: 90 TABLET | Refills: 3 | Status: SHIPPED | OUTPATIENT
Start: 2022-06-17 | End: 2023-02-22 | Stop reason: ALTCHOICE

## 2022-07-14 ENCOUNTER — CONSULT (OUTPATIENT)
Dept: FAMILY MEDICINE | Facility: CLINIC | Age: 72
End: 2022-07-14
Payer: MEDICARE

## 2022-07-14 VITALS
WEIGHT: 135 LBS | SYSTOLIC BLOOD PRESSURE: 138 MMHG | DIASTOLIC BLOOD PRESSURE: 78 MMHG | TEMPERATURE: 97 F | BODY MASS INDEX: 26.5 KG/M2 | HEIGHT: 60 IN | HEART RATE: 87 BPM | RESPIRATION RATE: 16 BRPM

## 2022-07-14 DIAGNOSIS — H02.403 PTOSIS OF BOTH EYELIDS: Primary | ICD-10-CM

## 2022-07-14 DIAGNOSIS — C50.912 MALIGNANT NEOPLASM OF LEFT BREAST IN FEMALE, ESTROGEN RECEPTOR NEGATIVE, UNSPECIFIED SITE OF BREAST (CMS/HCC): ICD-10-CM

## 2022-07-14 DIAGNOSIS — F51.04 PSYCHOPHYSIOLOGICAL INSOMNIA: ICD-10-CM

## 2022-07-14 DIAGNOSIS — D56.3 BETA THALASSEMIA TRAIT: ICD-10-CM

## 2022-07-14 DIAGNOSIS — Z17.1 MALIGNANT NEOPLASM OF LEFT BREAST IN FEMALE, ESTROGEN RECEPTOR NEGATIVE, UNSPECIFIED SITE OF BREAST (CMS/HCC): ICD-10-CM

## 2022-07-14 PROBLEM — Z00.00 MEDICARE ANNUAL WELLNESS VISIT, SUBSEQUENT: Status: RESOLVED | Noted: 2019-03-05 | Resolved: 2022-07-14

## 2022-07-14 PROCEDURE — 93000 ELECTROCARDIOGRAM COMPLETE: CPT | Performed by: FAMILY MEDICINE

## 2022-07-14 PROCEDURE — 99214 OFFICE O/P EST MOD 30 MIN: CPT | Performed by: FAMILY MEDICINE

## 2022-07-14 NOTE — PROGRESS NOTES
Subjective      Patient ID: Ruthy Kaminski is a 71 y.o. female.    HPI patient is here for preop clearance for ptosis of the eyelids.  This is affecting her vision at times.  Patient has a history of breast cancer beta thalassemia, insomnia.  Chronic conditions are stable.  Patient denies any chest pain or shortness of breath or any exertional chest pain or shortness of breath.  She has had no recent illnesses fevers, coughs or colds.     Tobacco  Allergies  Meds  Problems  Med Hx  Surg Hx  Fam Hx         Review of Systems   All other systems reviewed and are negative.    Allergies   Allergen Reactions   • Penicillin G    • Cephalexin Monohydrate Rash     KEFLEX   • Sulfa (Sulfonamide Antibiotics) Rash     Current Outpatient Medications   Medication Sig Dispense Refill   • cholecalciferol, vitamin D3, (cholecalciferol) 1,000 unit (25 mcg) tablet Take 1,000 Units by mouth daily.     • pantoprazole (PROTONIX) 40 mg EC tablet Take 40 mg by mouth once daily.     • ranitidine (ZANTAC) 300 mg capsule Take by mouth.     • traZODone (DESYREL) 100 mg tablet Take 1 tablet (100 mg total) by mouth nightly. 90 tablet 3   • traZODone (DESYREL) 50 mg tablet Take 1 tablet (50 mg total) by mouth nightly. 90 tablet 3     No current facility-administered medications for this visit.     Past Medical History:   Diagnosis Date   • Baker's cyst of knee, left 9/19/2018   • Beta thalassemia (CMS/HCC)    • Breast cancer (CMS/HCC)    • Osteoporosis      Past Surgical History:   Procedure Laterality Date   • EYE SURGERY     • HYSTERECTOMY     • MASTECTOMY       Social History     Tobacco Use   • Smoking status: Never Smoker   • Smokeless tobacco: Never Used   Substance Use Topics   • Alcohol use: Yes     Comment: socially   • Drug use: No     Family History   Problem Relation Age of Onset   • Breast cancer Biological Mother    • Thyroid cancer Biological Mother    • Polymyalgia rheumatica Biological Mother    • Cancer Biological Father     • Heart disease Paternal Grandmother    • Thyroid disease Paternal Grandfather    • Cancer Paternal Grandfather        Objective   Vitals:    07/14/22 1024   BP: 138/78   Pulse: 87   Resp: 16   Temp: 36.1 °C (97 °F)   Weight: 61.2 kg (135 lb)   Height: 1.524 m (5')    Body mass index is 26.37 kg/m².  Physical Exam  Constitutional:       General: She is not in acute distress.     Appearance: She is well-developed. She is not toxic-appearing.   HENT:      Head: Normocephalic.      Right Ear: Tympanic membrane, ear canal and external ear normal.      Left Ear: Tympanic membrane, ear canal and external ear normal.      Nose: Nose normal.   Eyes:      General: Lids are normal.      Conjunctiva/sclera: Conjunctivae normal.   Neck:      Thyroid: No thyroid mass or thyromegaly.   Cardiovascular:      Rate and Rhythm: Normal rate and regular rhythm.      Heart sounds: Normal heart sounds. No murmur heard.  Pulmonary:      Effort: Pulmonary effort is normal. No respiratory distress.      Breath sounds: Normal breath sounds.   Musculoskeletal:      Cervical back: Neck supple.   Lymphadenopathy:      Head:      Right side of head: No submental, submandibular, preauricular, posterior auricular or occipital adenopathy.      Left side of head: No submental, submandibular, preauricular, posterior auricular or occipital adenopathy.      Cervical: No cervical adenopathy.   Psychiatric:      Comments: Alert and oriented         Assessment/Plan   Problem List Items Addressed This Visit     Malignant neoplasm of breast (CMS/HCC)    Beta thalassemia trait    Insomnia      Other Visit Diagnoses     Ptosis of both eyelids    -  Primary    Relevant Orders    ECG 12 lead (Completed)      Chronic conditions are stable, patient may proceed with surgery as planned.  New current medications.  EKG today reveals normal sinus rhythm with no acute ST-T changes.    Patient agrees and verbalizes understanding of medication and treatment plan  discussed at today's visit.  Patient was instructed to call or follow-up if symptoms persist or worsen.         Jayme Donald, DO    This note was created with voice recognition software. Inadvertent dictation errors should be disregarded. Please contact my office for clarification.

## 2022-09-06 ENCOUNTER — TRANSCRIBE ORDERS (OUTPATIENT)
Dept: LAB | Age: 72
End: 2022-09-06

## 2022-09-06 ENCOUNTER — APPOINTMENT (OUTPATIENT)
Dept: LAB | Age: 72
End: 2022-09-06
Attending: INTERNAL MEDICINE
Payer: MEDICARE

## 2022-09-06 DIAGNOSIS — R06.00 DYSPNEA, UNSPECIFIED: Primary | ICD-10-CM

## 2022-09-06 DIAGNOSIS — R42 DIZZINESS AND GIDDINESS: ICD-10-CM

## 2022-09-06 DIAGNOSIS — R03.0 ELEVATED BLOOD-PRESSURE READING, WITHOUT DIAGNOSIS OF HYPERTENSION: ICD-10-CM

## 2022-09-06 DIAGNOSIS — R06.00 DYSPNEA, UNSPECIFIED: ICD-10-CM

## 2022-09-06 LAB
ALBUMIN SERPL-MCNC: 4 G/DL (ref 3.4–5)
ALP SERPL-CCNC: 65 IU/L (ref 35–126)
ALT SERPL-CCNC: 9 IU/L (ref 11–54)
ANION GAP SERPL CALC-SCNC: 5 MEQ/L (ref 3–15)
ANISOCYTOSIS BLD QL SMEAR: ABNORMAL
AST SERPL-CCNC: 14 IU/L (ref 15–41)
BASOPHILS # BLD: 0.03 K/UL (ref 0.01–0.1)
BASOPHILS NFR BLD: 0.6 %
BILIRUB SERPL-MCNC: 0.6 MG/DL (ref 0.3–1.2)
BUN SERPL-MCNC: 16 MG/DL (ref 8–20)
CALCIUM SERPL-MCNC: 9.5 MG/DL (ref 8.9–10.3)
CHLORIDE SERPL-SCNC: 105 MEQ/L (ref 98–109)
CHOLEST SERPL-MCNC: 203 MG/DL
CO2 SERPL-SCNC: 28 MEQ/L (ref 22–32)
CREAT SERPL-MCNC: 0.8 MG/DL (ref 0.6–1.1)
DIFFERENTIAL METHOD BLD: ABNORMAL
EOSINOPHIL # BLD: 0.15 K/UL (ref 0.04–0.36)
EOSINOPHIL NFR BLD: 2.8 %
ERYTHROCYTE [DISTWIDTH] IN BLOOD BY AUTOMATED COUNT: 18.1 % (ref 11.7–14.4)
GFR SERPL CREATININE-BSD FRML MDRD: >60 ML/MIN/1.73M*2
GLUCOSE SERPL-MCNC: 92 MG/DL (ref 70–99)
HCT VFR BLDCO AUTO: 40.2 % (ref 35–45)
HDLC SERPL-MCNC: 70 MG/DL
HDLC SERPL: 2.9 {RATIO}
HGB BLD-MCNC: 12.1 G/DL (ref 11.8–15.7)
HYPOCHROMIA BLD QL SMEAR: ABNORMAL
IMM GRANULOCYTES # BLD AUTO: 0.02 K/UL (ref 0–0.08)
IMM GRANULOCYTES NFR BLD AUTO: 0.4 %
LDLC SERPL CALC-MCNC: 116 MG/DL
LYMPHOCYTES # BLD: 1.79 K/UL (ref 1.2–3.5)
LYMPHOCYTES NFR BLD: 34 %
MCH RBC QN AUTO: 19.9 PG (ref 28–33.2)
MCHC RBC AUTO-ENTMCNC: 30.1 G/DL (ref 32.2–35.5)
MCV RBC AUTO: 66.2 FL (ref 83–98)
MICROCYTES BLD QL SMEAR: ABNORMAL
MONOCYTES # BLD: 0.35 K/UL (ref 0.28–0.8)
MONOCYTES NFR BLD: 6.6 %
NEUTROPHILS # BLD: 2.93 K/UL (ref 1.7–7)
NEUTS SEG NFR BLD: 55.6 %
NONHDLC SERPL-MCNC: 133 MG/DL
NRBC BLD-RTO: 0 %
OVALOCYTES BLD QL SMEAR: ABNORMAL
PDW BLD AUTO: 9.5 FL (ref 9.4–12.3)
PLAT MORPH BLD: NORMAL
PLATELET # BLD AUTO: 308 K/UL (ref 150–369)
PLATELET # BLD EST: ABNORMAL 10*3/UL
POTASSIUM SERPL-SCNC: 4.6 MEQ/L (ref 3.6–5.1)
PROT SERPL-MCNC: 6.6 G/DL (ref 6–8.2)
RBC # BLD AUTO: 6.07 M/UL (ref 3.93–5.22)
SODIUM SERPL-SCNC: 138 MEQ/L (ref 136–144)
TRIGL SERPL-MCNC: 83 MG/DL (ref 30–149)
WBC # BLD AUTO: 5.27 K/UL (ref 3.8–10.5)

## 2022-09-06 PROCEDURE — 85025 COMPLETE CBC W/AUTO DIFF WBC: CPT

## 2022-09-06 PROCEDURE — 80061 LIPID PANEL: CPT

## 2022-09-06 PROCEDURE — 83880 ASSAY OF NATRIURETIC PEPTIDE: CPT

## 2022-09-06 PROCEDURE — 36415 COLL VENOUS BLD VENIPUNCTURE: CPT

## 2022-09-06 PROCEDURE — 80053 COMPREHEN METABOLIC PANEL: CPT

## 2022-09-07 LAB — BNP SERPL-MCNC: 68 PG/ML

## 2022-09-12 ENCOUNTER — TRANSCRIBE ORDERS (OUTPATIENT)
Dept: SCHEDULING | Age: 72
End: 2022-09-12

## 2022-09-12 DIAGNOSIS — Z11.59 ENCOUNTER FOR SCREENING FOR OTHER VIRAL DISEASES: Primary | ICD-10-CM

## 2022-09-14 ENCOUNTER — OFFICE VISIT (OUTPATIENT)
Dept: FAMILY MEDICINE | Facility: CLINIC | Age: 72
End: 2022-09-14
Payer: MEDICARE

## 2022-09-14 VITALS
SYSTOLIC BLOOD PRESSURE: 120 MMHG | BODY MASS INDEX: 25.52 KG/M2 | TEMPERATURE: 98.1 F | RESPIRATION RATE: 13 BRPM | HEART RATE: 82 BPM | DIASTOLIC BLOOD PRESSURE: 80 MMHG | WEIGHT: 130 LBS | HEIGHT: 60 IN

## 2022-09-14 DIAGNOSIS — C50.912 MALIGNANT NEOPLASM OF LEFT BREAST IN FEMALE, ESTROGEN RECEPTOR NEGATIVE, UNSPECIFIED SITE OF BREAST (CMS/HCC): ICD-10-CM

## 2022-09-14 DIAGNOSIS — R26.89 BALANCE DISORDER: ICD-10-CM

## 2022-09-14 DIAGNOSIS — Z13.820 SCREENING FOR OSTEOPOROSIS: ICD-10-CM

## 2022-09-14 DIAGNOSIS — Z00.00 MEDICARE ANNUAL WELLNESS VISIT, SUBSEQUENT: Primary | ICD-10-CM

## 2022-09-14 DIAGNOSIS — Z17.1 MALIGNANT NEOPLASM OF LEFT BREAST IN FEMALE, ESTROGEN RECEPTOR NEGATIVE, UNSPECIFIED SITE OF BREAST (CMS/HCC): ICD-10-CM

## 2022-09-14 DIAGNOSIS — D56.3 BETA THALASSEMIA TRAIT: ICD-10-CM

## 2022-09-14 DIAGNOSIS — R42 DIZZINESS: ICD-10-CM

## 2022-09-14 DIAGNOSIS — Z78.0 POST-MENOPAUSAL: ICD-10-CM

## 2022-09-14 DIAGNOSIS — R53.83 FATIGUE, UNSPECIFIED TYPE: ICD-10-CM

## 2022-09-14 PROCEDURE — 84443 ASSAY THYROID STIM HORMONE: CPT | Performed by: FAMILY MEDICINE

## 2022-09-14 PROCEDURE — G0439 PPPS, SUBSEQ VISIT: HCPCS | Performed by: FAMILY MEDICINE

## 2022-09-14 RX ORDER — NITROFURANTOIN 25; 75 MG/1; MG/1
100 CAPSULE ORAL 2 TIMES DAILY
Qty: 14 CAPSULE | Refills: 3 | Status: ON HOLD | OUTPATIENT
Start: 2022-09-14 | End: 2022-09-19

## 2022-09-14 ASSESSMENT — PATIENT HEALTH QUESTIONNAIRE - PHQ9: SUM OF ALL RESPONSES TO PHQ9 QUESTIONS 1 & 2: 0

## 2022-09-14 NOTE — PATIENT INSTRUCTIONS
Your Personalized Prevention Plan Services (PPPS)    Preventive Services Checklist (Assumes Average Risk Unless Otherwise Noted):    Health Maintenance Topics with due status: Overdue       Topic Date Due    Breast Cancer Screening Never done    DTaP, Tdap, and Td Vaccines Never done    COVID-19 Vaccine 04/02/2021    DEXA Scan 10/24/2021    Medicare Annual Wellness Visit 11/12/2021    Influenza Vaccine 08/01/2022     Health Maintenance Topics with due status: Not Due       Topic Last Completion Date    Colorectal Cancer Screening 03/01/2013     Health Maintenance Topics with due status: Completed       Topic Last Completion Date    Hepatitis C Screening 06/03/2016    Pneumococcal (65 years and older) 06/03/2016    Zoster Vaccine 10/22/2020    Annual Falls Risk Screening 09/13/2022     Health Maintenance Topics with due status: Aged Out       Topic Date Due    Meningococcal ACWY Aged Out    HIB Vaccines Aged Out    IPV Vaccines Aged Out    HPV Vaccines Aged Out       You May Be Eligible for These Additional Preventive Services   (Assumes Average Risk Unless Otherwise Noted)  Diabetes Screening Any 1 risk factor: hypertension, dyslipidemia, obesity, high glucose; or Any 2 risk factors: >=64yo, overweight, family history diabetes (covered every 6 months)   Hepatitis C Screening Any 1 risk factor: 1) blood transfusion before 1992,   2) current or past injection drug use (annually for high risk; if born between 2079-3939, see above for status).   Vaccine: Hepatitis B As necessary if at-risk: hemophilia, ESRD, diabetes, living with individual infected with hep B, healthcare worker with frequent contact with blood/bodily fluids (series covered once)   Sexually Transmitted Diseases (STDs) As necessary chlamydia, gonorrhea, syphilis, hepatitis B (covered annually)  HIV if any 1 risk factor present: 1) <14yo or >64yo and at increased risk or 2) 15-64yo and ask for it (covered annually)   Lung Cancer  Screening Low dose chest CT if all three risk factors: 1) 50-78yo, 2) smoker or quit within last 15y, 3) >=20 pack years (covered annually).  No results found for this or any previous visit.       Cholesterol Screening Both risk factors: 1) >=19yo and 2)  increased risk coronary artery disease (covered every 5 years).     Breast Cancer Screening Covered once 35-40yo, annually >=41yo (if >=51yo, see above for status).         Health Risk Factors with Personalized Education:  ----------------------------------------------------------------------------------------------------------------------  Controlling Your Blood Pressure  · Maintain a normal weight (body mass index between 18.5 and 24.9).  · Eat more fruit, vegetables and low-fat dairy.  · Eat less saturated fat and total fat.  · Lower your sodium (salt) intake.  Try to stay under 1500 mg per day, but if you cannot get your intake to be that low, at least lower it by 1000 mg.  · Stay active.  Try to get at least 90 to 150 minutes of exercise per week.  Try brisk walking, swimming, bicycling or dancing.  · Limit alcohol intake.  When you do consume alcohol, drink no more than 1 drink per day.  · If you have been prescribed medication, take it regularly and exactly as prescribed.  Let your PCP know if you have any problems or questions about your medication.  · Check your blood pressure at home or at the store.  Write down your readings and share them with your PCP  ----------------------------------------------------------------------------------------------------------------------  Controlling Your Cholesterol  · Reduce the amount of saturated and trans fat in your diet.  Limit intake of red meat.  Consume only low-fat or non-fat/skim dairy.  Limit fried food.  Cook with vegetable oils.  · Reduce your intake of sugary foods, sugary drinks and alcohol.  · Eat a diet high in fruit, vegetables and whole grains.  · Get protein from fish, poultry and a small portion of  nuts.  · Stay active.  Try to get at least 90 to 150 minutes of exercise per week.  Try brisk walking, swimming, bicycling or dancing.  · Maintain a healthy weight by balancing your diet and exercise.  · If you have been prescribed medication, take it regularly and exactly as prescribed. Let your PCP know if you have any problems or questions about your medication.  · Its important to know your cholesterol numbers.  When recommended by your PCP, get the cholesterol blood test.  ----------------------------------------------------------------------------------------------------------------------  Controlling Your Osteopenia, Strengthening Your Bones  · Try to get at least 90 to 150 minutes of weight-bearing exercise per week.  · Ensure intake of at least 1200mg of calcium per day.  Eat foods high in calcium like milk and other dairy, green vegetables, fruit, canned fish with soft and edible bones, nuts, calcium-set tofu.  Some foods are calcium-fortified, like bread, cereal, fruit juices and mineral water.  · Help your body make vitamin D by getting 10-15 minutes per day of sunlight.    · Ensure intake of at least 600IU of vitamin D per day.  Eat foods high in vitamin D like oily fish (salmon, sardines, mackerel) and eggs.  Some foods are fortified with vitamin D, like dairy and cereals.  · Avoid high amounts of caffeine and salt, since they can cause the body to loose calcium.  · Limit alcohol intake, since it is associated with weaker bones and is associated with falls and fractures.  · Limit intake of fizzy drinks.  · If you have been prescribed medication, take it regularly and exactly as prescribed.  Let your PCP know if you have any problems or questions about your medication  ----------------------------------------------------------------------------------------------------------------------  Reducing Your Risk of Falls  · Tell your PCP if any of your medications make you feel tired, dizzy, lightheaded or  off-balance.  · Maintain coordination, flexibility and balance by ensuring regular physical activity.  · Limit alcohol intake to 1 drink per day.  Consider avoiding all alcohol intake.  · Ensure good vision.  Visit an ophthalmologist or optometrist regularly for vision screening or to make sure your glasses / contact lens prescription is correct.  If you need glasses or contacts, wear them.  When you get new glasses or contacts, take time to get used to them.  Do not wear sunglasses or tinted lenses when indoors.  · Ensure good hearing.  Have your hearing checked if you are having trouble hearing, or family and friends think you cannot hear them.  If you need a hearing aid, be sure it fits well and wear it.  · Get enough rest.  Ensure about 7-9 hours of sleep every day.  · Get up slowly from your bed or chairs.  Do not start walking until you are sure you feel steady.  · Wear non-skid, rubber-soled, low-heeled shoes.  Do not walk in socks, or in shoes and slippers with smooth soles.  · If your PCP or therapist recommends using a cane or walker, use it regularly.  · Make your home safer.  Increase lighting throughout the house, especially at the top and bottom of stairs.  Ensure lighting is easily turned on when getting up in the middle of the night.  Make sure there are two secure rails on all stairs.  Install grab bars in the bathtub / shower and near the toilet.  Consider using a shower chair and / or a hand-held shower.  · Spread sand or salt on icy surfaces.  Beware of wet surfaces, which can be icy.  · Tell your PCP if you have fallen.

## 2022-09-14 NOTE — PROGRESS NOTES
Subjective Patient is here for wellness exam.  Her only complaint is continued issues with dizziness, usually lasting just a few seconds and usually related to positional changes.  She was seen by ENT and told it was not vestibular in nature.    Ruthy Kaminski is a 72 y.o. female who presents for a subsequent annual wellness visit.     Patient Care Team:  Jayme Donald DO as PCP - General (Family Medicine)  Marco Antonio Aguillon DO as Consulting Physician (Hematology and Oncology)  Sydni Bernardo, [facility]    Comprehensive Medical and Social History  Patient Active Problem List   Diagnosis    Malignant neoplasm of breast (CMS/HCC)    Beta thalassemia trait    Neck pain    Baker's cyst of knee, left    Insomnia    Chronic low back pain     Past Medical History:   Diagnosis Date    Baker's cyst of knee, left 9/19/2018    Beta thalassemia (CMS/HCC)     Breast cancer (CMS/HCC)     Osteoporosis      Past Surgical History:   Procedure Laterality Date    EYE SURGERY      HYSTERECTOMY      MASTECTOMY       Allergies   Allergen Reactions    Penicillin G     Cephalexin Monohydrate Rash     KEFLEX    Sulfa (Sulfonamide Antibiotics) Rash     Current Outpatient Medications   Medication Sig Dispense Refill    nitrofurantoin, macrocrystal-monohydrate, (MACROBID) 100 mg capsule Take 1 capsule (100 mg total) by mouth 2 (two) times a day for 7 days. 14 capsule 3    cholecalciferol, vitamin D3, (cholecalciferol) 1,000 unit (25 mcg) tablet Take 1,000 Units by mouth daily.      pantoprazole (PROTONIX) 40 mg EC tablet Take 40 mg by mouth once daily.      ranitidine (ZANTAC) 300 mg capsule Take by mouth.      traZODone (DESYREL) 100 mg tablet Take 1 tablet (100 mg total) by mouth nightly. 90 tablet 3    traZODone (DESYREL) 50 mg tablet Take 1 tablet (50 mg total) by mouth nightly. 90 tablet 3     No current facility-administered medications for this visit.     Social History     Tobacco Use    Smoking status:  Never Smoker    Smokeless tobacco: Never Used   Substance Use Topics    Alcohol use: Yes     Comment: socially    Drug use: No     Family History   Problem Relation Age of Onset    Breast cancer Biological Mother     Thyroid cancer Biological Mother     Polymyalgia rheumatica Biological Mother     Cancer Biological Father     Heart disease Paternal Grandmother     Thyroid disease Paternal Grandfather     Cancer Paternal Grandfather        Objective   Vitals  Vitals:    09/14/22 0803 09/14/22 0840 09/14/22 0841   BP: 132/84 140/80 120/80   Patient Position:  Sitting Standing   Pulse: 82     Resp: 13     Temp: 36.7 °C (98.1 °F)     Weight: 59 kg (130 lb)     Height: 1.524 m (5')       Body mass index is 25.39 kg/m².    Patient is well-appearing in no acute distress, alert and oriented x3 heart displays regular rate and rhythm and lungs are clear to auscultation    Advanced Care Plan  Does patient have advance directive?: Yes       Patient has Advance Directive: Advance Directive is NOT in chart, requested to bring in                             PHQ  Will the patient answer the depression questions?: Yes   Little interest or pleasure in doing things: Not at all   Feeling down, depressed, or hopeless: Not at all   Depression Risk: 0                                             Mini Cog   5 out of 5      Get Up and Go  Result: Pass    STEADI Falls Risk  One or more falls in the last year: No                                                       Falls screen completed: Yes     Hearing and Vision Screening  No exam data present  See HRA for relevant hearing screening response.    Assessment/Plan   Diagnoses and all orders for this visit:    Medicare annual wellness visit, subsequent (Primary)    Malignant neoplasm of left breast in female, estrogen receptor negative, unspecified site of breast (CMS/HCC)  -     TSH 3rd Generation    Beta thalassemia trait  -     TSH 3rd Generation    Dizziness  -     TSH 3rd  Generation  -     Ambulatory referral to Physical Therapy; Future    Fatigue, unspecified type  -     TSH 3rd Generation    Post-menopausal  -     DEXA BONE DENSITY; Future    Screening for osteoporosis  -     DEXA BONE DENSITY; Future    Balance disorder  -     Ambulatory referral to Physical Therapy; Future    Other orders  -     nitrofurantoin, macrocrystal-monohydrate, (MACROBID) 100 mg capsule; Take 1 capsule (100 mg total) by mouth 2 (two) times a day for 7 days.    Check DEXA scan, all other screenings up-to-date, check TSH, refer to physical therapy for vestibular therapy given patient's complaints, to some degree her dizziness is orthostatic given her blood pressures today, recommended proper hydration.    See Patient Instructions (the written plan) which was given to the patient for PPPS and health risk factors with interventions.

## 2022-09-15 ENCOUNTER — APPOINTMENT (OUTPATIENT)
Dept: LAB | Age: 72
End: 2022-09-15
Attending: INTERNAL MEDICINE
Payer: MEDICARE

## 2022-09-15 DIAGNOSIS — Z11.59 ENCOUNTER FOR SCREENING FOR OTHER VIRAL DISEASES: ICD-10-CM

## 2022-09-15 LAB
SARS-COV-2 RNA RESP QL NAA+PROBE: NEGATIVE
TSH SERPL DL<=0.05 MIU/L-ACNC: 2.03 MIU/L (ref 0.34–5.6)

## 2022-09-15 PROCEDURE — U0003 INFECTIOUS AGENT DETECTION BY NUCLEIC ACID (DNA OR RNA); SEVERE ACUTE RESPIRATORY SYNDROME CORONAVIRUS 2 (SARS-COV-2) (CORONAVIRUS DISEASE [COVID-19]), AMPLIFIED PROBE TECHNIQUE, MAKING USE OF HIGH THROUGHPUT TECHNOLOGIES AS DESCRIBED BY CMS-2020-01-R: HCPCS

## 2022-09-15 PROCEDURE — C9803 HOPD COVID-19 SPEC COLLECT: HCPCS

## 2022-09-16 NOTE — H&P
Admission History & Physical    Division of Cardiac Electrophysiology    Date of Admission: 2022    Name: Ruthy Kaminski    : 1950    MRN:[id number]    Attending Physician: Steve Montanez MD    HPI:  is a 72 y.o. female patient of Dr. Mattie Ramsey who has a past medical history BRCA+ Breast cancer s/p bilateral mastectomies, endometriosis s/pTAH-BSO, lightheadedness and shortness of breath who presents to [facility] today for an elective tilt table test. She recently had an echocardiogram completed, which showed normal LV function EF 65% and no significant valvular abnormalities. Her records were reviewed and do not show any recent stress testing or ambulatory cardiac monitor completed.    She reports that she had shortness of breath while caring for her mother after her mother was discharged from the hospital and she attributes her symptoms to her anxiety. At the time that she felt short of breath she was getting a manicure/pedicure and was worrying about her mother.  She also reports lightheadedness when changing position. She has never had a syncopal episode. Patient denies any chest pain/pressure, palpitations, paroxysmal nocturnal dyspnea, orthopnea, claudication, edema, or fatigue.      Review of Systems:  All pertinent positives noted in the HPI. A 10-point review of systems was completed and is otherwise negative.    Physical Exam:    Telemetry (22):  Normal Sinus Rhythm    Visit Vitals  /73   Pulse 69   Resp 16   Ht 1.524 m (5')   Wt 58.1 kg (128 lb)   SpO2 97%   BMI 25.00 kg/m²       Physical Exam  Constitutional:       Appearance: Normal appearance. She is well-developed and normal weight.   HENT:      Head: Normocephalic and atraumatic.      Right Ear: External ear normal.      Left Ear: External ear normal.      Nose: Nose normal.      Mouth/Throat:      Mouth: Mucous membranes are moist.      Pharynx: Oropharynx is clear.   Eyes:      Conjunctiva/sclera:  Conjunctivae normal.      Pupils: Pupils are equal, round, and reactive to light.   Cardiovascular:      Rate and Rhythm: Normal rate and regular rhythm.      Pulses: Normal pulses.      Heart sounds: Normal heart sounds. No murmur heard.    No friction rub. No gallop.   Pulmonary:      Effort: Pulmonary effort is normal.      Breath sounds: Normal breath sounds. No wheezing or rales.   Abdominal:      General: Abdomen is flat. Bowel sounds are normal.      Palpations: Abdomen is soft.   Musculoskeletal:         General: No deformity. Normal range of motion.      Cervical back: Normal range of motion and neck supple.      Right lower leg: No edema.      Left lower leg: No edema.   Skin:     General: Skin is warm and dry.   Neurological:      General: No focal deficit present.      Mental Status: She is alert and oriented to person, place, and time. Mental status is at baseline.   Psychiatric:         Mood and Affect: Mood normal.         Behavior: Behavior normal.         Thought Content: Thought content normal.         Judgment: Judgment normal.       PAST MEDICAL HISTORY:   Past Medical History:   Diagnosis Date    Baker's cyst of knee, left 9/19/2018    Beta thalassemia (CMS/HCC)     Breast cancer (CMS/HCC)     Osteoporosis    breast CA 9/05 s/p chemo/xrt  abnormal mammogram 2008 - bilateral in situ - bilateral mastectomies  oopherectomy in 2006 BRCA 1  h/o DORIE in 1986 for endometriosis   pstosis - bilateral eyelid surgery    PAST SURGICAL HISTORY: She has a past surgical history that includes Hysterectomy; Mastectomy; and Eye surgery.    SOCIAL HISTORY: She  Social History     Tobacco Use    Smoking status: Never Smoker    Smokeless tobacco: Never Used   Substance Use Topics    Alcohol use: Yes     Comment: socially    Drug use: No       1) Smoking history: Never    2) Alcohol consumption: Weekly    3) Caffeine use: No    4) Exercise days per week: 2 days    5) Recreational drug use: No    FAMILY  HISTORY:She has a family history includes Breast cancer in her biological mother; Cancer in her biological father and paternal grandfather; Heart disease in her paternal grandmother; Polymyalgia rheumatica in her biological mother; Thyroid cancer in her biological mother; Thyroid disease in her paternal grandfather.  Mother with congestive heart failure and heart attack    ALLERGIES:  Allergies   Allergen Reactions    Penicillin G     Cephalexin Monohydrate Rash     KEFLEX    Sulfa (Sulfonamide Antibiotics) Rash       CURRENT MEDICATIONS:   Current Outpatient Medications   Medication Instructions    pantoprazole (PROTONIX) 40 mg, oral, Daily (6a)    traZODone (DESYREL) 100 mg, oral, Nightly    traZODone (DESYREL) 50 mg, oral, Nightly        LAB DATA:     Latest Reference Range & Units 09/06/22 07:46 09/14/22 08:46 09/15/22 09:48   Sodium 136 - 144 mEQ/L 138     Potassium 3.6 - 5.1 mEQ/L 4.6     Chloride 98 - 109 mEQ/L 105     CO2 22 - 32 mEQ/L 28     BUN 8 - 20 mg/dL 16     Creatinine 0.6 - 1.1 mg/dL 0.8     Glucose 70 - 99 mg/dL 92     Calcium 8.9 - 10.3 mg/dL 9.5     AST (SGOT) 15 - 41 IU/L 14 (L)     ALT (SGPT) 11 - 54 IU/L 9 (L)     Alkaline Phosphatase 35 - 126 IU/L 65     Total Protein 6.0 - 8.2 g/dL 6.6     Albumin 3.4 - 5.0 g/dL 4.0     Bilirubin, Total 0.3 - 1.2 mg/dL 0.6     eGFR >=60.0 mL/min/1.73m*2 >60.0     Anion Gap 3 - 15 mEQ/L 5     Cholesterol <=200 mg/dL 203 (H)     Triglycerides 30 - 149 mg/dL 83     HDL >=55 mg/dL 70     LDL Calculated <=100 mg/dL 116 (H)     Non-HDL, Calculated mg/dL 133     RISK <=5.0  2.9     BNP <=100 pg/mL 68     TSH 0.34 - 5.60 mIU/L  2.03    WBC 3.80 - 10.50 K/uL 5.27     RBC 3.93 - 5.22 M/uL 6.07 (H)     Hemoglobin 11.8 - 15.7 g/dL 12.1     Hematocrit 35.0 - 45.0 % 40.2     MCV 83.0 - 98.0 fL 66.2 (L)     MCH 28.0 - 33.2 pg 19.9 (L)     MCHC 32.2 - 35.5 g/dL 30.1 (L)     RDW 11.7 - 14.4 % 18.1 (H)     Platelets 150 - 369 K/uL 308     MPV 9.4 - 12.3 fL 9.5      Differential Type  Auto     nRBC <=0.0 % 0.0     Immature Granulocytes % 0.4     Neutrophils % 55.6     Lymphocytes % 34.0     Monocytes % 6.6     Eosinophils % 2.8     Basophils % 0.6     Immature Granulocytes, Absolute 0.00 - 0.08 K/uL 0.02     Neutrophils, Absolute 1.70 - 7.00 K/uL 2.93     Lymphocytes, Absolute 1.20 - 3.50 K/uL 1.79     Monocytes, Absolute 0.28 - 0.80 K/uL 0.35     Eosinophils, Absolute 0.04 - 0.36 K/uL 0.15     Basophils, Absolute 0.01 - 0.10 K/uL 0.03     Platelet Estimate  Adequate (150,000-400,000)     Anisocytosis  1+     Hypochromia  1+     Microcytes  1+     Ovalocytes  Occasional     PLT Morphology  Normal     SARS-CoV-2 (COVID-19) Negative    Negative   (L): Data is abnormally low  (H): Data is abnormally high    CARDIAC STUDIES:    Transthoracic Echocardiogram 09/12/22:   The left ventricular ejection fraction is 65%.    Left ventricular wall motion is segmentally normal.    Diastolic function is normal.    The mitral leaflets are thickened without restriction of motion.      Impression and Plan:  1) Lightheadedness and shortness of breath    Patient reports that she had shortness of breath while caring for her mother after her mother was discharged from the hospital and she attributes her symptoms to her anxiety. At the time that she felt short of breath she was getting a manicure/pedicure and was worrying about her mother.  She also reports lightheadedness when changing position. She has never had a syncopal episode. She recently had an echocardiogram completed, which showed normal LV function EF 65% and no significant valvular abnormalities. Her records were reviewed and do not show any stress test or ambulatory cardiac monitor completed.  Of note, the patient states that at her recent PCP visit, her physician took her blood pressure while sitting and standing which noted a 20 mmHg drop upon standing.    She is stable and agreeable to undergo an elective tilt table test today.  Benefits and potential risks of the test were discussed in detail. All questions and concerns were addressed. Patient NPO since midnight.  She will follow-up with her primary cardiologist Dr. Mattie Ramsey as needed.    Attestation: Dr. Montanez  I reviewed the patient's record.  I obtained consent to perform a head up tilt table study.

## 2022-09-19 ENCOUNTER — HOSPITAL ENCOUNTER (OUTPATIENT)
Facility: HOSPITAL | Age: 72
Setting detail: HOSPITAL OUTPATIENT SURGERY
Discharge: HOME | End: 2022-09-19
Attending: INTERNAL MEDICINE | Admitting: INTERNAL MEDICINE
Payer: MEDICARE

## 2022-09-19 VITALS
RESPIRATION RATE: 8 BRPM | BODY MASS INDEX: 25.13 KG/M2 | HEART RATE: 68 BPM | HEIGHT: 60 IN | SYSTOLIC BLOOD PRESSURE: 134 MMHG | OXYGEN SATURATION: 98 % | DIASTOLIC BLOOD PRESSURE: 75 MMHG | WEIGHT: 128 LBS

## 2022-09-19 DIAGNOSIS — R42 LIGHTHEADEDNESS: Primary | ICD-10-CM

## 2022-09-19 LAB
ATRIAL RATE: 77
P AXIS: 69
POCT ACT-LR: 116 SEC (ref 113–149)
POCT TEST: NORMAL
PR INTERVAL: 156
QRS DURATION: 72
QT INTERVAL: 398
QTC CALCULATION(BAZETT): 450
R AXIS: -16
T WAVE AXIS: 3
VENTRICULAR RATE: 77

## 2022-09-19 PROCEDURE — 93660 TILT TABLE EVALUATION: CPT | Performed by: INTERNAL MEDICINE

## 2022-09-19 PROCEDURE — 4A03XB1 MEASUREMENT OF ARTERIAL PRESSURE, PERIPHERAL, EXTERNAL APPROACH: ICD-10-PCS | Performed by: INTERNAL MEDICINE

## 2022-09-19 PROCEDURE — 93005 ELECTROCARDIOGRAM TRACING: CPT | Performed by: PHYSICIAN ASSISTANT

## 2022-09-19 PROCEDURE — 4A02XFZ MEASUREMENT OF CARDIAC RHYTHM, EXTERNAL APPROACH: ICD-10-PCS | Performed by: INTERNAL MEDICINE

## 2022-09-19 NOTE — DISCHARGE INSTRUCTIONS
Your tilt table test did not suggest POTS syndrome.  It did suggest Orthostatic Hypotension.  Your blood pressure drops with changes in position.    Please see Dr. Ramsey in 1-2 weeks    Stay hydrated.  Drink electrolytes.  Stand slowly.  Wear compression socks.  Perform isometric exercises such as wall squats and wall planks as tolerated.  Sit or lay down immediately if you develop any symptoms.

## 2022-10-18 ENCOUNTER — HOSPITAL ENCOUNTER (OUTPATIENT)
Dept: RADIOLOGY | Age: 72
Discharge: HOME | End: 2022-10-18
Attending: FAMILY MEDICINE
Payer: MEDICARE

## 2022-10-18 DIAGNOSIS — Z13.820 SCREENING FOR OSTEOPOROSIS: ICD-10-CM

## 2022-10-18 DIAGNOSIS — Z78.0 POST-MENOPAUSAL: ICD-10-CM

## 2022-10-18 PROCEDURE — 77080 DXA BONE DENSITY AXIAL: CPT

## 2022-10-19 NOTE — RESULT ENCOUNTER NOTE
Please notify the patient that her osteoporosis screening exam basically is unchanged from previous still with slightly thin bones but not osteoporosis.  Would just recommend continued proper calcium and vitamin D intake and weightbearing activity.

## 2023-01-05 ENCOUNTER — TRANSCRIBE ORDERS (OUTPATIENT)
Dept: SCHEDULING | Age: 73
End: 2023-01-05

## 2023-01-05 DIAGNOSIS — R03.0 ELEVATED BLOOD-PRESSURE READING, WITHOUT DIAGNOSIS OF HYPERTENSION: ICD-10-CM

## 2023-01-05 DIAGNOSIS — I95.1 ORTHOSTATIC HYPOTENSION: Primary | ICD-10-CM

## 2023-01-07 ENCOUNTER — TRANSCRIBE ORDERS (OUTPATIENT)
Dept: CARDIOLOGY | Facility: HOSPITAL | Age: 73
End: 2023-01-07

## 2023-01-07 ENCOUNTER — APPOINTMENT (OUTPATIENT)
Dept: LAB | Age: 73
End: 2023-01-07
Attending: INTERNAL MEDICINE
Payer: MEDICARE

## 2023-01-07 DIAGNOSIS — R03.0 ELEVATED BLOOD-PRESSURE READING, WITHOUT DIAGNOSIS OF HYPERTENSION: ICD-10-CM

## 2023-01-07 DIAGNOSIS — I95.1 ORTHOSTATIC HYPOTENSION: ICD-10-CM

## 2023-01-07 DIAGNOSIS — E78.5 HYPERLIPIDEMIA, UNSPECIFIED: ICD-10-CM

## 2023-01-07 DIAGNOSIS — E78.5 HYPERLIPIDEMIA, UNSPECIFIED: Primary | ICD-10-CM

## 2023-01-07 LAB
ALBUMIN SERPL-MCNC: 4.1 G/DL (ref 3.4–5)
ALP SERPL-CCNC: 60 IU/L (ref 35–126)
ALT SERPL-CCNC: 11 IU/L (ref 11–54)
ANION GAP SERPL CALC-SCNC: 8 MEQ/L (ref 3–15)
AST SERPL-CCNC: 18 IU/L (ref 15–41)
BILIRUB SERPL-MCNC: 1.1 MG/DL (ref 0.3–1.2)
BUN SERPL-MCNC: 15 MG/DL (ref 8–20)
CALCIUM SERPL-MCNC: 9.7 MG/DL (ref 8.9–10.3)
CHLORIDE SERPL-SCNC: 106 MEQ/L (ref 98–109)
CHOLEST SERPL-MCNC: 148 MG/DL
CO2 SERPL-SCNC: 26 MEQ/L (ref 22–32)
CREAT SERPL-MCNC: 0.7 MG/DL (ref 0.6–1.1)
GFR SERPL CREATININE-BSD FRML MDRD: >60 ML/MIN/1.73M*2
GLUCOSE SERPL-MCNC: 98 MG/DL (ref 70–99)
HDLC SERPL-MCNC: 90 MG/DL
HDLC SERPL: 1.6 {RATIO}
LDLC SERPL CALC-MCNC: 46 MG/DL
NONHDLC SERPL-MCNC: 58 MG/DL
POTASSIUM SERPL-SCNC: 3.8 MEQ/L (ref 3.6–5.1)
PROT SERPL-MCNC: 6.3 G/DL (ref 6–8.2)
SODIUM SERPL-SCNC: 140 MEQ/L (ref 136–144)
TRIGL SERPL-MCNC: 62 MG/DL (ref 30–149)

## 2023-01-07 PROCEDURE — 83036 HEMOGLOBIN GLYCOSYLATED A1C: CPT | Mod: GA

## 2023-01-07 PROCEDURE — 80061 LIPID PANEL: CPT

## 2023-01-07 PROCEDURE — 36415 COLL VENOUS BLD VENIPUNCTURE: CPT

## 2023-01-07 PROCEDURE — 80053 COMPREHEN METABOLIC PANEL: CPT

## 2023-01-08 LAB
EST. AVERAGE GLUCOSE BLD GHB EST-MCNC: 97 MG/DL
HBA1C MFR BLD HPLC: 5 %

## 2023-01-10 ENCOUNTER — HOSPITAL ENCOUNTER (OUTPATIENT)
Dept: RADIOLOGY | Facility: CLINIC | Age: 73
Discharge: HOME | End: 2023-01-10
Attending: INTERNAL MEDICINE
Payer: MEDICARE

## 2023-01-10 DIAGNOSIS — I95.1 ORTHOSTATIC HYPOTENSION: ICD-10-CM

## 2023-01-10 DIAGNOSIS — R03.0 ELEVATED BLOOD-PRESSURE READING, WITHOUT DIAGNOSIS OF HYPERTENSION: ICD-10-CM

## 2023-01-10 PROCEDURE — 63600105 HC IODINE BASED CONTRAST: Performed by: INTERNAL MEDICINE

## 2023-01-10 PROCEDURE — 71275 CT ANGIOGRAPHY CHEST: CPT

## 2023-01-10 RX ORDER — IODIXANOL 320 MG/ML
100 INJECTION, SOLUTION INTRAVASCULAR ONCE
Status: COMPLETED | OUTPATIENT
Start: 2023-01-10 | End: 2023-01-10

## 2023-01-10 RX ADMIN — IODIXANOL 100 ML: 320 INJECTION, SOLUTION INTRAVASCULAR at 08:48

## 2023-02-22 ENCOUNTER — OFFICE VISIT (OUTPATIENT)
Dept: FAMILY MEDICINE | Facility: CLINIC | Age: 73
End: 2023-02-22
Payer: MEDICARE

## 2023-02-22 VITALS
HEART RATE: 80 BPM | DIASTOLIC BLOOD PRESSURE: 70 MMHG | HEIGHT: 60 IN | BODY MASS INDEX: 24.35 KG/M2 | SYSTOLIC BLOOD PRESSURE: 100 MMHG | TEMPERATURE: 97.1 F | WEIGHT: 124 LBS | RESPIRATION RATE: 14 BRPM

## 2023-02-22 DIAGNOSIS — F51.04 PSYCHOPHYSIOLOGICAL INSOMNIA: ICD-10-CM

## 2023-02-22 DIAGNOSIS — N30.00 ACUTE CYSTITIS WITHOUT HEMATURIA: Primary | ICD-10-CM

## 2023-02-22 DIAGNOSIS — Z17.1 MALIGNANT NEOPLASM OF LEFT BREAST IN FEMALE, ESTROGEN RECEPTOR NEGATIVE, UNSPECIFIED SITE OF BREAST (CMS/HCC): ICD-10-CM

## 2023-02-22 DIAGNOSIS — C50.912 MALIGNANT NEOPLASM OF LEFT BREAST IN FEMALE, ESTROGEN RECEPTOR NEGATIVE, UNSPECIFIED SITE OF BREAST (CMS/HCC): ICD-10-CM

## 2023-02-22 PROCEDURE — 87077 CULTURE AEROBIC IDENTIFY: CPT | Performed by: FAMILY MEDICINE

## 2023-02-22 PROCEDURE — 99214 OFFICE O/P EST MOD 30 MIN: CPT | Performed by: FAMILY MEDICINE

## 2023-02-22 RX ORDER — ZOLPIDEM TARTRATE 10 MG/1
10 TABLET ORAL NIGHTLY PRN
Qty: 90 TABLET | Refills: 1 | Status: SHIPPED | OUTPATIENT
Start: 2023-02-22 | End: 2024-06-06 | Stop reason: ALTCHOICE

## 2023-02-22 RX ORDER — LEVOFLOXACIN 500 MG/1
500 TABLET, FILM COATED ORAL DAILY
Qty: 7 TABLET | Refills: 0 | Status: SHIPPED | OUTPATIENT
Start: 2023-02-22 | End: 2023-03-01

## 2023-02-22 NOTE — PROGRESS NOTES
Subjective      Patient ID: Ruthy Kaminski is a 72 y.o. female.    HPI patient with a history of insomnia comes in today complaining of a UTI.  She has had recurrent UTIs as well, has had 3 in the last few weeks treated with Macrobid comes back about a week later, she has a pressure sensation and urgency.  No burning.  She has a history of breast cancer, chronic conditions are otherwise stable although trazodone is not working very well for her insomnia.  She reports that Ambien worked better.     Tobacco  Allergies  Meds  Problems  Med Hx  Surg Hx  Fam Hx       Review of Systems  Allergies   Allergen Reactions   • Penicillin G    • Cephalexin Monohydrate Rash     KEFLEX   • Sulfa (Sulfonamide Antibiotics) Rash     Current Outpatient Medications   Medication Sig Dispense Refill   • levoFLOXacin (LEVAQUIN) 500 mg tablet Take 1 tablet (500 mg total) by mouth daily for 7 days. 7 tablet 0   • zolpidem (AMBIEN) 10 mg tablet Take 1 tablet (10 mg total) by mouth nightly as needed for sleep. 90 tablet 1   • atorvastatin (LIPITOR) 10 mg tablet Take 10 mg by mouth daily.     • midodrine (PROAMATINE) 2.5 mg tablet Take 2.5 mg by mouth.     • pantoprazole (PROTONIX) 40 mg EC tablet Take 40 mg by mouth daily as needed.        No current facility-administered medications for this visit.     Past Medical History:   Diagnosis Date   • Baker's cyst of knee, left 9/19/2018   • Beta thalassemia (CMS/HCC)    • Breast cancer (CMS/HCC)    • Osteoporosis      Past Surgical History:   Procedure Laterality Date   • EYE SURGERY     • HYSTERECTOMY     • MASTECTOMY       Social History     Tobacco Use   • Smoking status: Never   • Smokeless tobacco: Never   Substance Use Topics   • Alcohol use: Yes     Comment: socially   • Drug use: No     Family History   Problem Relation Age of Onset   • Breast cancer Biological Mother    • Thyroid cancer Biological Mother    • Polymyalgia rheumatica Biological Mother    • Cancer Biological Father     • Heart disease Paternal Grandmother    • Thyroid disease Paternal Grandfather    • Cancer Paternal Grandfather        Objective   Vitals:    02/22/23 1308   BP: 100/70   Pulse: 80   Resp: 14   Temp: 36.2 °C (97.1 °F)   Weight: 56.2 kg (124 lb)   Height: 1.524 m (5')    Body mass index is 24.22 kg/m².  Physical Exam  Constitutional:       General: She is not in acute distress.     Appearance: She is well-developed.   Neurological:      Mental Status: She is alert and oriented to person, place, and time.   Psychiatric:         Speech: Speech normal.         Behavior: Behavior normal.         Assessment/Plan   Problem List Items Addressed This Visit     Malignant neoplasm of breast (CMS/HCC)    Insomnia   Other Visit Diagnoses     Acute cystitis without hematuria    -  Primary    Relevant Orders    Urine culture      Treat UTI with Levaquin 500 daily for 3 days.  Patient will discontinue trazodone due to interaction with Levaquin as well as the fact that its not working very well, restart Ambien 5 to 10 mg for insomnia.  Breast cancer stable.    Patient agrees and verbalizes understanding of medication and treatment plan discussed at today's visit.  Patient was instructed to call or follow-up if symptoms persist or worsen.         Jayme Donald, DO    This note was created with voice recognition software. Inadvertent dictation errors should be disregarded. Please contact my office for clarification.

## 2023-02-23 ENCOUNTER — TELEPHONE (OUTPATIENT)
Dept: FAMILY MEDICINE | Facility: CLINIC | Age: 73
End: 2023-02-23

## 2023-02-23 NOTE — TELEPHONE ENCOUNTER
Pt called stating that she took Levofloxacin on 2/22/2023 and 2/23/2023 and had an adverse reaction that have now subsided. Would like recommendation if need to stop medication or try a different medication. Please call pt at [phone].

## 2023-02-23 NOTE — TELEPHONE ENCOUNTER
Returned patient's call. Patient's symptoms have resolved with xanax. Patient had chest tightness, palpitations and dizziness related to Levaquin. Advised patient to not take anymore Levaquin. Patient is asking if she should change to a different antibiotic? Please advise.

## 2023-02-23 NOTE — TELEPHONE ENCOUNTER
Pt called today 2/23/2023 regarding adverse reaction to Levofloxacin that have now subsided. She described her symptoms were chest tightness and dizziness with a pulse of 87 BPM. A message was just sent today at 2:33pm, which was then sent to Dr. Donald. I would like to send this message just in case if a call to the pt is needed.

## 2023-02-24 LAB
BACTERIA UR CULT: ABNORMAL
BACTERIA UR CULT: ABNORMAL

## 2023-02-24 NOTE — TELEPHONE ENCOUNTER
"Request for Medical Advice (NON-URGENT)   Patient PCP: Jayme Donald, DO  New or Existing Issue: Existing  Question or Concern: Patient is asking if she can go \"cold turkey\" with Trazidone. Please call to advise how she should proceed with medications.  Preferred Pharmacy:   [facility]/pharmacy #[id number] - [place], [place] - [address]  [address]  [place] [address]  Phone: [phone] Fax: [phone]      The practice will reach out to discuss your Medical Question or Concern within 2 business days.   "

## 2023-02-24 NOTE — TELEPHONE ENCOUNTER
Spoke with patient regarding symptoms that can occur from withdrawal. Patient has not taken it the last two nights and would like to stay off the medication. As of right now the only withdrawal effect has been a hot flash. Patient will resume and do a slower taper is symptoms are bad.

## 2023-02-24 NOTE — TELEPHONE ENCOUNTER
Encounter symptoms are improved after 2 doses so at this time I would like to hold off on any further antibiotics, wait for sensitivity and see how her symptoms progress.

## 2023-03-07 ENCOUNTER — TELEPHONE (OUTPATIENT)
Dept: FAMILY MEDICINE | Facility: CLINIC | Age: 73
End: 2023-03-07
Payer: MEDICARE

## 2023-03-07 RX ORDER — NITROFURANTOIN 25; 75 MG/1; MG/1
100 CAPSULE ORAL 2 TIMES DAILY
Qty: 20 CAPSULE | Refills: 2 | Status: SHIPPED | OUTPATIENT
Start: 2023-03-07 | End: 2023-03-17

## 2023-03-07 NOTE — TELEPHONE ENCOUNTER
Pt called , she said that she was told to call and remind you to order her Macrobid because she is going away . Please send to [facility] on [address]. Thank you

## 2023-06-09 ENCOUNTER — OFFICE VISIT (OUTPATIENT)
Dept: HEMATOLOGY/ONCOLOGY | Facility: CLINIC | Age: 73
End: 2023-06-09
Attending: INTERNAL MEDICINE
Payer: MEDICARE

## 2023-06-09 VITALS
DIASTOLIC BLOOD PRESSURE: 73 MMHG | BODY MASS INDEX: 24.02 KG/M2 | SYSTOLIC BLOOD PRESSURE: 123 MMHG | HEART RATE: 88 BPM | WEIGHT: 123 LBS | TEMPERATURE: 98.1 F | OXYGEN SATURATION: 100 % | RESPIRATION RATE: 18 BRPM

## 2023-06-09 DIAGNOSIS — C50.912 MALIGNANT NEOPLASM OF LEFT BREAST IN FEMALE, ESTROGEN RECEPTOR NEGATIVE, UNSPECIFIED SITE OF BREAST (CMS/HCC): ICD-10-CM

## 2023-06-09 DIAGNOSIS — Z17.1 MALIGNANT NEOPLASM OF LEFT BREAST IN FEMALE, ESTROGEN RECEPTOR NEGATIVE, UNSPECIFIED SITE OF BREAST (CMS/HCC): ICD-10-CM

## 2023-06-09 PROCEDURE — 99213 OFFICE O/P EST LOW 20 MIN: CPT | Performed by: INTERNAL MEDICINE

## 2023-06-09 ASSESSMENT — ENCOUNTER SYMPTOMS
EYES NEGATIVE: 1
PSYCHIATRIC NEGATIVE: 1
RESPIRATORY NEGATIVE: 1
MUSCULOSKELETAL NEGATIVE: 1
HEMATOLOGIC/LYMPHATIC NEGATIVE: 1
ENDOCRINE NEGATIVE: 1
CARDIOVASCULAR NEGATIVE: 1
VOICE CHANGE: 1
FATIGUE: 1
GASTROINTESTINAL NEGATIVE: 1

## 2023-06-09 ASSESSMENT — PAIN SCALES - GENERAL: PAINLEVEL: 0-NO PAIN

## 2023-06-09 NOTE — ASSESSMENT & PLAN NOTE
Patient returns replate cancer regarding her history of breast cancer with BRCA1 mutation.  She was diagnosed with left breast cancer in 2005 and bilateral DCIS in 2008.  She had bilateral mastectomies.  Since her visit a year ago clinically she has been doing well.  Based on today's history and physical is no evidence of any new or metastatic disease.  We will continue with yearly oncologic surveillance.

## 2023-06-09 NOTE — PROGRESS NOTES
Review of Systems   Constitutional: Positive for fatigue.   HENT:   Positive for voice change. Trouble swallowing: related to allergy.    Eyes: Negative.    Respiratory: Negative.    Cardiovascular: Negative.    Gastrointestinal: Negative.    Endocrine: Negative.    Genitourinary: Negative.     Musculoskeletal: Negative.    Skin: Negative.    Hematological: Negative.    Psychiatric/Behavioral: Negative.

## 2023-06-09 NOTE — PROGRESS NOTES
Ruthy Kaminski is a 72 y.o. female, with history of BRCA1 associated breast cancers.  :  1950    Encounter Diagnosis   Name Primary?   • Malignant neoplasm of left breast in female, estrogen receptor negative, unspecified site of breast (CMS/HCC)         Cancer Staging   Malignant neoplasm of breast (CMS/HCC)  Staging form: Breast, AJCC 8th Edition  - Clinical: No stage assigned - Unsigned  - Pathologic stage from 2019: pT1c, pN0(sn), cM0, ER: Negative, NE: Negative, HER2: Negative - Signed by Marco Antonio Aguillon DO on 2019      Oncology History Overview Note     Diagnoses:  1. : Left breast cancer, infiltrating ductal ER/NE-negative, HER 2-negative, lymph node negative disease.  2. 2008: Bilateral ductal carcinoma in situ.  3. 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    Treatment History:  1. 10/21/05-06: Adjuvant dose dense Adriamycin, cyclophosphamide and paclitaxel.  2. 10/28/08: Bilateral mastectomy.  3. 06: Bilateral salpingo oophorectomy.         Malignant neoplasm of breast (CMS/HCC)   5/3/2018 Initial Diagnosis    Malignant neoplasm of breast (CMS/HCC) ([facility])     2019 Cancer Staged    Staging form: Breast, AJCC 8th Edition  - Pathologic stage from 2019: pT1c, pN0(sn), cM0, ER: Negative, NE: Negative, HER2: Negative         Patient Active Problem List   Diagnosis   • Malignant neoplasm of breast (CMS/HCC)   • Beta thalassemia trait   • Neck pain   • Baker's cyst of knee, left   • Insomnia   • Chronic low back pain       History of Present Illness  HPI  Ruthy Kaminski is seen today in follow up.  Patient returns the cancer center regarding her breast cancers.  Since her visit a year ago clinically she has been doing well.  She is noticed no change in her reconstructed chest wall.  Appetites been good.  She said no fevers or chills.  She complains of no bony type discomfort.    Review of Systems - Oncology  Review of Systems   Constitutional: Positive for fatigue.    HENT:   Positive for voice change. Trouble swallowing: related to allergy.    Eyes: Negative.    Respiratory: Negative.    Cardiovascular: Negative.    Gastrointestinal: Negative.    Endocrine: Negative.    Genitourinary: Negative.     Musculoskeletal: Negative.    Skin: Negative.    Hematological: Negative.    Psychiatric/Behavioral: Negative.    Review of Systems:  Nursing assessment reviewed. Pertinent positive and negative symptoms noted in HPI, all others negative.      Temp:  [36.7 °C (98.1 °F)] 36.7 °C (98.1 °F)  Heart Rate:  [88] 88  Resp:  [18] 18  BP: (123)/(73) 123/73  Visit Vitals  /73   Pulse 88   Temp 36.7 °C (98.1 °F)   Resp 18   Wt 55.8 kg (123 lb)   SpO2 100%   BMI 24.02 kg/m²     No data recorded  Physical Exam  Vitals and nursing note reviewed.   Constitutional:       Appearance: She is well-developed and normal weight. She is not diaphoretic.      Comments: Patient's ECOG performance status is 0.   HENT:      Head: Normocephalic and atraumatic.   Eyes:      General: No scleral icterus.     Conjunctiva/sclera: Conjunctivae normal.   Cardiovascular:      Rate and Rhythm: Normal rate and regular rhythm.      Heart sounds: Normal heart sounds. No murmur heard.  Pulmonary:      Effort: Pulmonary effort is normal. No respiratory distress.      Breath sounds: Normal breath sounds. No wheezing, rhonchi or rales.   Chest:      Comments: Has had bilateral mastectomies with reconstruction.  There are no lesions in the scar, skin or chest wall to suggest new or recurrent disease.  Abdominal:      General: Bowel sounds are normal.      Palpations: Abdomen is soft. There is no mass.      Tenderness: There is no abdominal tenderness.      Comments: There is no gross enlargement of the liver or spleen.   Musculoskeletal:      Comments: No tenderness percussion of the spine.   Lymphadenopathy:      Cervical: No cervical adenopathy.   Skin:     General: Skin is warm and dry.   Neurological:      Mental  Status: She is alert and oriented to person, place, and time.   Psychiatric:         Mood and Affect: Mood normal.         Behavior: Behavior normal.         Thought Content: Thought content normal.         Past Medical History:   Diagnosis Date   • Baker's cyst of knee, left 9/19/2018   • Beta thalassemia (CMS/HCC)    • Breast cancer (CMS/HCC)    • Osteoporosis        OB History   No obstetric history on file.     Gynecologic History:  Age at menarche: No age on file  Age at first live birth: No age on file   Age at menopause: No age on file    Past Surgical History:   Procedure Laterality Date   • EYE SURGERY     • HYSTERECTOMY     • MASTECTOMY         Social History     Tobacco Use   • Smoking status: Never   • Smokeless tobacco: Never   Substance Use Topics   • Alcohol use: Yes     Comment: socially   • Drug use: No       Family History   Problem Relation Age of Onset   • Breast cancer Biological Mother    • Thyroid cancer Biological Mother    • Polymyalgia rheumatica Biological Mother    • Cancer Biological Father    • Heart disease Paternal Grandmother    • Thyroid disease Paternal Grandfather    • Cancer Paternal Grandfather          Allergies  Penicillin g, Cephalexin monohydrate, Levaquin [levofloxacin], and Sulfa (sulfonamide antibiotics)    Medications    Current Outpatient Medications:   •  atorvastatin (LIPITOR) 10 mg tablet, Take 10 mg by mouth daily., Disp: , Rfl:   •  midodrine (PROAMATINE) 2.5 mg tablet, Take 2.5 mg by mouth., Disp: , Rfl:   •  pantoprazole (PROTONIX) 40 mg EC tablet, Take 40 mg by mouth daily as needed. , Disp: , Rfl:   •  zolpidem (AMBIEN) 10 mg tablet, Take 1 tablet (10 mg total) by mouth nightly as needed for sleep., Disp: 90 tablet, Rfl: 1     Laboratory    No results found for this or any previous visit (from the past 504 hour(s)).      Radiology    No results found.    Assessment and Plan  Malignant neoplasm of breast (CMS/HCC) (HCC)  Patient returns replate cancer regarding  her history of breast cancer with BRCA1 mutation.  She was diagnosed with left breast cancer in 2005 and bilateral DCIS in 2008.  She had bilateral mastectomies.  Since her visit a year ago clinically she has been doing well.  Based on today's history and physical is no evidence of any new or metastatic disease.  We will continue with yearly oncologic surveillance.      Marco Antonio Aguillon DO, MS  Medical Director, [facility]

## 2023-06-09 NOTE — LETTER
June 9, 2023    Patient: Ruthy Kaminski   YOB: 1950   Date of Visit: 6/9/2023       Dear Dr. Donald:    The patient is seen back at the [facility] [place] today in follow up with regard to her   1. Malignant neoplasm of left breast in female, estrogen receptor negative, unspecified site of breast (CMS/HCC)    . Attached is my assessment and plan of care.         Sincerely,        Marco Antonio Aguillon,     CC: No Recipients

## 2023-06-27 ENCOUNTER — TELEPHONE (OUTPATIENT)
Dept: FAMILY MEDICINE | Facility: CLINIC | Age: 73
End: 2023-06-27

## 2023-06-27 RX ORDER — AZITHROMYCIN 250 MG/1
TABLET, FILM COATED ORAL
Qty: 6 TABLET | Refills: 0 | Status: SHIPPED | OUTPATIENT
Start: 2023-06-27 | End: 2024-01-16 | Stop reason: ALTCHOICE

## 2023-06-27 NOTE — TELEPHONE ENCOUNTER
Ruthy and , Rosey, are going out of the country on Monday and requesting a Z pack for each of them to 'stay protected' if possible. They are requesting them be sent to the pharmacy below. Please reach out to Ruthy at [phone] if there are any questions.  Preferred Pharmacy:   [facility]/pharmacy #[id number] - [place], [place] - [address]  [address]  [place] [address]  Phone: [phone] Fax: [phone]      Please allow 2 business days for your provider to send your medication request or to reach out to discuss.

## 2023-09-12 ENCOUNTER — TRANSCRIBE ORDERS (OUTPATIENT)
Dept: LAB | Age: 73
End: 2023-09-12

## 2023-09-12 ENCOUNTER — APPOINTMENT (OUTPATIENT)
Dept: LAB | Age: 73
End: 2023-09-12
Attending: INTERNAL MEDICINE
Payer: MEDICARE

## 2023-09-12 DIAGNOSIS — R03.0 ELEVATED BLOOD-PRESSURE READING, WITHOUT DIAGNOSIS OF HYPERTENSION: ICD-10-CM

## 2023-09-12 DIAGNOSIS — I95.1 ORTHOSTATIC HYPOTENSION: ICD-10-CM

## 2023-09-12 DIAGNOSIS — E78.5 HYPERLIPIDEMIA, UNSPECIFIED: Primary | ICD-10-CM

## 2023-09-12 DIAGNOSIS — R73.09 OTHER ABNORMAL GLUCOSE: ICD-10-CM

## 2023-09-12 DIAGNOSIS — E78.5 HYPERLIPIDEMIA, UNSPECIFIED: ICD-10-CM

## 2023-09-12 LAB
ALBUMIN SERPL-MCNC: 4.5 G/DL (ref 3.5–5.7)
ALP SERPL-CCNC: 61 IU/L (ref 34–125)
ALT SERPL-CCNC: 7 IU/L (ref 7–52)
ANION GAP SERPL CALC-SCNC: 10 MEQ/L (ref 3–15)
ANISOCYTOSIS BLD QL SMEAR: ABNORMAL
AST SERPL-CCNC: 14 IU/L (ref 13–39)
BASOPHILS # BLD: 0 K/UL (ref 0.01–0.1)
BASOPHILS NFR BLD: 0 %
BILIRUB SERPL-MCNC: 0.6 MG/DL (ref 0.3–1.2)
BUN SERPL-MCNC: 16 MG/DL (ref 7–25)
CALCIUM SERPL-MCNC: 10 MG/DL (ref 8.6–10.3)
CHLORIDE SERPL-SCNC: 103 MEQ/L (ref 98–107)
CHOLEST SERPL-MCNC: 146 MG/DL
CO2 SERPL-SCNC: 28 MEQ/L (ref 21–31)
CREAT SERPL-MCNC: 0.7 MG/DL (ref 0.6–1.2)
DIFFERENTIAL METHOD BLD: ABNORMAL
EOSINOPHIL # BLD: 0.26 K/UL (ref 0.04–0.36)
EOSINOPHIL NFR BLD: 5 %
ERYTHROCYTE [DISTWIDTH] IN BLOOD BY AUTOMATED COUNT: 17.8 % (ref 11.7–14.4)
EST. AVERAGE GLUCOSE BLD GHB EST-MCNC: 105 MG/DL
GFR SERPL CREATININE-BSD FRML MDRD: >60 ML/MIN/1.73M*2
GLUCOSE SERPL-MCNC: 85 MG/DL (ref 70–99)
HBA1C MFR BLD: 5.3 %
HCT VFR BLDCO AUTO: 40.7 % (ref 35–45)
HDLC SERPL-MCNC: 72 MG/DL
HDLC SERPL: 2 {RATIO}
HGB BLD-MCNC: 12.6 G/DL (ref 11.8–15.7)
LDLC SERPL CALC-MCNC: 55 MG/DL
LYMPHOCYTES # BLD: 1.32 K/UL (ref 1.2–3.5)
LYMPHOCYTES NFR BLD: 25 %
MCH RBC QN AUTO: 20 PG (ref 28–33.2)
MCHC RBC AUTO-ENTMCNC: 31 G/DL (ref 32.2–35.5)
MCV RBC AUTO: 64.7 FL (ref 83–98)
MICROCYTES BLD QL SMEAR: ABNORMAL
MONOCYTES # BLD: 0.26 K/UL (ref 0.28–0.8)
MONOCYTES NFR BLD: 5 %
NEUTS BAND # BLD: 3.42 K/UL (ref 1.7–7)
NEUTS SEG NFR BLD: 65 %
NONHDLC SERPL-MCNC: 74 MG/DL
PDW BLD AUTO: 10 FL (ref 9.4–12.3)
PLAT MORPH BLD: NORMAL
PLATELET # BLD AUTO: 290 K/UL (ref 150–369)
PLATELET # BLD EST: ABNORMAL 10*3/UL
POIKILOCYTOSIS BLD QL SMEAR: ABNORMAL
POTASSIUM SERPL-SCNC: 4.2 MEQ/L (ref 3.5–5.1)
PROT SERPL-MCNC: 7 G/DL (ref 6–8.2)
RBC # BLD AUTO: 6.29 M/UL (ref 3.93–5.22)
SODIUM SERPL-SCNC: 141 MEQ/L (ref 136–145)
TRIGL SERPL-MCNC: 93 MG/DL
TSH SERPL DL<=0.05 MIU/L-ACNC: 2.46 MIU/L (ref 0.34–5.6)
WBC # BLD AUTO: 5.26 K/UL (ref 3.8–10.5)

## 2023-09-12 PROCEDURE — 80061 LIPID PANEL: CPT

## 2023-09-12 PROCEDURE — 80053 COMPREHEN METABOLIC PANEL: CPT

## 2023-09-12 PROCEDURE — 85025 COMPLETE CBC W/AUTO DIFF WBC: CPT

## 2023-09-12 PROCEDURE — 83036 HEMOGLOBIN GLYCOSYLATED A1C: CPT

## 2023-09-12 PROCEDURE — 36415 COLL VENOUS BLD VENIPUNCTURE: CPT

## 2023-09-12 PROCEDURE — 84443 ASSAY THYROID STIM HORMONE: CPT

## 2024-01-16 ENCOUNTER — CONSULT (OUTPATIENT)
Dept: FAMILY MEDICINE | Facility: CLINIC | Age: 74
End: 2024-01-16
Payer: MEDICARE

## 2024-01-16 ENCOUNTER — TELEPHONE (OUTPATIENT)
Dept: FAMILY MEDICINE | Facility: CLINIC | Age: 74
End: 2024-01-16

## 2024-01-16 VITALS
HEART RATE: 74 BPM | TEMPERATURE: 97.6 F | SYSTOLIC BLOOD PRESSURE: 112 MMHG | BODY MASS INDEX: 24.41 KG/M2 | DIASTOLIC BLOOD PRESSURE: 66 MMHG | WEIGHT: 125 LBS | RESPIRATION RATE: 16 BRPM

## 2024-01-16 DIAGNOSIS — C50.912 MALIGNANT NEOPLASM OF LEFT BREAST IN FEMALE, ESTROGEN RECEPTOR NEGATIVE, UNSPECIFIED SITE OF BREAST (CMS/HCC): ICD-10-CM

## 2024-01-16 DIAGNOSIS — H25.9 SENILE CATARACT OF LEFT EYE, UNSPECIFIED AGE-RELATED CATARACT TYPE: Primary | ICD-10-CM

## 2024-01-16 DIAGNOSIS — Z17.1 MALIGNANT NEOPLASM OF LEFT BREAST IN FEMALE, ESTROGEN RECEPTOR NEGATIVE, UNSPECIFIED SITE OF BREAST (CMS/HCC): ICD-10-CM

## 2024-01-16 DIAGNOSIS — K21.9 GASTROESOPHAGEAL REFLUX DISEASE, UNSPECIFIED WHETHER ESOPHAGITIS PRESENT: ICD-10-CM

## 2024-01-16 DIAGNOSIS — R42 ORTHOSTATIC DIZZINESS: ICD-10-CM

## 2024-01-16 PROCEDURE — 99214 OFFICE O/P EST MOD 30 MIN: CPT | Performed by: FAMILY MEDICINE

## 2024-01-16 NOTE — PROGRESS NOTES
Subjective      Patient ID: Ruthy Kaminski is a 73 y.o. female.    HPI patient is here for preop clearance for cataract surgery.  History of GERD and past history of breast cancer, both of these conditions are stable.  Insomnia and high cholesterol are also stable.  Patient feels well and has no concerns denies any chest pain or shortness of breath or any exertional symptoms.  She does suffer from orthostatic dizziness which is stable.  No recent fevers illnesses coughs or cold symptoms.       Tobacco  Allergies  Meds  Problems  Med Hx  Surg Hx  Fam Hx       Review of Systems  Allergies   Allergen Reactions   • Penicillin G    • Cephalexin Monohydrate Rash     KEFLEX   • Levaquin [Levofloxacin] Palpitations   • Sulfa (Sulfonamide Antibiotics) Rash     Current Outpatient Medications   Medication Sig Dispense Refill   • atorvastatin (LIPITOR) 10 mg tablet Take 10 mg by mouth daily.     • midodrine (PROAMATINE) 2.5 mg tablet Take 2.5 mg by mouth.     • pantoprazole (PROTONIX) 40 mg EC tablet Take 40 mg by mouth daily as needed.      • zolpidem (AMBIEN) 10 mg tablet Take 1 tablet (10 mg total) by mouth nightly as needed for sleep. 90 tablet 1     No current facility-administered medications for this visit.     Past Medical History:   Diagnosis Date   • Baker's cyst of knee, left 9/19/2018   • Beta thalassemia (CMS/HCC)    • Breast cancer (CMS/HCC)    • Osteoporosis      Past Surgical History:   Procedure Laterality Date   • EYE SURGERY     • HYSTERECTOMY     • MASTECTOMY       Social History     Tobacco Use   • Smoking status: Never   • Smokeless tobacco: Never   Substance Use Topics   • Alcohol use: Yes     Comment: socially   • Drug use: No     Family History   Problem Relation Age of Onset   • Breast cancer Biological Mother    • Thyroid cancer Biological Mother    • Polymyalgia rheumatica Biological Mother    • Cancer Biological Father    • Heart disease Paternal Grandmother    • Thyroid disease Paternal  Grandfather    • Cancer Paternal Grandfather        Objective   Vitals:    01/16/24 1032   BP: 112/66   Pulse: 74   Resp: 16   Temp: 36.4 °C (97.6 °F)   Weight: 56.7 kg (125 lb)    Body mass index is 24.41 kg/m².  Physical Exam  Constitutional:       General: She is not in acute distress.     Appearance: She is well-developed. She is not toxic-appearing.   HENT:      Head: Normocephalic.      Right Ear: Tympanic membrane, ear canal and external ear normal.      Left Ear: Tympanic membrane, ear canal and external ear normal.      Nose: Nose normal.      Mouth/Throat:      Mouth: Mucous membranes are moist.      Pharynx: No oropharyngeal exudate or posterior oropharyngeal erythema.   Eyes:      General: Lids are normal.      Conjunctiva/sclera: Conjunctivae normal.   Neck:      Thyroid: No thyroid mass or thyromegaly.   Cardiovascular:      Rate and Rhythm: Normal rate and regular rhythm.      Heart sounds: Normal heart sounds. No murmur heard.  Pulmonary:      Effort: Pulmonary effort is normal. No respiratory distress.      Breath sounds: Normal breath sounds.   Musculoskeletal:      Cervical back: Neck supple.   Lymphadenopathy:      Head:      Right side of head: No submental, submandibular, preauricular, posterior auricular or occipital adenopathy.      Left side of head: No submental, submandibular, preauricular, posterior auricular or occipital adenopathy.      Cervical: No cervical adenopathy.   Neurological:      Mental Status: She is alert and oriented to person, place, and time.   Psychiatric:      Comments: Alert and oriented         Assessment/Plan   Problem List Items Addressed This Visit     Malignant neoplasm of breast (CMS/HCC)    Gastroesophageal reflux disease    Orthostatic dizziness   Other Visit Diagnoses     Senile cataract of left eye, unspecified age-related cataract type    -  Primary      Chronic conditions are stable continue all current medications, patient may proceed with cataract  surgery as planned.    Patient agrees and verbalizes understanding of medication and treatment plan discussed at today's visit.  Patient was instructed to call or follow-up if symptoms persist or worsen.         Jayme Donald, DO    This note was created with voice recognition software. Inadvertent dictation errors should be disregarded. Please contact my office for clarification.

## 2024-06-06 RX ORDER — TRAZODONE HYDROCHLORIDE 100 MG/1
100 TABLET ORAL NIGHTLY
Qty: 90 TABLET | Refills: 3 | Status: SHIPPED | OUTPATIENT
Start: 2024-06-06 | End: 2025-06-06

## 2024-07-16 ENCOUNTER — OFFICE VISIT (OUTPATIENT)
Dept: HEMATOLOGY/ONCOLOGY | Facility: CLINIC | Age: 74
End: 2024-07-16
Attending: INTERNAL MEDICINE
Payer: MEDICARE

## 2024-07-16 ENCOUNTER — TRANSCRIBE ORDERS (OUTPATIENT)
Dept: SCHEDULING | Age: 74
End: 2024-07-16

## 2024-07-16 ENCOUNTER — HOSPITAL ENCOUNTER (OUTPATIENT)
Facility: CLINIC | Age: 74
Discharge: STILL ADMITTED OR ADMINISTRATIVE | End: 2024-07-16
Payer: MEDICARE

## 2024-07-16 VITALS
HEART RATE: 63 BPM | BODY MASS INDEX: 24.22 KG/M2 | WEIGHT: 124 LBS | TEMPERATURE: 97.3 F | SYSTOLIC BLOOD PRESSURE: 126 MMHG | DIASTOLIC BLOOD PRESSURE: 60 MMHG

## 2024-07-16 DIAGNOSIS — R13.12 DYSPHAGIA, OROPHARYNGEAL PHASE: Primary | ICD-10-CM

## 2024-07-16 DIAGNOSIS — C50.912 MALIGNANT NEOPLASM OF LEFT BREAST IN FEMALE, ESTROGEN RECEPTOR NEGATIVE, UNSPECIFIED SITE OF BREAST (CMS/HCC): ICD-10-CM

## 2024-07-16 DIAGNOSIS — Z17.1 MALIGNANT NEOPLASM OF LEFT BREAST IN FEMALE, ESTROGEN RECEPTOR NEGATIVE, UNSPECIFIED SITE OF BREAST (CMS/HCC): ICD-10-CM

## 2024-07-16 PROCEDURE — 99213 OFFICE O/P EST LOW 20 MIN: CPT | Performed by: INTERNAL MEDICINE

## 2024-07-16 RX ORDER — MELOXICAM 7.5 MG/1
7.5 TABLET ORAL DAILY
COMMUNITY
Start: 2024-07-15

## 2024-07-16 RX ORDER — TRIAMCINOLONE ACETONIDE 1 MG/G
CREAM TOPICAL 2 TIMES DAILY
COMMUNITY
Start: 2024-06-28 | End: 2025-01-16 | Stop reason: ALTCHOICE

## 2024-07-16 ASSESSMENT — ENCOUNTER SYMPTOMS
RESPIRATORY NEGATIVE: 1
FREQUENCY: 1
NAUSEA: 1
TROUBLE SWALLOWING: 1
CONSTIPATION: 1
MUSCULOSKELETAL NEGATIVE: 1
DIZZINESS: 1
FATIGUE: 1
DIARRHEA: 1
VOMITING: 1
CARDIOVASCULAR NEGATIVE: 1

## 2024-07-16 ASSESSMENT — PAIN SCALES - GENERAL: PAINLEVEL: 0-NO PAIN

## 2024-07-16 NOTE — PROGRESS NOTES
Review of Systems   Constitutional:  Positive for fatigue.        Early satiety   HENT:   Positive for trouble swallowing.         Having a swallow study and endoscopy in August    Respiratory: Negative.     Cardiovascular: Negative.    Gastrointestinal:  Positive for constipation, diarrhea, nausea and vomiting.        Vomited bile last week   Genitourinary:  Positive for frequency.    Musculoskeletal: Negative.    Skin: Negative.    Neurological:  Positive for dizziness.        Hx orthostatic hypotension

## 2024-07-16 NOTE — PROGRESS NOTES
Ruthy Kaminski is a 73 y.o. female, with metachronous breast cancers    BRCA1 mutation.  :  1950    Encounter Diagnosis   Name Primary?    Malignant neoplasm of left breast in female, estrogen receptor negative, unspecified site of breast (CMS/HCC)         Cancer Staging   Malignant neoplasm of breast (CMS/HCC)  Staging form: Breast, AJCC 8th Edition  - Clinical: No stage assigned - Unsigned  - Pathologic stage from 2019: pT1c, pN0(sn), cM0, ER: Negative, NH: Negative, HER2: Negative - Signed by Marco Antonio Aguillon DO on 2019      Oncology History Overview Note     Diagnoses:  1. : Left breast cancer, infiltrating ductal ER/NH-negative, HER 2-negative, lymph node negative disease.  2. 2008: Bilateral ductal carcinoma in situ.  3. 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    Treatment History:  1. 10/21/05-06: Adjuvant dose dense Adriamycin, cyclophosphamide and paclitaxel.  2. 10/28/08: Bilateral mastectomy.  3. 06: Bilateral salpingo oophorectomy.         Malignant neoplasm of breast (CMS/HCC)   5/3/2018 Initial Diagnosis    Malignant neoplasm of breast (CMS/HCC) ([facility])     2019 Cancer Staged    Staging form: Breast, AJCC 8th Edition  - Pathologic stage from 2019: pT1c, pN0(sn), cM0, ER: Negative, NH: Negative, HER2: Negative         Patient Active Problem List   Diagnosis    Malignant neoplasm of breast (CMS/HCC)    Beta thalassemia trait    Neck pain    Baker's cyst of knee, left    Insomnia    Chronic low back pain    Gastroesophageal reflux disease    Orthostatic dizziness       History of Present Illness  HPI  Ruthy Kaminski is seen today in follow up.    Patient returns to the [facility] for yearly follow-up regarding her history of breast cancers.  Since her visit a year ago clinically she has been doing fairly well.  She remains quite active.  Her major issue is some difficulty swallowing feeling that material gets stuck in the upper esophagus.   She also complains of some epigastric discomfort upon occasion.  She has seen Dr. Oliver of gastroenterology and a video swallow and upper endoscopy are planned.    Patient has noted no changes in her reconstructed breast bilaterally.  She gives no history of bony type discomfort.  Her appetite itself has been good.    Review of Systems - Oncology  Review of Systems   Constitutional:  Positive for fatigue.        Early satiety   HENT:   Positive for trouble swallowing.         Having a swallow study and endoscopy in August    Respiratory: Negative.     Cardiovascular: Negative.    Gastrointestinal:  Positive for constipation, diarrhea, nausea and vomiting.        Vomited bile last week   Genitourinary:  Positive for frequency.    Musculoskeletal: Negative.    Skin: Negative.    Neurological:  Positive for dizziness.        Hx orthostatic hypotension   Review of Systems:  Nursing assessment reviewed. Pertinent positive and negative symptoms noted in HPI, all others negative.      Temp:  [36.3 °C (97.3 °F)] 36.3 °C (97.3 °F)  Heart Rate:  [63] 63  BP: (126)/(60) 126/60  Visit Vitals  /60 (BP Location: Right upper arm, Patient Position: Sitting)   Pulse 63   Temp 36.3 °C (97.3 °F) (Temporal)   Wt 56.2 kg (124 lb)   BMI 24.22 kg/m²     No data recorded  Physical Exam  Vitals and nursing note reviewed.   Constitutional:       Appearance: She is well-developed and normal weight. She is not diaphoretic.      Comments: Patient's ECOG performance status is 0.   HENT:      Head: Normocephalic and atraumatic.   Eyes:      General: No scleral icterus.     Conjunctiva/sclera: Conjunctivae normal.   Neck:      Comments: Cannot palpate any enlarged axillary or inguinal lymph nodes.  Cardiovascular:      Rate and Rhythm: Normal rate and regular rhythm.      Heart sounds: Normal heart sounds. No murmur heard.  Pulmonary:      Effort: Pulmonary effort is normal. No respiratory distress.      Breath sounds: Normal breath  sounds. No wheezing, rhonchi or rales.   Chest:      Comments: Patient had bilateral mastectomies with implant reconstruction.  There are no lesions in the scar, skin, chest wall or axilla on either side to suggest new or recurrent disease  Abdominal:      General: Bowel sounds are normal.      Palpations: Abdomen is soft. There is no mass.      Tenderness: There is no abdominal tenderness.      Comments: There is no gross enlargement of the spleen or liver.   Musculoskeletal:      Right lower leg: No edema.      Left lower leg: No edema.      Comments: There is no tenderness percussion of cervical, thoracic or lumbar spines.   Lymphadenopathy:      Cervical: No cervical adenopathy.   Skin:     General: Skin is warm and dry.      Coloration: Skin is not jaundiced.   Neurological:      Mental Status: She is alert and oriented to person, place, and time.   Psychiatric:         Mood and Affect: Mood normal.         Behavior: Behavior normal.         Thought Content: Thought content normal.         Past Medical History:   Diagnosis Date    Baker's cyst of knee, left 9/19/2018    Beta thalassemia (CMS/HCC)     Breast cancer (CMS/HCC)     Osteoporosis        OB History   No obstetric history on file.     Gynecologic History:  Age at menarche: No age on file  Age at first live birth: No age on file   Age at menopause: No age on file    Past Surgical History:   Procedure Laterality Date    EYE SURGERY      HYSTERECTOMY      MASTECTOMY         Social History     Tobacco Use    Smoking status: Never    Smokeless tobacco: Never   Substance Use Topics    Alcohol use: Yes     Comment: socially    Drug use: No       Family History   Problem Relation Age of Onset    Breast cancer Biological Mother     Thyroid cancer Biological Mother     Polymyalgia rheumatica Biological Mother     Cancer Biological Father     Heart disease Paternal Grandmother     Thyroid disease Paternal Grandfather     Cancer Paternal Grandfather           Allergies  Penicillin g, Cephalexin monohydrate, Levaquin [levofloxacin], and Sulfa (sulfonamide antibiotics)    Medications    Current Outpatient Medications:     atorvastatin (LIPITOR) 10 mg tablet, Take 10 mg by mouth daily., Disp: , Rfl:     meloxicam (MOBIC) 7.5 mg tablet, Take 7.5 mg by mouth daily., Disp: , Rfl:     pantoprazole (PROTONIX) 40 mg EC tablet, Take 40 mg by mouth daily as needed. , Disp: , Rfl:     traZODone (DESYREL) 100 mg tablet, Take 1 tablet (100 mg total) by mouth nightly., Disp: 90 tablet, Rfl: 3    midodrine (PROAMATINE) 2.5 mg tablet, Take 2.5 mg by mouth., Disp: , Rfl:     triamcinolone (KENALOG) 0.1 % cream, Apply topically 2 (two) times a day., Disp: , Rfl:      Laboratory    No results found for this or any previous visit (from the past 504 hour(s)).      Radiology    No results found.    Assessment and Plan  Malignant neoplasm of breast (CMS/HCC) (HCC)  Patient is seen at [facility] regarding her metachronous breast cancers in the background of BRCA1 mutation.  She had infiltrating ductal carcinoma 2005 and DCIS in 2008.  She has had bilateral mastectomies.  Since her visit a year ago her major issue has been some difficulty swallowing and she is seeing Dr. Oliver of gastroenterology for this.  Based on today's history and physical there is no evidence of any recurrent disease.  She will continue with yearly follow-ups.    Marco Antonio Aguillon DO, MS  Medical Director, [facility]

## 2024-07-16 NOTE — ASSESSMENT & PLAN NOTE
Patient is seen at [facility] regarding her metachronous breast cancers in the background of BRCA1 mutation.  She had infiltrating ductal carcinoma 2005 and DCIS in 2008.  She has had bilateral mastectomies.  Since her visit a year ago her major issue has been some difficulty swallowing and she is seeing Dr. Oliver of gastroenterology for this.  Based on today's history and physical there is no evidence of any recurrent disease.  She will continue with yearly follow-ups.

## 2024-07-16 NOTE — LETTER
July 16, 2024    Patient: Ruthy Kaminski   YOB: 1950   Date of Visit: 7/16/2024       Dear Dr. Donald:    The patient is seen back at the [facility] [place] today in follow up with regard to her   1. Malignant neoplasm of left breast in female, estrogen receptor negative, unspecified site of breast (CMS/HCC)    . Attached is my assessment and plan of care.         Sincerely,        Marco Antonio Aguillon,     CC: No Recipients

## 2024-08-13 ENCOUNTER — HOSPITAL ENCOUNTER (OUTPATIENT)
Dept: RADIOLOGY | Facility: HOSPITAL | Age: 74
Discharge: HOME | End: 2024-08-13
Attending: INTERNAL MEDICINE
Payer: MEDICARE

## 2024-08-13 DIAGNOSIS — R13.12 DYSPHAGIA, OROPHARYNGEAL PHASE: ICD-10-CM

## 2024-08-13 PROCEDURE — 92611 MOTION FLUOROSCOPY/SWALLOW: CPT | Mod: GN

## 2024-08-13 PROCEDURE — 74230 X-RAY XM SWLNG FUNCJ C+: CPT

## 2024-08-13 NOTE — PROCEDURES
08/13/24 Video swallow study was completed. Please refer to the imaging section for full report and recommendations.        Results for orders placed during the hospital encounter of 08/13/24    FLUOROSCOPY VIDEO SWALLOW WITH SPEECH (Preliminary)  This result has not been signed. Information might be incomplete.    Impression  IMPRESSION:  1. Functional oral stage.  2. Functional pharyngeal dysphagia. No penetration or aspiration was identified  during today's study.  3. Mild esophageal dysphagia noted during screening. There was bolus holdup  noted within the lower level of the esophagus that cleared with a liquid wash.        Dr. Juan Wen was present for this examination and reviewed this study.  The undersigned radiologist agrees only with the radiographic findings.  Specific swallowing recommendations are solely the purview of the Speech  Pathology Division.  Performed and written by Veronika Robles M.S.CCC-SLP

## 2024-11-14 ENCOUNTER — APPOINTMENT (OUTPATIENT)
Dept: LAB | Age: 74
End: 2024-11-14
Attending: INTERNAL MEDICINE
Payer: MEDICARE

## 2024-11-14 ENCOUNTER — TRANSCRIBE ORDERS (OUTPATIENT)
Dept: CARDIOLOGY | Facility: HOSPITAL | Age: 74
End: 2024-11-14

## 2024-11-14 DIAGNOSIS — E78.5 HYPERLIPIDEMIA, UNSPECIFIED: ICD-10-CM

## 2024-11-14 DIAGNOSIS — R03.0 ELEVATED BLOOD-PRESSURE READING, WITHOUT DIAGNOSIS OF HYPERTENSION: ICD-10-CM

## 2024-11-14 DIAGNOSIS — R42 DIZZINESS AND GIDDINESS: ICD-10-CM

## 2024-11-14 DIAGNOSIS — I95.1 ORTHOSTATIC HYPOTENSION: ICD-10-CM

## 2024-11-14 DIAGNOSIS — E78.5 HYPERLIPIDEMIA, UNSPECIFIED: Primary | ICD-10-CM

## 2024-11-14 LAB
ALBUMIN SERPL-MCNC: 4.3 G/DL (ref 3.5–5.7)
ALP SERPL-CCNC: 62 IU/L (ref 34–125)
ALT SERPL-CCNC: 6 IU/L (ref 7–52)
AST SERPL-CCNC: 12 IU/L (ref 13–39)
BILIRUB DIRECT SERPL-MCNC: 0.1 MG/DL
BILIRUB SERPL-MCNC: 0.5 MG/DL (ref 0.3–1.2)
CHOLEST SERPL-MCNC: 160 MG/DL
HDLC SERPL-MCNC: 83 MG/DL
HDLC SERPL: 1.9 {RATIO}
LDLC SERPL CALC-MCNC: 61 MG/DL
NONHDLC SERPL-MCNC: 77 MG/DL
PROT SERPL-MCNC: 6.9 G/DL (ref 6–8.2)
TRIGL SERPL-MCNC: 79 MG/DL

## 2024-11-14 PROCEDURE — 80076 HEPATIC FUNCTION PANEL: CPT

## 2024-11-14 PROCEDURE — 36415 COLL VENOUS BLD VENIPUNCTURE: CPT

## 2024-11-14 PROCEDURE — 80061 LIPID PANEL: CPT

## 2024-12-10 ENCOUNTER — OFFICE VISIT (OUTPATIENT)
Dept: SURGERY | Facility: CLINIC | Age: 74
End: 2024-12-10
Payer: MEDICARE

## 2024-12-10 VITALS
SYSTOLIC BLOOD PRESSURE: 122 MMHG | BODY MASS INDEX: 22.63 KG/M2 | DIASTOLIC BLOOD PRESSURE: 78 MMHG | HEIGHT: 62 IN | RESPIRATION RATE: 18 BRPM | HEART RATE: 70 BPM | OXYGEN SATURATION: 98 % | WEIGHT: 123 LBS

## 2024-12-10 DIAGNOSIS — Z85.3 PERSONAL HISTORY OF ADENOCARCINOMA OF BREAST: Primary | ICD-10-CM

## 2024-12-10 DIAGNOSIS — R22.2 MASS OF CHEST WALL, LEFT: ICD-10-CM

## 2024-12-10 DIAGNOSIS — Z15.01 GENETIC PREDISPOSITION TO MALIGNANT NEOPLASM OF BREAST: ICD-10-CM

## 2024-12-10 PROCEDURE — 99213 OFFICE O/P EST LOW 20 MIN: CPT | Performed by: SURGERY

## 2024-12-10 NOTE — PROGRESS NOTES
Peggy Brody MD   [facility]  [address], [address], [facility] [address]  [phone]   Fax [phone]       Patient: Ruthy Kaminski  : 1950    Visit Date: 12/10/2024  Encounter Provider: Peggy Brody  Referring Provider: Jayme Donald DO    Reason for Visit:   Chief Complaint   Patient presents with    New pt / Breast check     S/P Bilat mastectomy  / Prior Dr. Larsen pt last note in media .        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 3 years later, she had left recurrence and new right DCIS, for which she underwent bilateral mastectomy. She was not treated with any adjuvant therapy. She underwent implant exchange in  with Dr. Pittman. She presents today for routine oncologic surveillance. She also sees Dr. Aguillon.    12/10/2024: Ruthy Kaminski is a 74 y.o. female without change in self breast examination or medical history since last seen in the office.    Past Medical History:   Diagnosis Date    Baker's cyst of knee, left 2018    Beta thalassemia (CMS/HCC)     Breast cancer (CMS/HCC)     Orthostatic hypertension     Osteoporosis        Past Surgical History   Procedure Laterality Date    Cataract extraction, bilateral      Eye surgery      Hysterectomy      Mastectomy      TILT TABLE N/A 2022    Performed by Steve Montanez MD at  CARDIAC CATH/EP/NEURO       Social History     Social History Narrative    Not on file       Family History   Problem Relation Name Age of Onset    Breast cancer Biological Mother      Thyroid cancer Biological Mother      Polymyalgia rheumatica Biological Mother      Cancer Biological Father      Heart disease Paternal Grandmother      Thyroid disease Paternal Grandfather      Cancer Paternal Grandfather    "      Allergies: Penicillin g, Cephalexin monohydrate, Levaquin [levofloxacin], and Sulfa (sulfonamide antibiotics)    Current Medications:  Current Outpatient Medications   Medication Instructions    atorvastatin (LIPITOR) 10 mg, Daily    meloxicam (MOBIC) 7.5 mg, Daily    midodrine (PROAMATINE) 2.5 mg    pantoprazole (PROTONIX) 40 mg, Daily PRN    traZODone (DESYREL) 100 mg, oral, Nightly    triamcinolone (KENALOG) 0.1 % cream 2 times daily       Physical Exam  Visit Vitals  /78 (BP Location: Left upper arm, Patient Position: Sitting)   Pulse 70   Resp 18   Ht 1.568 m (5' 1.73\")   Wt 55.8 kg (123 lb)   SpO2 98%   BMI 22.69 kg/m²      General: Awake, alert, in no acute distress or obvious discomfort.  Mobility: Ambulates and moves on and off exam table without difficulty or assistance. Requires minimal assistance moving between supine and sitting positions.  Lymph Nodes: No palpable cervical, supraclavicular, or axillary lymphadenopathy.  Tissue void in right axilla consistent with prior node biopsy.  Lungs: Respirations nonlabored. No tachypnea, cough, use of accessory respiratory muscles.  Breasts: Reconstructed breasts medium in size with mild ptosis.  Left reconstructed breast somewhat foreshortened compared to right.  Bilateral implant reconstruction with tattooed nipple and areola.  No change in slight telangiectasia left upper breast.  There is slight indentation just below the clavicle, which is stable.  There is firmness of the left subareolar and extending laterally soft tissue and skin which I suspect correlates to the calcification of her capsule seen on CT scan.      Labs  Lab Results   Component Value Date    WBC 5.26 09/12/2023    HGB 12.6 09/12/2023    HCT 40.7 09/12/2023    MCV 64.7 (L) 09/12/2023     09/12/2023       Lab Results   Component Value Date    GLUCOSE 85 09/12/2023    CALCIUM 10.0 09/12/2023     09/12/2023    K 4.2 09/12/2023    CO2 28 09/12/2023     09/12/2023 "    BUN 16 09/12/2023    CREATININE 0.7 09/12/2023       Lab Results   Component Value Date    ALT 6 (L) 11/14/2024    AST 12 (L) 11/14/2024    ALKPHOS 62 11/14/2024    BILITOT 0.5 11/14/2024       Pathology:  Cancer 2005 L invasive ductal ER/ME pos. node neg. 2008 L DCIS ER/ME pos. / R DCIS ER neg. ME pos. node neg.    Imaging  N/A      ASSESSMENT: BRCA1 positive.  History of bilateral breast cancer status postmastectomy with implant reconstruction.  Physical exam mostly stable except for increased thickness and firmness of left mid reconstructed breast tissue.    Diagnoses and all orders for this visit:    Personal history of adenocarcinoma of breast (Primary)  -     ULTRASOUND BREAST LIMITED LEFT; Future    Genetic predisposition to malignant neoplasm of breast    Mass of chest wall, left  -     ULTRASOUND BREAST LIMITED LEFT; Future          PLAN: Although I suspect this is reflective of calcifications seen on CT scan, ultrasound is ordered to further evaluate area in the left reconstructed breast of firm subcutaneous thickness.  The patient will be contacted by phone or via [email or web address] with any abnormal results requiring further evaluation.    Orders Placed This Encounter   Procedures    ULTRASOUND BREAST LIMITED LEFT       Return in about 1 year (around 12/10/2025).     Peggy Brody MD  12/10/2024

## 2024-12-12 ENCOUNTER — HOSPITAL ENCOUNTER (OUTPATIENT)
Dept: RADIOLOGY | Age: 74
Discharge: HOME | End: 2024-12-12
Attending: SURGERY
Payer: MEDICARE

## 2024-12-12 DIAGNOSIS — R22.2 MASS OF CHEST WALL, LEFT: ICD-10-CM

## 2024-12-12 DIAGNOSIS — Z85.3 PERSONAL HISTORY OF ADENOCARCINOMA OF BREAST: ICD-10-CM

## 2024-12-12 PROCEDURE — 76642 ULTRASOUND BREAST LIMITED: CPT | Mod: LT

## 2025-01-13 SDOH — ECONOMIC STABILITY: INCOME INSECURITY: IN THE LAST 12 MONTHS, WAS THERE A TIME WHEN YOU WERE NOT ABLE TO PAY THE MORTGAGE OR RENT ON TIME?: NO

## 2025-01-13 SDOH — ECONOMIC STABILITY: FOOD INSECURITY: WITHIN THE PAST 12 MONTHS, THE FOOD YOU BOUGHT JUST DIDN'T LAST AND YOU DIDN'T HAVE MONEY TO GET MORE.: NEVER TRUE

## 2025-01-13 SDOH — ECONOMIC STABILITY: FOOD INSECURITY: WITHIN THE PAST 12 MONTHS, YOU WORRIED THAT YOUR FOOD WOULD RUN OUT BEFORE YOU GOT MONEY TO BUY MORE.: NEVER TRUE

## 2025-01-13 SDOH — ECONOMIC STABILITY: TRANSPORTATION INSECURITY
IN THE PAST 12 MONTHS, HAS THE LACK OF TRANSPORTATION KEPT YOU FROM MEDICAL APPOINTMENTS OR FROM GETTING MEDICATIONS?: NO

## 2025-01-13 SDOH — ECONOMIC STABILITY: TRANSPORTATION INSECURITY
IN THE PAST 12 MONTHS, HAS LACK OF TRANSPORTATION KEPT YOU FROM MEETINGS, WORK, OR FROM GETTING THINGS NEEDED FOR DAILY LIVING?: NO

## 2025-01-13 ASSESSMENT — SOCIAL DETERMINANTS OF HEALTH (SDOH): IN THE PAST 12 MONTHS, HAS THE ELECTRIC, GAS, OIL, OR WATER COMPANY THREATENED TO SHUT OFF SERVICE IN YOUR HOME?: NO

## 2025-01-16 ENCOUNTER — OFFICE VISIT (OUTPATIENT)
Dept: FAMILY MEDICINE | Facility: CLINIC | Age: 75
End: 2025-01-16
Payer: MEDICARE

## 2025-01-16 VITALS
WEIGHT: 123.6 LBS | HEART RATE: 72 BPM | SYSTOLIC BLOOD PRESSURE: 136 MMHG | DIASTOLIC BLOOD PRESSURE: 82 MMHG | OXYGEN SATURATION: 99 % | BODY MASS INDEX: 23.33 KG/M2 | TEMPERATURE: 97.7 F | RESPIRATION RATE: 14 BRPM | HEIGHT: 61 IN

## 2025-01-16 DIAGNOSIS — Z17.1 MALIGNANT NEOPLASM OF LEFT BREAST IN FEMALE, ESTROGEN RECEPTOR NEGATIVE, UNSPECIFIED SITE OF BREAST (CMS/HCC): ICD-10-CM

## 2025-01-16 DIAGNOSIS — Z13.820 SCREENING FOR OSTEOPOROSIS: ICD-10-CM

## 2025-01-16 DIAGNOSIS — K21.9 GASTROESOPHAGEAL REFLUX DISEASE, UNSPECIFIED WHETHER ESOPHAGITIS PRESENT: ICD-10-CM

## 2025-01-16 DIAGNOSIS — R53.83 FATIGUE, UNSPECIFIED TYPE: ICD-10-CM

## 2025-01-16 DIAGNOSIS — R42 ORTHOSTATIC DIZZINESS: ICD-10-CM

## 2025-01-16 DIAGNOSIS — E78.00 HYPERCHOLESTEROLEMIA: ICD-10-CM

## 2025-01-16 DIAGNOSIS — Z00.00 MEDICARE ANNUAL WELLNESS VISIT, SUBSEQUENT: Primary | ICD-10-CM

## 2025-01-16 DIAGNOSIS — Z78.0 POST-MENOPAUSAL: ICD-10-CM

## 2025-01-16 DIAGNOSIS — C50.912 MALIGNANT NEOPLASM OF LEFT BREAST IN FEMALE, ESTROGEN RECEPTOR NEGATIVE, UNSPECIFIED SITE OF BREAST (CMS/HCC): ICD-10-CM

## 2025-01-16 DIAGNOSIS — D56.3 BETA THALASSEMIA TRAIT: ICD-10-CM

## 2025-01-16 DIAGNOSIS — R73.9 HYPERGLYCEMIA: ICD-10-CM

## 2025-01-16 DIAGNOSIS — F51.04 PSYCHOPHYSIOLOGICAL INSOMNIA: ICD-10-CM

## 2025-01-16 DIAGNOSIS — D50.9 MICROCYTIC ANEMIA: ICD-10-CM

## 2025-01-16 LAB
ERYTHROCYTE [DISTWIDTH] IN BLOOD BY AUTOMATED COUNT: 16.5 % (ref 11.7–14.4)
HCT VFR BLD AUTO: 37 % (ref 35–45)
HGB BLD-MCNC: 11.6 G/DL (ref 11.8–15.7)
MCH RBC QN AUTO: 20.1 PG (ref 28–33.2)
MCHC RBC AUTO-ENTMCNC: 31.4 G/DL (ref 32.2–35.5)
MCV RBC AUTO: 64 FL (ref 83–98)
PLATELET # BLD AUTO: 373 K/UL (ref 150–369)
PMV BLD AUTO: 9.8 FL (ref 9.4–12.3)
RBC # BLD AUTO: 5.78 M/UL (ref 3.93–5.22)
WBC # BLD AUTO: 6.4 K/UL (ref 3.8–10.5)

## 2025-01-16 PROCEDURE — 80053 COMPREHEN METABOLIC PANEL: CPT | Performed by: FAMILY MEDICINE

## 2025-01-16 PROCEDURE — G0439 PPPS, SUBSEQ VISIT: HCPCS | Performed by: FAMILY MEDICINE

## 2025-01-16 PROCEDURE — 80061 LIPID PANEL: CPT | Performed by: FAMILY MEDICINE

## 2025-01-16 PROCEDURE — 83540 ASSAY OF IRON: CPT | Performed by: FAMILY MEDICINE

## 2025-01-16 PROCEDURE — 36415 COLL VENOUS BLD VENIPUNCTURE: CPT | Performed by: FAMILY MEDICINE

## 2025-01-16 PROCEDURE — 82728 ASSAY OF FERRITIN: CPT | Performed by: FAMILY MEDICINE

## 2025-01-16 PROCEDURE — 85027 COMPLETE CBC AUTOMATED: CPT | Performed by: FAMILY MEDICINE

## 2025-01-16 PROCEDURE — 84443 ASSAY THYROID STIM HORMONE: CPT | Performed by: FAMILY MEDICINE

## 2025-01-16 PROCEDURE — 83036 HEMOGLOBIN GLYCOSYLATED A1C: CPT | Performed by: FAMILY MEDICINE

## 2025-01-16 RX ORDER — NITROFURANTOIN 25; 75 MG/1; MG/1
100 CAPSULE ORAL 2 TIMES DAILY
Qty: 14 CAPSULE | Refills: 0 | Status: SHIPPED | OUTPATIENT
Start: 2025-01-16 | End: 2025-01-23

## 2025-01-16 RX ORDER — OMEPRAZOLE 40 MG/1
40 CAPSULE, DELAYED RELEASE ORAL 2 TIMES DAILY
COMMUNITY
Start: 2024-10-21

## 2025-01-16 NOTE — PATIENT INSTRUCTIONS
Your Personalized Prevention Plan Services (PPPS)    Preventive Services Checklist (Assumes Average Risk Unless Otherwise Noted):    Health Maintenance Topics with due status: Overdue       Topic Date Due    Depression Screening Never done    DTaP, Tdap, and Td Vaccines Never done    RSV Vaccine Never done    Medicare Annual Wellness Visit 09/14/2023    COVID-19 Vaccine 09/01/2024    DEXA Scan 10/18/2024     Health Maintenance Topics with due status: Not Due       Topic Last Completion Date    Falls Risk Screening 01/13/2025     Health Maintenance Topics with due status: Completed       Topic Last Completion Date    Hepatitis C Screening 06/03/2016    Pneumococcal (65 years and older) 06/03/2016    Zoster Vaccine 10/22/2020    Influenza Vaccine 10/17/2024     Health Maintenance Topics with due status: Aged Out       Topic Date Due    Meningococcal ACWY Aged Out    RSV <20 months Aged Out    HIB Vaccines Aged Out    Hepatitis B Vaccines Aged Out    IPV Vaccines Aged Out    Meningococcal B Aged Out    HPV Vaccines Aged Out     Health Maintenance Topics with due status: Discontinued       Topic Date Due    Colorectal Cancer Screening Discontinued       You May Be Eligible for These Additional Preventive Services   (Assumes Average Risk Unless Otherwise Noted)  Diabetes Screening Any 1 risk factor: hypertension, dyslipidemia, obesity, high glucose; or Any 2 risk factors: >=64yo, overweight, family history diabetes (covered every 6 months)   Hepatitis C Screening Any 1 risk factor: 1) blood transfusion before 1992,   2) current or past injection drug use (annually for high risk; if born between 7[birth year]9864, see above for status).   Vaccine: Hepatitis B As necessary if at-risk: hemophilia, ESRD, diabetes, living with individual infected with hep B, healthcare worker with frequent contact with blood/bodily fluids (series covered once)   Sexually Transmitted Diseases (STDs) As necessary chlamydia,  gonorrhea, syphilis, hepatitis B (covered annually)  HIV if any 1 risk factor present: 1) <14yo or >64yo and at increased risk or 2) 15-64yo and ask for it (covered annually)   Lung Cancer Screening Low dose chest CT if all three risk factors: 1) 50-78yo, 2) smoker or quit within last 15y, 3) >=20 pack years (covered annually).  No results found for this or any previous visit.       Cholesterol Screening Both risk factors: 1) >=19yo and 2)  increased risk coronary artery disease (covered every 5 years).   Breast Cancer Screening Covered once 35-40yo, annually >=39yo (if >=51yo, see above for status).       Health Risk Factors with Personalized Education:  ----------------------------------------------------------------------------------------------------------------------  Controlling Your Cholesterol  Reduce the amount of saturated and trans fat in your diet.  Limit intake of red meat.  Consume only low-fat or non-fat/skim dairy.  Limit fried food.  Cook with vegetable oils.  Reduce your intake of sugary foods, sugary drinks and alcohol.  Eat a diet high in fruit, vegetables and whole grains.  Get protein from fish, poultry and a small portion of nuts.  Stay active.  Try to get at least 90 to 150 minutes of exercise per week.  Try brisk walking, swimming, bicycling or dancing.  Maintain a healthy weight by balancing your diet and exercise.  If you have been prescribed medication, take it regularly and exactly as prescribed. Let your PCP know if you have any problems or questions about your medication.  It’s important to know your cholesterol numbers.  When recommended by your PCP, get the cholesterol blood test.  ----------------------------------------------------------------------------------------------------------------------  Maintaining Strong Bones  Try to get at least 90 to 150 minutes of weight-bearing exercise per week.  Ensure intake of at least 1200mg of calcium per day.  Eat foods high in calcium like  milk and other dairy, green vegetables, fruit, canned fish with soft and edible bones, nuts, calcium-set tofu.  Some foods are calcium-fortified, like bread, cereal, fruit juices and mineral water.  Help your body make vitamin D by getting 10-15 minutes per day of sunlight.    Ensure intake of at least 600IU of vitamin D per day.  Eat foods high in vitamin D like oily fish (salmon, sardines, mackerel) and eggs.  Some foods are fortified with vitamin D, like dairy and cereals.  Avoid high amounts of caffeine and salt, since they can cause the body to loose calcium.  Limit alcohol intake, since it is associated with weaker bones and is associated with falls and fractures.  Limit intake of fizzy drinks.  ----------------------------------------------------------------------------------------------------------------------  Reducing Your Risk of Falls  Tell your PCP if any of your medications make you feel tired, dizzy, lightheaded or off-balance.  Maintain coordination, flexibility and balance by ensuring regular physical activity.  Limit alcohol intake to 1 drink per day.  Consider avoiding all alcohol intake.  Ensure good vision.  Visit an ophthalmologist or optometrist regularly for vision screening or to make sure your glasses / contact lens prescription is correct.  If you need glasses or contacts, wear them.  When you get new glasses or contacts, take time to get used to them.  Do not wear sunglasses or tinted lenses when indoors.  Ensure good hearing.  Have your hearing checked if you are having trouble hearing, or family and friends think you cannot hear them.  If you need a hearing aid, be sure it fits well and wear it.  Get enough rest.  Ensure about 7-9 hours of sleep every day.  Get up slowly from your bed or chairs.  Do not start walking until you are sure you feel steady.  Wear non-skid, rubber-soled, low-heeled shoes.  Do not walk in socks, or in shoes and slippers with smooth soles.  If your PCP or  therapist recommends using a cane or walker, use it regularly.  Make your home safer.  Increase lighting throughout the house, especially at the top and bottom of stairs.  Ensure lighting is easily turned on when getting up in the middle of the night.  Make sure there are two secure rails on all stairs.  Install grab bars in the bathtub / shower and near the toilet.  Consider using a shower chair and / or a hand-held shower.  Spread sand or salt on icy surfaces.  Beware of wet surfaces, which can be icy.  Tell your PCP if you have fallen.

## 2025-01-16 NOTE — PROGRESS NOTES
Subjective patient is here for Medicare wellness exam, history of breast cancer, GERD, insomnia, does complain of some fatigue.  History of orthostatic hypotension follows with cardiology    Ruthy Kaminski is a 74 y.o. female who presents for a subsequent annual wellness visit.     Patient Care Team:  Jayme Donald DO as PCP - General (Family Medicine)  Marco Antonio Aguillon DO as Consulting Physician (Hematology and Oncology)  Sydni Bernardo, [facility]  Melissa Doshi DO as Consulting Physician (Gastroenterology)  Seymour Oliver DO as Consulting Physician (Gastroenterology)    Comprehensive Medical and Social History  Patient Active Problem List   Diagnosis    Malignant neoplasm of breast (CMS/HCC)    Beta thalassemia trait    Neck pain    Baker's cyst of knee, left    Insomnia    Chronic low back pain    Gastroesophageal reflux disease    Orthostatic dizziness    Hypercholesterolemia    Hyperglycemia     Past Medical History:   Diagnosis Date    Baker's cyst of knee, left 09/19/2018    Beta thalassemia (CMS/HCC)     Breast cancer (CMS/HCC)     Orthostatic hypertension 2022    Osteoporosis      Past Surgical History   Procedure Laterality Date    Cataract extraction, bilateral  2024    Eye surgery      Hysterectomy      Mastectomy      TILT TABLE N/A 9/19/2022    Performed by Steve Montanez MD at  CARDIAC CATH/EP/NEURO     Allergies   Allergen Reactions    Penicillin G     Cephalexin Monohydrate Rash     KEFLEX    Levaquin [Levofloxacin] Palpitations    Sulfa (Sulfonamide Antibiotics) Rash     Current Outpatient Medications   Medication Sig Dispense Refill    nitrofurantoin, macrocrystal-monohydrate, (MACROBID) 100 mg capsule Take 1 capsule (100 mg total) by mouth 2 (two) times a day for 7 days. 14 capsule 0    omeprazole (PriLOSEC) 40 mg capsule Take 40 mg by mouth 2 (two) times a day.      atorvastatin (LIPITOR) 10 mg tablet Take 10 mg by mouth daily.      meloxicam (MOBIC) 7.5 mg tablet Take 7.5  "mg by mouth daily.      traZODone (DESYREL) 100 mg tablet Take 1 tablet (100 mg total) by mouth nightly. 90 tablet 3     No current facility-administered medications for this visit.     Social History     Tobacco Use    Smoking status: Never    Smokeless tobacco: Never   Vaping Use    Vaping status: Never Used   Substance Use Topics    Alcohol use: Yes     Comment: socially    Drug use: No     Family History   Problem Relation Name Age of Onset    Breast cancer Biological Mother      Thyroid cancer Biological Mother      Polymyalgia rheumatica Biological Mother      Cancer Biological Father      Heart disease Paternal Grandmother      Thyroid disease Paternal Grandfather      Cancer Paternal Grandfather         Objective   Vitals  Vitals:    01/16/25 0827   BP: 136/82   BP Location: Left upper arm   Patient Position: Sitting   Pulse: 72   Resp: 14   Temp: 36.5 °C (97.7 °F)   TempSrc: Temporal   SpO2: 99%   Weight: 56.1 kg (123 lb 9.6 oz)   Height: 1.549 m (5' 1\")     Body mass index is 23.35 kg/m².    Advanced Care Plan                                        PHQ                                                            Mini Cog   Patient deferred      Get Up and Go       STEADI Falls Risk                                                                Hearing and Vision Screening  No results found.  See HRA for relevant hearing screening response.    Diet and Exercise            Assessment/Plan   Diagnoses and all orders for this visit:    Medicare annual wellness visit, subsequent (Primary)    Malignant neoplasm of left breast in female, estrogen receptor negative, unspecified site of breast (CMS/HCC)    Beta thalassemia trait    Gastroesophageal reflux disease, unspecified whether esophagitis present    Psychophysiological insomnia    Hyperglycemia  -     Hemoglobin A1c    Hypercholesterolemia  -     CBC  -     Comprehensive metabolic panel  -     Lipid panel    Fatigue, unspecified type  -     TSH 3rd " Generation    Screening for osteoporosis  -     DEXA BONE DENSITY; Future    Post-menopausal  -     DEXA BONE DENSITY; Future    Orthostatic dizziness    Other orders  -     nitrofurantoin, macrocrystal-monohydrate, (MACROBID) 100 mg capsule; Take 1 capsule (100 mg total) by mouth 2 (two) times a day for 7 days.    Check blood work for routine screening, check DEXA scan for screening, previously osteopenia, following with GI regarding GERD and oncology for history of breast cancer.    See Patient Instructions (the written plan) which was given to the patient for PPPS and health risk factors with interventions.

## 2025-01-17 DIAGNOSIS — D50.9 MICROCYTIC ANEMIA: Primary | ICD-10-CM

## 2025-01-17 LAB
ALBUMIN SERPL-MCNC: 4.3 G/DL (ref 3.5–5.7)
ALP SERPL-CCNC: 85 IU/L (ref 34–125)
ALT SERPL-CCNC: 6 IU/L (ref 7–52)
ANION GAP SERPL CALC-SCNC: 8 MEQ/L (ref 3–15)
AST SERPL-CCNC: 13 IU/L (ref 13–39)
BILIRUB SERPL-MCNC: 0.5 MG/DL (ref 0.3–1.2)
BUN SERPL-MCNC: 16 MG/DL (ref 7–25)
CALCIUM SERPL-MCNC: 10 MG/DL (ref 8.6–10.3)
CHLORIDE SERPL-SCNC: 103 MEQ/L (ref 98–107)
CHOLEST SERPL-MCNC: 152 MG/DL
CO2 SERPL-SCNC: 30 MEQ/L (ref 21–31)
CREAT SERPL-MCNC: 0.7 MG/DL (ref 0.6–1.2)
EGFRCR SERPLBLD CKD-EPI 2021: >60 ML/MIN/1.73M*2
EST. AVERAGE GLUCOSE BLD GHB EST-MCNC: 108 MG/DL
FERRITIN SERPL-MCNC: 106 NG/ML (ref 11–250)
GLUCOSE SERPL-MCNC: 92 MG/DL (ref 70–99)
HBA1C MFR BLD: 5.4 %
HDLC SERPL-MCNC: 70 MG/DL
HDLC SERPL: 2.2 {RATIO}
IRON SATN MFR SERPL: 25 % (ref 15–45)
IRON SERPL-MCNC: 80 UG/DL (ref 35–150)
LDLC SERPL CALC-MCNC: 68 MG/DL
NONHDLC SERPL-MCNC: 82 MG/DL
POTASSIUM SERPL-SCNC: 4.6 MEQ/L (ref 3.5–5.1)
PROT SERPL-MCNC: 7.3 G/DL (ref 6–8.2)
SODIUM SERPL-SCNC: 141 MEQ/L (ref 136–145)
TIBC SERPL-MCNC: 316 UG/DL (ref 270–460)
TRIGL SERPL-MCNC: 69 MG/DL
TSH SERPL DL<=0.05 MIU/L-ACNC: 2.16 MIU/L (ref 0.34–5.6)
UIBC SERPL-MCNC: 236 UG/DL (ref 180–360)

## 2025-01-27 ENCOUNTER — HOSPITAL ENCOUNTER (OUTPATIENT)
Dept: RADIOLOGY | Age: 75
Discharge: HOME | End: 2025-01-27
Attending: FAMILY MEDICINE
Payer: MEDICARE

## 2025-01-27 DIAGNOSIS — Z13.820 SCREENING FOR OSTEOPOROSIS: ICD-10-CM

## 2025-01-27 DIAGNOSIS — Z78.0 POST-MENOPAUSAL: ICD-10-CM

## 2025-01-27 PROCEDURE — 77080 DXA BONE DENSITY AXIAL: CPT

## 2025-01-28 NOTE — RESULT ENCOUNTER NOTE
D/w pt Lumbar and Femoral stable, forearm shows osteoporosis. D/w pt treatment options, side effects - pt wishes to continue Vit d supplement and calcium

## 2025-03-03 ENCOUNTER — TRANSCRIBE ORDERS (OUTPATIENT)
Dept: REGISTRATION | Facility: REHABILITATION | Age: 75
End: 2025-03-03

## 2025-03-03 DIAGNOSIS — R26.81 UNSTEADINESS ON FEET: Primary | ICD-10-CM

## 2025-03-13 ENCOUNTER — HOSPITAL ENCOUNTER (OUTPATIENT)
Dept: PHYSICAL THERAPY | Facility: REHABILITATION | Age: 75
Setting detail: THERAPIES SERIES
Discharge: HOME | End: 2025-03-13
Attending: PSYCHIATRY & NEUROLOGY
Payer: MEDICARE

## 2025-03-13 DIAGNOSIS — R42 DIZZINESS: ICD-10-CM

## 2025-03-13 DIAGNOSIS — R26.81 UNSTEADINESS ON FEET: ICD-10-CM

## 2025-03-13 DIAGNOSIS — Z74.09 IMPAIRED FUNCTIONAL MOBILITY, BALANCE, GAIT, AND ENDURANCE: Primary | ICD-10-CM

## 2025-03-13 PROCEDURE — 97163 PT EVAL HIGH COMPLEX 45 MIN: CPT | Mod: GP

## 2025-03-13 PROCEDURE — 97530 THERAPEUTIC ACTIVITIES: CPT | Mod: GP

## 2025-03-13 ASSESSMENT — GAIT ASSESSMENTS
TUG TIME: 8.18
TUG TIME: 12.39

## 2025-03-13 NOTE — LETTER
Dear DR. Saravia,    Thank you for this referral. Please review the attached notes and plan of care for your approval.  Please contact our department with any questions.     Sincerely,     Live Agrawal, PT  [address] [place] [address]  Phone [phone]  Fax  [phone]    By co-signing this Plan of Care (POC) you agree to the following:  I have reviewed the the Plan of Care established by the therapist within this document and certify that the services are skilled and medically necessary. I have reviewed the plan and recommend that these services continue to meet the goals stated in this document.    PHYSICIAN SIGNATURE: __________________________________     DATE: ___________________  TIME: _____________           Physical Therapy Plan of Care 3/13/25   Effective from: 3/13/2025  Effective to: 2025    Plan ID: [id number]            Participants as of Finalize on 3/13/2025    Name Type Comments Contact Info    Raoul Saravia DO Referring Provider  [phone]    Live Agrawal, PT Physical Therapist         Last Progress Notes Note     Author: Live Agrawal PT Status: Signed Last edited: 3/13/2025  9:00 AM       Physical Therapy Evaluation    [facility] Fax: [phone]    PT EVALUATION FOR OUTPATIENT THERAPY    Patient: Ruthy Kaminski    MRN: [id number]  : 1950 74 y.o.     Referring Physician: Raoul Saravia DO  Date of Visit: 3/13/2025      Certification Dates:  25 through 25         Recommended Frequency & Duration:  2 times/week for up to 3 months     Diagnosis:   1. Impaired functional mobility, balance, gait, and endurance    2. Unsteadiness on feet    3. Dizziness        Chief Complaints:   Chief Complaint   Patient presents with    Difficulty Walking    Balance Deficits    Dizziness       Precautions: fall, cardiac  Precautions additional comments:      Past Medical History:   Past Medical History:   Diagnosis Date    Baker's cyst of knee,  left 09/19/2018    Beta thalassemia (CMS/HCC)     Breast cancer (CMS/[facility])     Orthostatic hypertension 2022    Osteoporosis        Past Surgical History:   Past Surgical History   Procedure Laterality Date    Cataract extraction, bilateral  2024    Eye surgery      Hysterectomy      Mastectomy      TILT TABLE N/A 9/19/2022    Performed by Steve Montanez MD at  CARDIAC CATH/EP/NEURO         LEARNING ASSESSMENT    Assessment completed:  Yes    Learner name:  Ruthy Kaminski    Learner: Patient    Learning Barriers:  Learning barriers: No Barriers    Preferred Language: English     Needed: No    Education Provided:   Method: Discussion and Handout  Readiness: acceptance and eager  Response: Verbalizes understanding      CO-LEARNER ASSESSMENT:    Completed: No      Welcome letter discussed: Yes Patient provided with Welcome Letter, which includes attendance policy. Provided education regarding cancellation and no-show policy. Education regarding the importance of participation and regular attendance to maximize goal attainment.       OBJECTIVE MEASUREMENTS/DATA:    Time In Session:  Start Time: 0900  Stop Time: 1000  Time Calculation (min): 60 min   Assessment and Plan - 03/13/25 1218          Assessment    Plan of Care reviewed and patient/family in agreement Yes     System Pathology/Pathophysiology Noted musculoskeletal;neuromuscular;vestibular     Functional Limitations in Following Categories (PT Eval) self-care;home management;community/leisure     Rehab Potential/Prognosis good, to achieve stated therapy goals     Problem List decreased strength;impaired sensory feedback;decreased endurance;dizziness;impaired balance;pain     Clinical Assessment Pt is a 73 yo female with h/h orthostatic hypotension, who is referred to OPPT for imbalance.  Pt reports that she experiences dizziness with supine to sit, sit to stand, bend forward with return to upright, and looking up to ceiling, as well as  when in busy environments with head moving side to side.  Orthostatic response to position change has been documented by cardiologist and neurologist, but symptoms are not well controlled at this time.  Pt reports she has been more unbalanced, and has been unable to perform yoga as a result.  Today, pt demonstrates decreased proximal strength of R>L LE via MMT, and she reports decreased strength in her hands.  She scores as a borderline falls risk based on FGA score of 21/30 (<22/30 inc risk for falls), and had most difficulty on items involving head movement, eyes closed and narrow base of support.  Pt also demonstrates significant difficulty with reactive balance and dual task challenge on miniBEST test, which also place her at an increased risk for falls.  Pt is motivated, and has a very active social life, so she is eager to improve her current status.  Pt is a good candidate for OPPT, and would benefit from skilled intervention to aide in improving c/o dizziness, and cardiovascular conditioning, strength, balance and endurance to maximize safety, and allow for a return to PLOF.     Plan and Recommendations Initiate OPPT.  Assess vestibular-ocular eval, Frenzel exam, mMST, SOT, 30 sec STS, 6MWT.     Planned Services CPT 46094 Gait training;CPT 53522 Neuromuscular Reeducation;CPT 86729 Self-care/Home management training;CPT 89653 Therapeutic exercises;CPT 53850 Electrical stimulation ATTENDED;CPT 60309 Hot/Cold Packs therapy;CPT 00717 Canalith repositioning procedure/maneuvers;CPT 47216 Manual therapy;CPT 46344 Orthotics training (Initial encounter);CPT 28731 Orthotics/Prosthetics management and training (Subsequent encounters);CPT 78708 Therapeutic activities;CPT 56794 Electrical stimulation UNATTENDED                    General Information - 03/13/25 8607          Session Details    Document Type initial evaluation     Mode of Treatment individual therapy        General Information    Referring Physician   Raoul Saravia     History of present illness/functional impairment Pt is a 75 yo female with h/o BRCA1 breast cancer, B mastectomy with reconstruction, GERD, HTN, and orthostatic dizziness, who presents with diagnosis of imbalance.  Pt reports her biggest issue is dizziness related to orthostatic hypotension- has tried midodrine, and is followed by cardiology.  Neurology confirmed orthostatic hypotension with related imbalance, and referred pt to PT to improve her balance and safety.  Pt has limited function in her hands due to arthritis.  Pt notes her balance is worse- notices it during yoga, as she cannot hold the poses she is used to being able to complete.  She also walks 2.5-3 miles daily without issue.  ENT (Lakshmi) has checked her inner ear, and cleared her for vestibular or BPPV issues, but notes some visual motion sensitivity in stores, with excessive head turning that exacerbates dizziness.  Pt experiences severe low back pain, right across top of pelvis.     Patient/Family/Caregiver Comments/Observations When patient stands abruptly, she feels she is spinning.  She has to hold on for a few seconds- its worse if she raises her arms into flexion overhead.  If she bends down and gets up, she spins.  If she looks straight up at the ceiling, she spins.  Neurology did note (+) drop in BP with orthostatic assessment.  Has looked into salt tabs, but is unsure of correct brand.  Tried compression socks with zipper, but they hurt.  Pt denies numbness and tingling in feet.  Pt denies falls.     Existing Precautions/Restrictions fall;cardiac         Services    Do You Speak a Language Other Than English at Home? no        Behavioral Health Related Communication    Suicidal Ideation No                    Pain/Vitals - 03/13/25 0844          Pain Assessment    Currently in pain No/Denies        Pre Activity Vital Signs    Pulse 77     SpO2 95 %     /70     BP Location Left upper arm     BP Method  Manual     Patient Position Sitting                    Falls/Food Screening - 03/13/25 0844          Initial Falls Assessment    One or more falls in the last year No        Food Insecurity    Within the past 12 months, you worried that your food would run out before you got the money to buy more. Never true     Within the past 12 months, the food you bought just didn't last and you didn't have money to get more. Never true                    PT - 03/13/25 0844          Physical Therapy    Physical Therapy Specialty Traditional Neuro PT        PT Plan    Frequency of treatment 2 times/week     PT Duration 3 months     PT Cert From 03/13/25     PT Cert To 06/11/25     Date PT POC was sent to provider 03/13/25                   Living Environment    Living Environment - 03/13/25 0844          Living Environment    People in Home spouse     Living Arrangements house     Living Environment Comment 55+ community, first floor living     Transportation Concerns none        Relationship/Environment    Name(s) of People in Home , Rosey                   PLOF:    Prior Level of Function - 03/13/25 0844          OTHER    Previous level of function Independent, does yoga, walks regularly.  Pt has an active social life.                   Sensory Tests    Sensory Testing - 03/13/25 0844          Sensory Assessment    Sensory Assessment sensation intact, lower extremities   LT awareness, localization and proprioception intact B LE                  Skin    Skin Assessment - 03/13/25 0844          LE Skin    Skin Condition Intact                   MMT    Manual Muscle Tests - 03/13/25 0844          RIGHT: Upper Extremity Manual Muscle Test Assessment    Shoulder Flexion gross movement (5/5) normal     Shoulder Abduction gross movement (5/5) normal     Shoulder Internal Rotation gross movement (5/5) normal     Shoulder External Rotation gross movement (5/5) normal     Elbow Flex gross movement (5/5) normal     Elbow Extension  gross movement (4+/5) good plus        LEFT: Upper Extremity Manual Muscle Test Assessment    Shoulder Flexion gross movement (5/5) normal     Shoulder Abduction gross movement (5/5) normal     Shoulder Internal Rotation gross movement (5/5) normal     Shoulder External Rotation gross movement (5/5) normal     Elbow Flexion gross movement (5/5) normal     Elbow Extension gross movement (4+/5) good plus        RIGHT: Lower Extremity Manual Muscle Test Assessment    Hip Flexion gross movement (3+/5) fair plus     Hip Extension gross movement (4-/5) good minus     Hip Abduction gross movement (4-/5) good minus     Knee Flexion strength (4/5) good     Knee Extension strength (4+/5) good plus     Ankle Dorsiflexion gross movement (5/5) normal     Ankle Plantarflexion gross movement (5/5) normal     Ankle Inversion gross movement (5/5) normal     Ankle Eversion gross movement (5/5) normal        LEFT: Lower Extremity Manual Muscle Test Assessment    Hip Flexion gross movement (4/5) good     Hip Extension gross movement (4-/5) good minus     Hip Abduction gross movement (4-/5) good minus     Knee Flexion strength (4+/5) good plus     Knee Extension strength (5/5) normal     Ankle Dorsiflexion gross movement (5/5) normal     Ankle Plantarflexion gross movement (5/5) normal     Ankle Inversion gross movement (5/5) normal     Ankle Eversion gross movement (5/5) normal                   Transfers    Transfers - 03/13/25 0844          Bed Mobility    Bed Mobility Activities supine to sit;sit to supine;rolling     Terral independent        Sit to Stand Transfer    Terral, Sit to Stand Transfer modified independence        Stand to Sit Transfer    Terral, Stand to Sit Transfer modified independence        Stand Pivot Transfer    Terral, Stand Pivot/Stand Step Transfer independent                   Gait and Mobility    Gait and Mobility - 03/13/25 0844          Gait Training    Terral, Gait  independent     Variable surfaces Flat surface;Ramp     Pattern step-through     Deviations/Abnormal Patterns --   path deviation noted with head movement                  Neuromuscular/Motor    Neuromuscular/Motor - 03/13/25 0844          Motor Skills    Motor Skills coordination;muscle tone     Coordination bilateral;upper extremity;lower extremity     Comment: Coordination ROBSON of UE and LE intact     Muscle Tone bilateral;lower extremity(s);WNL                   Outcome Measures    PT Outcome Measures - 03/13/25 0844          Objective Outcome Measures    Gait Speed (m/sec) 1.1 m/sec   no AD    FGA Score (out of 30 total) 21   difficulty with HN>HT, tandem, EC, retro (2,3,2,2,3,3,1,1,2,2)       Other Outcome Measures Used/Comments    Other outcome measure used: miniBEST TEST: 21/28 (2,2,1,1,1,0,2,2,2,2,1,2,2,1)        Timed Up and Go Test    Trial One: Timed Up and Go Test 8.18     Trial Two: Timed Up and Go Test 12.39   COG TUG: counting back by 2 from 100, unable to continue counting once turn completed    Results, Timed Up and Go Test (Balance) from red chair, no AD                      Goals          Patient Stated      PT PSFS (pt-stated)       Improve balance         Other      Mutually agreed upon pain goal       Mutually agreed upon pain goal: Pt will not report increase in low back pain as a result of PT intervention.        PT Goals       Short Term Goals Time Frame Result Comment/Progress   Pt will complete ACTIVE STAND TEST 4-6 weeks     Pt will complete the 30 second sit to stand test. (Age norm: Female Age 75-79 - 13 reps) 4-6 weeks     Pt will improve by >=4 points on the FGA to improve mobility and reduce fall risk.  (FGA fall risk </=22; MDC = 4 points) 4-6 weeks     Pt will complete the 6MWT to assess endurance with household mobility.  (Age norm: Female Age 70-79 - 1545ft  (471m)   ) 4-6 weeks     Patient will perform HEP with supervision level assistance. 4-6 weeks     Patient will complete  mMST, SOT and vestibular-ocular assessment/frenzel exam. 4-6 weeks     Patient will improve by >/= 3 points on the Mini-BESTest to demonstrate improvement functional balance.  (Mini-BESTest fall risk </=17, MDC = 6 points) 4-6 weeks       Long Term Goals Time Frame Result Comment/Progress   Pt will report minimal to no dizziness with sit to stand/transitions 8-12 weeks     Pt will score >=28/30 on the FGA to improve stability and reduce fall risk.  (FGA fall risk </=22; MDC = 4 points) 8-12 weeks     Pt will further improve by 269ft or meet/exceed the age norm on the 6MWT to improve endurance with household and community mobility.  (Age norm: Female Age 70-79 - 1545ft  (471m)   ) 8-12 weeks     Patient will perform home/community exercise program independently.   8-12 weeks     Patient will score >/=27/28 on the Mini-BESTest to demonstrate decreased risk of falls.  (Mini-BESTest fall risk </=17, MDC = 6 points) 8-12 weeks     Patient will be able to tolerate >=20 minutes of moderate (Jesse 11-14/20) cardiovascular conditioning with vital signs stable to improve endurance with household and community mobility. 8-12 weeks     Patient to meet or exceed age-norm score on the 30s STS test to demonstrate improvement in functional LE strength (Age norm: Female Age 75-79 - 13 reps) 8-12 weeks                           TREATMENT PLAN:      PT Neuro Exercises Current Session  Performed Today? (y/n)   THER ACT   (CPT 58264) TOTAL TIME FOR SESSION 8-22 min    Pain, vitals, subjective See flow yes   Orthostatic VS Supine 124/72  HR 75  Seated 120/72  HR 67 dizzy x15 sec  Stand 100/68 HR 66 brief dizzy  Stand x1 min 120/80 HR 67 no sx yes   Patient Education/HEP Pt educated on outcomes of testing, goals, plan for upcoming visits, POC and scheduling.  Pt verbalized understanding. yes   Bed Mobility     Transfer training     Subjective Outcomes Measures     ABC Scale assess    PSFS assessed yes   SELF CARE/HOME MANAGEMENT TRAINING  (55097) TOTAL TIME FOR SESSION          THER EX  (CPT 28104) TOTAL TIME FOR SESSION     Education/HEP     STRETCHING     Stretching by patient     Stretching by therapist/PROM     CARDIOVASCULAR     Nu Step     Stationary Bike     Treadmill     Endurance Testing     Active Stand Test assess    6mWT assess    STRENGTH TRAINING     Standing Ther-Ex     Side-lying there-ex     Supine Ther-Ex     Prone Ther-Ex     Core exercises     Functional Strength Testing     30 sec STS test (60y +) assess    NEURO RE-ED  (CPT 76140) TOTAL TIME FOR SESSION     Education/HEP     VESTIBULAR Vestibular-ocular eval    SOT assess    MSQ Assess mMST    Adaptation     Compensation     Habiutation     COORDINATION     Target Tapping     Coordination ladder     POSTURAL RE-ED     Seated & standing reaching for trunk strengthening     Lizett-scapular strengthening     PRE-GAIT ACTIVITIES      Tap-ups     Step-ups     Standing weight shifting     Step over/back     BALANCE TRAINING     Standing balance - static/dynamic     Airex     Rockerboard     Hurdles     Split Stance     Bosu     Reactive Balance     Slip      Dynamic gait      Side stepping/grapevine     Retro walking     Tandem Walking     Gait with eyes closed     Walking with ball toss     Balance Testing     miniBEST Test     FGA     TUG/COG TUG     10mWT     GAIT TRAINING  (CPT 29710) TOTAL TIME FOR SESSION     Education/HEP     Ambulation without AD     Stair negotiation     Curb negotiation     Ramp negotiation     Outdoor ambulation     BWS/vector training     MANUAL  (CPT 75895) TOTAL TIME FOR SESSION     Mobilization      MODALITIES TOTAL TIME FOR SESSION     Ice     Heat     ATTENDED E-STIM  (CPT 00298) TOTAL TIME FOR SESSION     Attended E-Stim     Unattended E-stim       ORTHOTICS TRAINING   TOTAL TIME FOR SESSION     Initial Encounter (71760)     Subsequent Encounter ([id number])           ASSESSMENT:    This 74 y.o. year old female presents to PT with above stated  diagnosis. Physical Therapy evaluation reveals decreased strength, impaired sensory feedback, decreased endurance, dizziness, impaired balance, pain resulting in self-care, home management, community/leisure limitations. Ruthy Kaminski will benefit from skilled PT services to address limitation, work towards rehab and patient goals and maximize PLOF of chosen ADLs.     Planned Services: The patient's treatment will include CPT 93538 Gait training, CPT 03633 Neuromuscular Reeducation, CPT 95160 Self-care/Home management training, CPT 89554 Therapeutic exercises, CPT 73762 Electrical stimulation ATTENDED, CPT 96168 Hot/Cold Packs therapy, CPT 23439 Canalith repositioning procedure/maneuvers, CPT 24029 Manual therapy, CPT 47669 Orthotics training (Initial encounter), CPT 41482 Orthotics/Prosthetics management and training (Subsequent encounters), CPT 71966 Therapeutic activities, CPT 69606 Electrical stimulation UNATTENDED, .     Live Agrawal PT                           Current Participants as of 3/13/2025    Name Type Comments Contact Info    Raoul Saravia DO Referring Provider  [phone]    Signature pending    Live Agrawal PT Physical Therapist      Electronically signed by Live Agrawal PT at 3/13/2025 8618 EDT

## 2025-03-13 NOTE — PROGRESS NOTES
Physical Therapy Evaluation    [facility] Fax: [phone]    PT EVALUATION FOR OUTPATIENT THERAPY    Patient: Ruthy Kaminski    MRN: [id number]  : 1950 74 y.o.     Referring Physician: Raoul Saravia DO  Date of Visit: 3/13/2025      Certification Dates:  25 through 25         Recommended Frequency & Duration:  2 times/week for up to 3 months     Diagnosis:   1. Impaired functional mobility, balance, gait, and endurance    2. Unsteadiness on feet    3. Dizziness        Chief Complaints:   Chief Complaint   Patient presents with    Difficulty Walking    Balance Deficits    Dizziness       Precautions: fall, cardiac  Precautions additional comments:      Past Medical History:   Past Medical History:   Diagnosis Date    Baker's cyst of knee, left 2018    Beta thalassemia (CMS/HCC)     Breast cancer (CMS/HCC)     Orthostatic hypertension     Osteoporosis        Past Surgical History:   Past Surgical History   Procedure Laterality Date    Cataract extraction, bilateral      Eye surgery      Hysterectomy      Mastectomy      TILT TABLE N/A 2022    Performed by Steve Montanez MD at  CARDIAC CATH/EP/NEURO         LEARNING ASSESSMENT    Assessment completed:  Yes    Learner name:  Ruthy Kaminski    Learner: Patient    Learning Barriers:  Learning barriers: No Barriers    Preferred Language: English     Needed: No    Education Provided:   Method: Discussion and Handout  Readiness: acceptance and eager  Response: Verbalizes understanding      CO-LEARNER ASSESSMENT:    Completed: No      Welcome letter discussed: Yes Patient provided with Welcome Letter, which includes attendance policy. Provided education regarding cancellation and no-show policy. Education regarding the importance of participation and regular attendance to maximize goal attainment.       OBJECTIVE MEASUREMENTS/DATA:    Time In Session:  Start Time: 0900  Stop Time: 1000  Time Calculation (min):  60 min   Assessment and Plan - 03/13/25 6958          Assessment    Plan of Care reviewed and patient/family in agreement Yes     System Pathology/Pathophysiology Noted musculoskeletal;neuromuscular;vestibular     Functional Limitations in Following Categories (PT Eval) self-care;home management;community/leisure     Rehab Potential/Prognosis good, to achieve stated therapy goals     Problem List decreased strength;impaired sensory feedback;decreased endurance;dizziness;impaired balance;pain     Clinical Assessment Pt is a 73 yo female with h/h orthostatic hypotension, who is referred to OPPT for imbalance.  Pt reports that she experiences dizziness with supine to sit, sit to stand, bend forward with return to upright, and looking up to ceiling, as well as when in busy environments with head moving side to side.  Orthostatic response to position change has been documented by cardiologist and neurologist, but symptoms are not well controlled at this time.  Pt reports she has been more unbalanced, and has been unable to perform yoga as a result.  Today, pt demonstrates decreased proximal strength of R>L LE via MMT, and she reports decreased strength in her hands.  She scores as a borderline falls risk based on FGA score of 21/30 (<22/30 inc risk for falls), and had most difficulty on items involving head movement, eyes closed and narrow base of support.  Pt also demonstrates significant difficulty with reactive balance and dual task challenge on miniBEST test, which also place her at an increased risk for falls.  Pt is motivated, and has a very active social life, so she is eager to improve her current status.  Pt is a good candidate for OPPT, and would benefit from skilled intervention to aide in improving c/o dizziness, and cardiovascular conditioning, strength, balance and endurance to maximize safety, and allow for a return to PLOF.     Plan and Recommendations Initiate OPPT.  Assess vestibular-ocular eval,  Saul exam, mMST, SOT, 30 sec STS, 6MWT.     Planned Services CPT 61957 Gait training;CPT 54538 Neuromuscular Reeducation;CPT 08988 Self-care/Home management training;CPT 15558 Therapeutic exercises;CPT 76501 Electrical stimulation ATTENDED;CPT 60592 Hot/Cold Packs therapy;CPT 86018 Canalith repositioning procedure/maneuvers;CPT 37995 Manual therapy;CPT 55689 Orthotics training (Initial encounter);CPT 95302 Orthotics/Prosthetics management and training (Subsequent encounters);CPT 55128 Therapeutic activities;CPT 54643 Electrical stimulation UNATTENDED                    General Information - 03/13/25 7520          Session Details    Document Type initial evaluation     Mode of Treatment individual therapy        General Information    Referring Physician Dr. Raoul Saravia     History of present illness/functional impairment Pt is a 73 yo female with h/o BRCA1 breast cancer, B mastectomy with reconstruction, GERD, HTN, and orthostatic dizziness, who presents with diagnosis of imbalance.  Pt reports her biggest issue is dizziness related to orthostatic hypotension- has tried midodrine, and is followed by cardiology.  Neurology confirmed orthostatic hypotension with related imbalance, and referred pt to PT to improve her balance and safety.  Pt has limited function in her hands due to arthritis.  Pt notes her balance is worse- notices it during yoga, as she cannot hold the poses she is used to being able to complete.  She also walks 2.5-3 miles daily without issue.  ENT (Lakshmi) has checked her inner ear, and cleared her for vestibular or BPPV issues, but notes some visual motion sensitivity in stores, with excessive head turning that exacerbates dizziness.  Pt experiences severe low back pain, right across top of pelvis.     Patient/Family/Caregiver Comments/Observations When patient stands abruptly, she feels she is spinning.  She has to hold on for a few seconds- its worse if she raises her arms into flexion  overhead.  If she bends down and gets up, she spins.  If she looks straight up at the ceiling, she spins.  Neurology did note (+) drop in BP with orthostatic assessment.  Has looked into salt tabs, but is unsure of correct brand.  Tried compression socks with zipper, but they hurt.  Pt denies numbness and tingling in feet.  Pt denies falls.     Existing Precautions/Restrictions fall;cardiac         Services    Do You Speak a Language Other Than English at Home? no        Behavioral Health Related Communication    Suicidal Ideation No                    Pain/Vitals - 03/13/25 0844          Pain Assessment    Currently in pain No/Denies        Pre Activity Vital Signs    Pulse 77     SpO2 95 %     /70     BP Location Left upper arm     BP Method Manual     Patient Position Sitting                    Falls/Food Screening - 03/13/25 0844          Initial Falls Assessment    One or more falls in the last year No        Food Insecurity    Within the past 12 months, you worried that your food would run out before you got the money to buy more. Never true     Within the past 12 months, the food you bought just didn't last and you didn't have money to get more. Never true                    PT - 03/13/25 0844          Physical Therapy    Physical Therapy Specialty Traditional Neuro PT        PT Plan    Frequency of treatment 2 times/week     PT Duration 3 months     PT Cert From 03/13/25     PT Cert To 06/11/25     Date PT POC was sent to provider 03/13/25                   Living Environment    Living Environment - 03/13/25 0844          Living Environment    People in Home spouse     Living Arrangements house     Living Environment Comment 55+ community, first floor living     Transportation Concerns none        Relationship/Environment    Name(s) of People in Home Rosey                   PLOF:    Prior Level of Function - 03/13/25 0844          OTHER    Previous level of function Independent, does  yoga, walks regularly.  Pt has an active social life.                   Sensory Tests    Sensory Testing - 03/13/25 0844          Sensory Assessment    Sensory Assessment sensation intact, lower extremities   LT awareness, localization and proprioception intact B LE                  Skin    Skin Assessment - 03/13/25 0844          LE Skin    Skin Condition Intact                   MMT    Manual Muscle Tests - 03/13/25 0844          RIGHT: Upper Extremity Manual Muscle Test Assessment    Shoulder Flexion gross movement (5/5) normal     Shoulder Abduction gross movement (5/5) normal     Shoulder Internal Rotation gross movement (5/5) normal     Shoulder External Rotation gross movement (5/5) normal     Elbow Flex gross movement (5/5) normal     Elbow Extension gross movement (4+/5) good plus        LEFT: Upper Extremity Manual Muscle Test Assessment    Shoulder Flexion gross movement (5/5) normal     Shoulder Abduction gross movement (5/5) normal     Shoulder Internal Rotation gross movement (5/5) normal     Shoulder External Rotation gross movement (5/5) normal     Elbow Flexion gross movement (5/5) normal     Elbow Extension gross movement (4+/5) good plus        RIGHT: Lower Extremity Manual Muscle Test Assessment    Hip Flexion gross movement (3+/5) fair plus     Hip Extension gross movement (4-/5) good minus     Hip Abduction gross movement (4-/5) good minus     Knee Flexion strength (4/5) good     Knee Extension strength (4+/5) good plus     Ankle Dorsiflexion gross movement (5/5) normal     Ankle Plantarflexion gross movement (5/5) normal     Ankle Inversion gross movement (5/5) normal     Ankle Eversion gross movement (5/5) normal        LEFT: Lower Extremity Manual Muscle Test Assessment    Hip Flexion gross movement (4/5) good     Hip Extension gross movement (4-/5) good minus     Hip Abduction gross movement (4-/5) good minus     Knee Flexion strength (4+/5) good plus     Knee Extension strength (5/5)  normal     Ankle Dorsiflexion gross movement (5/5) normal     Ankle Plantarflexion gross movement (5/5) normal     Ankle Inversion gross movement (5/5) normal     Ankle Eversion gross movement (5/5) normal                   Transfers    Transfers - 03/13/25 0844          Bed Mobility    Bed Mobility Activities supine to sit;sit to supine;rolling     Alcorn independent        Sit to Stand Transfer    Alcorn, Sit to Stand Transfer modified independence        Stand to Sit Transfer    Alcorn, Stand to Sit Transfer modified independence        Stand Pivot Transfer    Alcorn, Stand Pivot/Stand Step Transfer independent                   Gait and Mobility    Gait and Mobility - 03/13/25 0844          Gait Training    Alcorn, Gait independent     Variable surfaces Flat surface;Ramp     Pattern step-through     Deviations/Abnormal Patterns --   path deviation noted with head movement                  Neuromuscular/Motor    Neuromuscular/Motor - 03/13/25 0844          Motor Skills    Motor Skills coordination;muscle tone     Coordination bilateral;upper extremity;lower extremity     Comment: Coordination ROBSON of UE and LE intact     Muscle Tone bilateral;lower extremity(s);WNL                   Outcome Measures    PT Outcome Measures - 03/13/25 0844          Objective Outcome Measures    Gait Speed (m/sec) 1.1 m/sec   no AD    FGA Score (out of 30 total) 21   difficulty with HN>HT, tandem, EC, retro (2,3,2,2,3,3,1,1,2,2)       Other Outcome Measures Used/Comments    Other outcome measure used: miniBEST TEST: 21/28 (2,2,1,1,1,0,2,2,2,2,1,2,2,1)        Timed Up and Go Test    Trial One: Timed Up and Go Test 8.18     Trial Two: Timed Up and Go Test 12.39   COG TUG: counting back by 2 from 100, unable to continue counting once turn completed    Results, Timed Up and Go Test (Balance) from red chair, no AD                      Goals          Patient Stated      PT PSFS (pt-stated)       Improve  balance         Other      Mutually agreed upon pain goal       Mutually agreed upon pain goal: Pt will not report increase in low back pain as a result of PT intervention.        PT Goals       Short Term Goals Time Frame Result Comment/Progress   Pt will complete ACTIVE STAND TEST 4-6 weeks     Pt will complete the 30 second sit to stand test. (Age norm: Female Age 75-79 - 13 reps) 4-6 weeks     Pt will improve by >=4 points on the FGA to improve mobility and reduce fall risk.  (FGA fall risk </=22; MDC = 4 points) 4-6 weeks     Pt will complete the 6MWT to assess endurance with household mobility.  (Age norm: Female Age 70-79 - 1545ft  (471m)   ) 4-6 weeks     Patient will perform HEP with supervision level assistance. 4-6 weeks     Patient will complete mMST, SOT and vestibular-ocular assessment/frenzel exam. 4-6 weeks     Patient will improve by >/= 3 points on the Mini-BESTest to demonstrate improvement functional balance.  (Mini-BESTest fall risk </=17, MDC = 6 points) 4-6 weeks       Long Term Goals Time Frame Result Comment/Progress   Pt will report minimal to no dizziness with sit to stand/transitions 8-12 weeks     Pt will score >=28/30 on the FGA to improve stability and reduce fall risk.  (FGA fall risk </=22; MDC = 4 points) 8-12 weeks     Pt will further improve by 269ft or meet/exceed the age norm on the 6MWT to improve endurance with household and community mobility.  (Age norm: Female Age 70-79 - 1545ft  (471m)   ) 8-12 weeks     Patient will perform home/community exercise program independently.   8-12 weeks     Patient will score >/=27/28 on the Mini-BESTest to demonstrate decreased risk of falls.  (Mini-BESTest fall risk </=17, MDC = 6 points) 8-12 weeks     Patient will be able to tolerate >=20 minutes of moderate (Jesse 11-14/20) cardiovascular conditioning with vital signs stable to improve endurance with household and community mobility. 8-12 weeks     Patient to meet or exceed age-norm score  on the 30s STS test to demonstrate improvement in functional LE strength (Age norm: Female Age 75-79 - 13 reps) 8-12 weeks                           TREATMENT PLAN:      PT Neuro Exercises Current Session  Performed Today? (y/n)   THER ACT   (CPT 40809) TOTAL TIME FOR SESSION 8-22 min    Pain, vitals, subjective See flow yes   Orthostatic VS Supine 124/72  HR 75  Seated 120/72  HR 67 dizzy x15 sec  Stand 100/68 HR 66 brief dizzy  Stand x1 min 120/80 HR 67 no sx yes   Patient Education/HEP Pt educated on outcomes of testing, goals, plan for upcoming visits, POC and scheduling.  Pt verbalized understanding. yes   Bed Mobility     Transfer training     Subjective Outcomes Measures     ABC Scale assess    PSFS assessed yes   SELF CARE/HOME MANAGEMENT TRAINING (83302) TOTAL TIME FOR SESSION          THER EX  (CPT 40271) TOTAL TIME FOR SESSION     Education/HEP     STRETCHING     Stretching by patient     Stretching by therapist/PROM     CARDIOVASCULAR     Nu Step     Stationary Bike     Treadmill     Endurance Testing     Active Stand Test assess    6mWT assess    STRENGTH TRAINING     Standing Ther-Ex     Side-lying there-ex     Supine Ther-Ex     Prone Ther-Ex     Core exercises     Functional Strength Testing     30 sec STS test (60y +) assess    NEURO RE-ED  (CPT 12520) TOTAL TIME FOR SESSION     Education/HEP     VESTIBULAR Vestibular-ocular eval    SOT assess    MSQ Assess mMST    Adaptation     Compensation     Habiutation     COORDINATION     Target Tapping     Coordination ladder     POSTURAL RE-ED     Seated & standing reaching for trunk strengthening     Lizett-scapular strengthening     PRE-GAIT ACTIVITIES      Tap-ups     Step-ups     Standing weight shifting     Step over/back     BALANCE TRAINING     Standing balance - static/dynamic     Airex     Rockerboard     Hurdles     Split Stance     Bosu     Reactive Balance     Slip      Dynamic gait      Side stepping/grapevine     Retro walking     Tandem  Walking     Gait with eyes closed     Walking with ball toss     Balance Testing     miniBEST Test     FGA     TUG/COG TUG     10mWT     GAIT TRAINING  (CPT 44795) TOTAL TIME FOR SESSION     Education/HEP     Ambulation without AD     Stair negotiation     Curb negotiation     Ramp negotiation     Outdoor ambulation     BWS/vector training     MANUAL  (CPT 03869) TOTAL TIME FOR SESSION     Mobilization      MODALITIES TOTAL TIME FOR SESSION     Ice     Heat     ATTENDED E-STIM  (CPT 90590) TOTAL TIME FOR SESSION     Attended E-Stim     Unattended E-stim       ORTHOTICS TRAINING   TOTAL TIME FOR SESSION     Initial Encounter ([id number])     Subsequent Encounter ([id number])           ASSESSMENT:    This 74 y.o. year old female presents to PT with above stated diagnosis. Physical Therapy evaluation reveals decreased strength, impaired sensory feedback, decreased endurance, dizziness, impaired balance, pain resulting in self-care, home management, community/leisure limitations. Ruthy Kaminski will benefit from skilled PT services to address limitation, work towards rehab and patient goals and maximize PLOF of chosen ADLs.     Planned Services: The patient's treatment will include CPT 78322 Gait training, CPT 81720 Neuromuscular Reeducation, CPT 63873 Self-care/Home management training, CPT 15247 Therapeutic exercises, CPT 98676 Electrical stimulation ATTENDED, CPT 62797 Hot/Cold Packs therapy, CPT 78215 Canalith repositioning procedure/maneuvers, CPT 29836 Manual therapy, CPT 65202 Orthotics training (Initial encounter), CPT 31651 Orthotics/Prosthetics management and training (Subsequent encounters), CPT 74551 Therapeutic activities, CPT 52629 Electrical stimulation UNATTENDED, .     Live Agrawal, PT

## 2025-03-13 NOTE — OP PT TREATMENT LOG
PT Neuro Exercises Current Session  Performed Today? (y/n)   THER ACT   (CPT 68955) TOTAL TIME FOR SESSION 8-22 min    Pain, vitals, subjective See flow yes   Orthostatic VS Supine 124/72  HR 75  Seated 120/72  HR 67 dizzy x15 sec  Stand 100/68 HR 66 brief dizzy  Stand x1 min 120/80 HR 67 no sx yes   Patient Education/HEP Pt educated on outcomes of testing, goals, plan for upcoming visits, POC and scheduling.  Pt verbalized understanding. yes   Bed Mobility     Transfer training     Subjective Outcomes Measures     ABC Scale assess    PSFS assessed yes   SELF CARE/HOME MANAGEMENT TRAINING (03591) TOTAL TIME FOR SESSION          THER EX  (CPT 34748) TOTAL TIME FOR SESSION     Education/HEP     STRETCHING     Stretching by patient     Stretching by therapist/PROM     CARDIOVASCULAR     Nu Step     Stationary Bike     Treadmill     Endurance Testing     Active Stand Test assess    6mWT assess    STRENGTH TRAINING     Standing Ther-Ex     Side-lying there-ex     Supine Ther-Ex     Prone Ther-Ex     Core exercises     Functional Strength Testing     30 sec STS test (60y +) assess    NEURO RE-ED  (CPT 50115) TOTAL TIME FOR SESSION     Education/HEP     VESTIBULAR Vestibular-ocular eval    SOT assess    MSQ Assess mMST    Adaptation     Compensation     Habiutation     COORDINATION     Target Tapping     Coordination ladder     POSTURAL RE-ED     Seated & standing reaching for trunk strengthening     Lizett-scapular strengthening     PRE-GAIT ACTIVITIES      Tap-ups     Step-ups     Standing weight shifting     Step over/back     BALANCE TRAINING     Standing balance - static/dynamic     Airex     Rockerboard     Hurdles     Split Stance     Bosu     Reactive Balance     Slip      Dynamic gait      Side stepping/grapevine     Retro walking     Tandem Walking     Gait with eyes closed     Walking with ball toss     Balance Testing     miniBEST Test     FGA     TUG/COG TUG     10mWT     GAIT TRAINING  (CPT 48578)  TOTAL TIME FOR SESSION     Education/HEP     Ambulation without AD     Stair negotiation     Curb negotiation     Ramp negotiation     Outdoor ambulation     BWS/vector training     MANUAL  (CPT 25663) TOTAL TIME FOR SESSION     Mobilization      MODALITIES TOTAL TIME FOR SESSION     Ice     Heat     ATTENDED E-STIM  (CPT 80206) TOTAL TIME FOR SESSION     Attended E-Stim     Unattended E-stim       ORTHOTICS TRAINING   TOTAL TIME FOR SESSION     Initial Encounter ([id number])     Subsequent Encounter ([id number])

## 2025-04-08 ENCOUNTER — HOSPITAL ENCOUNTER (OUTPATIENT)
Dept: PHYSICAL THERAPY | Facility: REHABILITATION | Age: 75
Setting detail: THERAPIES SERIES
Discharge: HOME | End: 2025-04-08
Attending: PSYCHIATRY & NEUROLOGY
Payer: MEDICARE

## 2025-04-08 DIAGNOSIS — Z74.09 IMPAIRED FUNCTIONAL MOBILITY, BALANCE, GAIT, AND ENDURANCE: ICD-10-CM

## 2025-04-08 DIAGNOSIS — R42 DIZZINESS: ICD-10-CM

## 2025-04-08 DIAGNOSIS — R26.81 UNSTEADINESS ON FEET: Primary | ICD-10-CM

## 2025-04-08 PROCEDURE — 97530 THERAPEUTIC ACTIVITIES: CPT | Mod: GP

## 2025-04-08 PROCEDURE — 97112 NEUROMUSCULAR REEDUCATION: CPT | Mod: GP

## 2025-04-08 NOTE — PATIENT INSTRUCTIONS
Gaze Stabilization: Sitting        Keeping eyes on target on wall 5 feet away, move head side to side for 60 seconds. Repeat while moving head up and down for 60 seconds.  Do 3 sessions per day.

## 2025-04-08 NOTE — OP PT TREATMENT LOG
"PT Neuro Exercises Current Session   Performed Today? (y/n)   THER ACT   (CPT 35120) TOTAL TIME FOR SESSION 8-22 min     Pain, vitals, subjective See flow yes   Orthostatic VS Supine 124/72  HR 75  Seated 120/72  HR 67 dizzy x15 sec  Stand 100/68 HR 66 brief dizzy  Stand x1 min 120/80 HR 67 no sx    Patient Education/HEP Pt educated on outcomes of testing, goals, plan for upcoming visits, POC and scheduling.  Pt verbalized understanding.    Pt educated on outcomes of vestibular assessment, motion sensitivity test and SOT.  She verbalized understanding.         yes   Bed Mobility       Transfer training       Subjective Outcomes Measures       ABC Scale assess     PSFS assessed    SELF CARE/HOME MANAGEMENT TRAINING (76120) TOTAL TIME FOR SESSION               THER EX  (CPT 68420) TOTAL TIME FOR SESSION       Education/HEP       STRETCHING       Stretching by patient       Stretching by therapist/PROM       CARDIOVASCULAR       Nu Step       Stationary Bike       Treadmill       Endurance Testing       Active Stand Test assess     6mWT assess     STRENGTH TRAINING       Standing Ther-Ex Minisquats  Lunges     Side-lying there-ex LLR  Clamshell     Supine Ther-Ex Bridge trio  SLR  PPT with adductor ball squeeze and LAQ     Prone Ther-Ex Quadruped if not dizzy     Core exercises 90/90 hold  90/90 heel taps     Functional Strength Testing       30 sec STS test (60y +) assessed Yes (billed with TA)   NEURO RE-ED  (CPT 03444) TOTAL TIME FOR SESSION   38-52 Minutes       Education/HEP  Pt provided with initial HEP for seated VORx1.  Issued handout and target- see media for details.  Pt verbalized understanding to instructions. yes   VESTIBULAR Vestibular-ocular eval  YES   SOT assess  YES   MSQ Assess mMST  YES   Adaptation Seated VORx1  Hx60\" @SSV  Vx60\" @SSV    VOR-C yes   Compensation       Habiutation Trunk rotation ball pass  Pick ups     COORDINATION       Target Tapping       Coordination ladder       POSTURAL RE-ED  " "     Seated & standing reaching for trunk strengthening       Lizett-scapular strengthening       PRE-GAIT ACTIVITIES        Tap-ups       Step-ups       Standing weight shifting       Step over/back       BALANCE TRAINING       Standing balance - static/dynamic       Airex       Rockerboard       Hurdles       Split Stance       Bosu       Reactive Balance       Slip        Dynamic gait        Side stepping/grapevine       Retro walking       Tandem Walking       Gait with eyes closed       Walking with ball toss       Balance Testing       miniBEST Test       FGA       TUG/COG TUG       10mWT       GAIT TRAINING  (CPT 69425) TOTAL TIME FOR SESSION       Education/HEP       Ambulation without AD       Stair negotiation       Curb negotiation       Ramp negotiation       Outdoor ambulation       BWS/vector training       MANUAL  (CPT 21951) TOTAL TIME FOR SESSION       Mobilization        MODALITIES TOTAL TIME FOR SESSION       Ice       Heat       ATTENDED E-STIM  (CPT 63405) TOTAL TIME FOR SESSION       Attended E-Stim       Unattended E-stim          ORTHOTICS TRAINING    TOTAL TIME FOR SESSION       Initial Encounter ([id number])       Subsequent Encounter ([id number])       Modified Motion Sensitivity Test (mMST)  Date: __4/8/25___________Baseline Level (0-10):___0__________MSQ:_____18______________  *All movements are done in standing, eyes are open, in front of a plain wall.   Symptoms must return to baseline before the next movement is performed.    Position Change Intensity Adjusted Intensity Duration Score   5x Horizontal head turns 1 1 5\"  0 1   5x Vertical head turns 0      5x Right diagonal head turns (upper left down to right) 1 1 7\"  1 2   5x Left diagonal head turns (upper right down to left) 1 1 5\"  0 1   5x Trunk Bends (bending knees reaching to floor) 3 3 9\"  1 4   5x Right quarter body turns (Look over right shoulder with trunk rotation, feet planted) 3 3 11\" 2 5   5x Left quarter body turns (Look " "over left shoulder with trunk rotation, feet planted) 3 3 15\"  2 5   1x 360 degree turn to right 2 2 6\"  1 3   1x 360 degree turn to left  3 3 9\"  1 4   5x VOR cancellation (following thumbs horizontally with head/trunk rotation x45 degrees each way) 2 2 7\"  1 3      Total Score =  28     Intensity: Scale from 0 to 10 (0 = No sx; 10 = severe)  Adjusted Intensity: Intensity Level - Baseline Level  Duration: Scale from 0 to 4 for return to baseline (<5 sec = 0; 5-10 sec = 1; 11-20 sec = 2; 21-30 sec = 3; >30 sec = 4)  Score: Adjusted Intensity + Duration    mMSQ = Total score x (# of positions) / 14.00                             MSQ = ______18____________                     __28____  X  ___9__   /14.00                                                                  0-10  mild range                                                                                                                                    11-30  moderate                                                                                                                                      severe    "

## 2025-04-08 NOTE — PROGRESS NOTES
Physical Therapy Visit    PT DAILY NOTE FOR OUTPATIENT THERAPY    Patient: Ruthy Kaminski MRN: [id number]  : 1950 74 y.o.  Referring Physician: Raoul Saravia DO  Date of Visit: 2025    Certification Dates: 25 through 25     Diagnosis:   1. Unsteadiness on feet    2. Impaired functional mobility, balance, gait, and endurance    3. Dizziness        Chief Complaints:  Imbalance, dizziness    Precautions:   Existing Precautions/Restrictions: fall, cardiac      TODAY'S VISIT    Time In Session:  Start Time: 1700  Stop Time: 1800  Time Calculation (min): 60 min   History/Vitals/Pain/Encounter Info - 25 1656          Injury History/Precautions/Daily Required Info    Document Type daily treatment     Primary Therapist Live Agrawal     Chief Complaint/Reason for Visit  Imbalance, dizziness     Referring Physician Dr. Raoul Saravia     Existing Precautions/Restrictions fall;cardiac     History of present illness/functional impairment Pt is a 75 yo female with h/o BRCA1 breast cancer, B mastectomy with reconstruction, GERD, HTN, and orthostatic dizziness, who presents with diagnosis of imbalance.  Pt reports her biggest issue is dizziness related to orthostatic hypotension- has tried midodrine, and is followed by cardiology.  Neurology confirmed orthostatic hypotension with related imbalance, and referred pt to PT to improve her balance and safety.  Pt has limited function in her hands due to arthritis.  Pt notes her balance is worse- notices it during yoga, as she cannot hold the poses she is used to being able to complete.  She also walks 2.5-3 miles daily without issue.  ENT ([facility]) has checked her inner ear, and cleared her for vestibular or BPPV issues, but notes some visual motion sensitivity in stores, with excessive head turning that exacerbates dizziness.  Pt experiences severe low back pain, right across top of pelvis.     Patient/Family/Caregiver Comments/Observations Pt  "returns after 12 day trip to [place].  While she was there, she had no dizziness (first time in 12 years she was not dizzy).  Watched TV more sitting up vs laying down.  Last night she did get dizzy at home.     Patient reported fall since last visit No        Pain Assessment    Currently in pain No/Denies        Pre Activity Vital Signs    /72     BP Location Left upper arm     BP Method Manual     Patient Position Sitting         Services    Do You Speak a Language Other Than English at Home? no                    Daily Treatment Assessment and Plan - 04/08/25 1656          Daily Treatment Assessment and Plan    Progress toward goals Progressing     Daily Outcome Summary Pt completed vestibular-ocular assessment and frenzel exam this visit.  Pt demonstrates abnormal saccades, and VORx1 (horizontal), as well as direction changing nystagmus at mid range.  Pt did not demonstrate any signs of BPPV this visit, with all positional testing (-) for abnormal nystagmus and vertigo.  Pt did experience lightheadedness with return to sit from B hallpike positions, as well as supine to sit from HRT.  mMST completed with pt demonstrating moderate motion sensitivity, with most provoking position changes being quarter body turns B.  SOT demonstrates composite above age norms, but vestibular dysfunction.  Pt was instructed in VORx1, seated, at SSV, and issued handout and target.  She verbalized understanding to instructions.     Plan and Recommendations Hip/core strengthening, vestibular adaptation/habituation, static/dynamic/reactive balance, cardio as tolerated                          OBJECTIVE DATA TAKEN TODAY:    Vestibular     PT Vestibular Evaluation - 04/08/25 1700          Vestibular/Ocular    Corrective lenses None     Comments cataracts     Eye ROM WNL     Convergence Vision ABN     Comments ~8\", uncomfortable     Divergent Vision WNL     Pupil Alignment WNL     Cover/Uncover WNL     Cover/Cross " Over WNL     Smooth Pursuit/Small Target WNL     Saccades Abnormal     Comments undershoot R->center, center->L, center to up circular path to R ( c shape)     Vestibular Ocular Reflex (VOR) Abnormal     Comments able to do H slow, but unable to inc pace without slip B     Spontaneous Evoked Nystagmus WNL     Gaze-Evoked Nystagmus Abnormal     Comments 2-3 beats at mid and end range, R beat in R gaze, L beat in L gaze     Head Thrust Test WNL     VOR Cancellation Abnormal     Comments inc dizziness after test        Frenzel's Exam    Spontaneous Nystagmus WNL     Gaze Evoked Nystagmus Abnormal     Comments direction changing to R and L at mid and end range, stronger to L, L beat in up and down gaze     Convergence Spasm WNL     SVINT (Skull Vibration-Induced Nystagmus Test) WNL        BPPV    Right Fabiano Hallpike WNL;Modified with tilt table     Left [place] Hallpike WNL;Modified with tilt table     Sit to Supine WNL     Horizontal Canal/Roll Test WNL;Right;Left        Motion Sensitivity    MODIFIED MSQ Percentage 18 %     MODIFIED MSQ number of provoking positions 9        Balance Master    SOT Composite Norms WNL     SOT Composite Score 70     Somatosensory WNL   89    Visual WNL   93    Vestibular Below norms   43    Visual Preference WNL   119                    Today's Treatment:    PT Neuro Exercises Current Session   Performed Today? (y/n)   THER ACT   (CPT 24315) TOTAL TIME FOR SESSION 8-22 min     Pain, vitals, subjective See flow yes   Orthostatic VS Supine 124/72  HR 75  Seated 120/72  HR 67 dizzy x15 sec  Stand 100/68 HR 66 brief dizzy  Stand x1 min 120/80 HR 67 no sx    Patient Education/HEP Pt educated on outcomes of testing, goals, plan for upcoming visits, POC and scheduling.  Pt verbalized understanding.    Pt educated on outcomes of vestibular assessment, motion sensitivity test and SOT.  She verbalized understanding.         yes   Bed Mobility       Transfer training       Subjective Outcomes Measures    "    ABC Scale assess     PSFS assessed    SELF CARE/HOME MANAGEMENT TRAINING (19805) TOTAL TIME FOR SESSION               THER EX  (CPT 21343) TOTAL TIME FOR SESSION       Education/HEP       STRETCHING       Stretching by patient       Stretching by therapist/PROM       CARDIOVASCULAR       Nu Step       Stationary Bike       Treadmill       Endurance Testing       Active Stand Test assess     6mWT assess     STRENGTH TRAINING       Standing Ther-Ex Minisquats  Lunges     Side-lying there-ex LLR  Clamshell     Supine Ther-Ex Bridge trio  SLR  PPT with adductor ball squeeze and LAQ     Prone Ther-Ex Quadruped if not dizzy     Core exercises 90/90 hold  90/90 heel taps     Functional Strength Testing       30 sec STS test (60y +) assessed Yes (billed with TA)   NEURO RE-ED  (CPT 84176) TOTAL TIME FOR SESSION   38-52 Minutes       Education/HEP  Pt provided with initial HEP for seated VORx1.  Issued handout and target- see media for details.  Pt verbalized understanding to instructions. yes   VESTIBULAR Vestibular-ocular eval  YES   SOT assess  YES   MSQ Assess mMST  YES   Adaptation Seated VORx1  Hx60\" @SSV  Vx60\" @SSV    VOR-C yes   Compensation       Habiutation Trunk rotation ball pass  Pick ups     COORDINATION       Target Tapping       Coordination ladder       POSTURAL RE-ED       Seated & standing reaching for trunk strengthening       Lizett-scapular strengthening       PRE-GAIT ACTIVITIES        Tap-ups       Step-ups       Standing weight shifting       Step over/back       BALANCE TRAINING       Standing balance - static/dynamic       Airex       Rockerboard       Hurdles       Split Stance       Bosu       Reactive Balance       Slip        Dynamic gait        Side stepping/grapevine       Retro walking       Tandem Walking       Gait with eyes closed       Walking with ball toss       Balance Testing       miniBEST Test       FGA       TUG/COG TUG       10mWT       GAIT TRAINING  (CPT 28842) TOTAL " "TIME FOR SESSION       Education/HEP       Ambulation without AD       Stair negotiation       Curb negotiation       Ramp negotiation       Outdoor ambulation       BWS/vector training       MANUAL  (CPT 30886) TOTAL TIME FOR SESSION       Mobilization        MODALITIES TOTAL TIME FOR SESSION       Ice       Heat       ATTENDED E-STIM  (CPT 44935) TOTAL TIME FOR SESSION       Attended E-Stim       Unattended E-stim          ORTHOTICS TRAINING    TOTAL TIME FOR SESSION       Initial Encounter ([id number])       Subsequent Encounter ([id number])       Modified Motion Sensitivity Test (mMST)  Date: __4/8/25___________Baseline Level (0-10):___0__________MSQ:_____18______________  *All movements are done in standing, eyes are open, in front of a plain wall.   Symptoms must return to baseline before the next movement is performed.    Position Change Intensity Adjusted Intensity Duration Score   5x Horizontal head turns 1 1 5\"  0 1   5x Vertical head turns 0      5x Right diagonal head turns (upper left down to right) 1 1 7\"  1 2   5x Left diagonal head turns (upper right down to left) 1 1 5\"  0 1   5x Trunk Bends (bending knees reaching to floor) 3 3 9\"  1 4   5x Right quarter body turns (Look over right shoulder with trunk rotation, feet planted) 3 3 11\" 2 5   5x Left quarter body turns (Look over left shoulder with trunk rotation, feet planted) 3 3 15\"  2 5   1x 360 degree turn to right 2 2 6\"  1 3   1x 360 degree turn to left  3 3 9\"  1 4   5x VOR cancellation (following thumbs horizontally with head/trunk rotation x45 degrees each way) 2 2 7\"  1 3      Total Score =  28     Intensity: Scale from 0 to 10 (0 = No sx; 10 = severe)  Adjusted Intensity: Intensity Level - Baseline Level  Duration: Scale from 0 to 4 for return to baseline (<5 sec = 0; 5-10 sec = 1; 11-20 sec = 2; 21-30 sec = 3; >30 sec = 4)  Score: Adjusted Intensity + Duration    mMSQ = Total score x (# of positions) / 14.00                             MSQ = " ______18____________                     __28____  X  ___9__   /14.00                                                                  0-10  mild range                                                                                                                                    11-30  moderate                                                                                                                                      severe

## 2025-04-11 ENCOUNTER — HOSPITAL ENCOUNTER (OUTPATIENT)
Dept: PHYSICAL THERAPY | Facility: REHABILITATION | Age: 75
Setting detail: THERAPIES SERIES
Discharge: HOME | End: 2025-04-11
Attending: PSYCHIATRY & NEUROLOGY
Payer: MEDICARE

## 2025-04-11 DIAGNOSIS — Z74.09 IMPAIRED FUNCTIONAL MOBILITY, BALANCE, GAIT, AND ENDURANCE: ICD-10-CM

## 2025-04-11 DIAGNOSIS — R42 DIZZINESS: ICD-10-CM

## 2025-04-11 DIAGNOSIS — R26.81 UNSTEADINESS ON FEET: Primary | ICD-10-CM

## 2025-04-11 PROCEDURE — 97530 THERAPEUTIC ACTIVITIES: CPT | Mod: GP

## 2025-04-11 PROCEDURE — 97110 THERAPEUTIC EXERCISES: CPT | Mod: GP

## 2025-04-11 NOTE — OP PT TREATMENT LOG
"PT Neuro Exercises Current Session   Performed Today? (y/n)   THER ACT   (CPT 36984) TOTAL TIME FOR SESSION 8-22 min     Pain, vitals, subjective See flow yes   Orthostatic VS Supine 124/72  HR 75  Seated 120/72  HR 67 dizzy x15 sec  Stand 100/68 HR 66 brief dizzy  Stand x1 min 120/80 HR 67 no sx    Patient Education/HEP Pt educated on outcomes of testing, goals, plan for upcoming visits, POC and scheduling.  Pt verbalized understanding.    Pt educated on outcomes of vestibular assessment, motion sensitivity test and SOT.  She verbalized understanding.            Bed Mobility       Transfer training       Subjective Outcomes Measures       ABC Scale assess     PSFS assessed    SELF CARE/HOME MANAGEMENT TRAINING (66262) TOTAL TIME FOR SESSION               THER EX  (CPT 34669) TOTAL TIME FOR SESSION  38-52 Minutes       Education/HEP  Pt provided with initial HEP handout- see media.  Issued via handout and email, with blue TB.  Pt verbalized understanding. yes   STRETCHING       Stretching by patient  Happy Baby x2 min yes   Stretching by therapist/PROM       CARDIOVASCULAR       Nu Step       Stationary Bike       Treadmill       Endurance Testing       Active Stand Test assess     6mWT assess     STRENGTH TRAINING       Standing Ther-Ex Minisquats  Lunges     Side-lying there-ex LLR iso hold x60\" B  Clamshell iso holds x60\" B yes  yes   Supine Ther-Ex Bridge with iso abd with blue TB x10  Clamshell bridge with blue TB x10  First position bridge with blue TB x10   SLR  Pelvic tilts x20  PPT with adductor ball squeeze x5\", x10  PPT with adductor ball squeeze and LAQ x10  yes to all   Prone Ther-Ex Quadruped if not dizzy     Core exercises 90/90 hold 2x30\"  LTR with TAC x10 B  90/90 heel taps Yes  yes   Functional Strength Testing       30 sec STS test (60y +) assessed    NEURO RE-ED  (CPT 98733) TOTAL TIME FOR SESSION   0-7 Minutes       Education/HEP  Pt provided with initial HEP for seated VORx1.  Issued handout " "and target- see media for details.  Pt verbalized understanding to instructions. Reviewed-yes   VESTIBULAR Vestibular-ocular eval done    SOT assessed    MSQ Assessed mMST    Adaptation Seated VORx1, B held in hand  Hx60\" @SSV  Vx60\" @SSV, slight dizzy    VOR-C- seated with B in hands  H x60\", inc to 5/10 dizzy Yes        yes   Compensation       Habiutation Trunk rotation ball pass  Pick ups     COORDINATION       Target Tapping       Coordination ladder       POSTURAL RE-ED       Seated & standing reaching for trunk strengthening       Lizett-scapular strengthening       PRE-GAIT ACTIVITIES        Tap-ups       Step-ups       Standing weight shifting       Step over/back       BALANCE TRAINING       Standing balance - static/dynamic       Airex       Rockerboard       Hurdles       Split Stance       Bosu       Reactive Balance       Slip        Dynamic gait        Side stepping/grapevine       Retro walking       Tandem Walking       Gait with eyes closed       Walking with ball toss       Balance Testing       miniBEST Test       FGA       TUG/COG TUG       10mWT       GAIT TRAINING  (CPT 91934) TOTAL TIME FOR SESSION       Education/HEP       Ambulation without AD       Stair negotiation       Curb negotiation       Ramp negotiation       Outdoor ambulation       BWS/vector training       MANUAL  (CPT 41557) TOTAL TIME FOR SESSION       Mobilization        MODALITIES TOTAL TIME FOR SESSION       Ice       Heat       ATTENDED E-STIM  (CPT 04189) TOTAL TIME FOR SESSION       Attended E-Stim       Unattended E-stim          ORTHOTICS TRAINING    TOTAL TIME FOR SESSION       Initial Encounter ([id number])       Subsequent Encounter ([id number])       Modified Motion Sensitivity Test (mMST)  Date: __4/8/25___________Baseline Level (0-10):___0__________MSQ:_____18______________  *All movements are done in standing, eyes are open, in front of a plain wall.   Symptoms must return to baseline before the next movement is " "performed.    Position Change Intensity Adjusted Intensity Duration Score   5x Horizontal head turns 1 1 5\"  0 1   5x Vertical head turns 0      5x Right diagonal head turns (upper left down to right) 1 1 7\"  1 2   5x Left diagonal head turns (upper right down to left) 1 1 5\"  0 1   5x Trunk Bends (bending knees reaching to floor) 3 3 9\"  1 4   5x Right quarter body turns (Look over right shoulder with trunk rotation, feet planted) 3 3 11\" 2 5   5x Left quarter body turns (Look over left shoulder with trunk rotation, feet planted) 3 3 15\"  2 5   1x 360 degree turn to right 2 2 6\"  1 3   1x 360 degree turn to left  3 3 9\"  1 4   5x VOR cancellation (following thumbs horizontally with head/trunk rotation x45 degrees each way) 2 2 7\"  1 3      Total Score =  28     Intensity: Scale from 0 to 10 (0 = No sx; 10 = severe)  Adjusted Intensity: Intensity Level - Baseline Level  Duration: Scale from 0 to 4 for return to baseline (<5 sec = 0; 5-10 sec = 1; 11-20 sec = 2; 21-30 sec = 3; >30 sec = 4)  Score: Adjusted Intensity + Duration    mMSQ = Total score x (# of positions) / 14.00                             MSQ = ______18____________                     __28____  X  ___9__   /14.00                                                                  0-10  mild range                                                                                                                                    11-30  moderate                                                                                                                                      severe    "

## 2025-04-11 NOTE — PROGRESS NOTES
Physical Therapy Visit    PT DAILY NOTE FOR OUTPATIENT THERAPY    Patient: Ruthy Kaminski MRN: [id number]  : 1950 74 y.o.  Referring Physician: Raoul Saravia DO  Date of Visit: 2025    Certification Dates: 25 through 25     Diagnosis:   1. Unsteadiness on feet    2. Impaired functional mobility, balance, gait, and endurance    3. Dizziness        Chief Complaints:  Imbalance, dizziness    Precautions:   Existing Precautions/Restrictions: fall, cardiac      TODAY'S VISIT    Time In Session:  Start Time: 1205  Stop Time: 1300  Time Calculation (min): 55 min   History/Vitals/Pain/Encounter Info - 25 1202          Injury History/Precautions/Daily Required Info    Document Type daily treatment     Primary Therapist Live Agrawal     Chief Complaint/Reason for Visit  Imbalance, dizziness     Referring Physician Dr. Raoul Saravia     Existing Precautions/Restrictions fall;cardiac     History of present illness/functional impairment Pt is a 75 yo female with h/o BRCA1 breast cancer, B mastectomy with reconstruction, GERD, HTN, and orthostatic dizziness, who presents with diagnosis of imbalance.  Pt reports her biggest issue is dizziness related to orthostatic hypotension- has tried midodrine, and is followed by cardiology.  Neurology confirmed orthostatic hypotension with related imbalance, and referred pt to PT to improve her balance and safety.  Pt has limited function in her hands due to arthritis.  Pt notes her balance is worse- notices it during yoga, as she cannot hold the poses she is used to being able to complete.  She also walks 2.5-3 miles daily without issue.  ENT ([facility]) has checked her inner ear, and cleared her for vestibular or BPPV issues, but notes some visual motion sensitivity in stores, with excessive head turning that exacerbates dizziness.  Pt experiences severe low back pain, right across top of pelvis.     Patient/Family/Caregiver Comments/Observations Pt  reports low back pain that came on during the night, and is ~3/10.  Still experiencing dizziness with transitions.     Patient reported fall since last visit No        Pain Assessment    Currently in pain Yes     Preferred Pain Scale number (Numeric Rating Pain Scale)     Pain Side/Orientation lower     Pain: Body location Back     Pain Rating (0-10): Pre Activity 3        Pain Intervention    Intervention  monitored, stretching     Post Intervention Comments dec        Pre Activity Vital Signs    /70     BP Location Left upper arm     BP Method Manual     Patient Position Sitting         Services    Do You Speak a Language Other Than English at Home? no                    Daily Treatment Assessment and Plan - 04/11/25 1202          Daily Treatment Assessment and Plan    Progress toward goals Progressing     Daily Outcome Summary Pt initiated hip and core strengthening, and reported significant challenge with all exercises performed.  Occasional twinge in back pain noted, which resolved with pt cued to contract core and maintain PPT.  Pt was issued HEP as noted in media, as well as blue TB, and she verbalized understanding.  Review of vestibular HEP performed, with pt instructed to inc speed of VORx1 to inc challenge.  VOR-C intiiated as well, with moderate dizziness noted after completion of exercise.  Dizziness did resolve with seated rest.     Plan and Recommendations Hip/core strengthening, vestibular adaptation/habituation, static/dynamic/reactive balance, cardio as tolerated                          OBJECTIVE DATA TAKEN TODAY:    None taken    Today's Treatment:    PT Neuro Exercises Current Session   Performed Today? (y/n)   THER ACT   (CPT 50151) TOTAL TIME FOR SESSION 8-22 min     Pain, vitals, subjective See flow yes   Orthostatic VS Supine 124/72  HR 75  Seated 120/72  HR 67 dizzy x15 sec  Stand 100/68 HR 66 brief dizzy  Stand x1 min 120/80 HR 67 no sx    Patient Education/HEP Pt educated  "on outcomes of testing, goals, plan for upcoming visits, POC and scheduling.  Pt verbalized understanding.    Pt educated on outcomes of vestibular assessment, motion sensitivity test and SOT.  She verbalized understanding.            Bed Mobility       Transfer training       Subjective Outcomes Measures       ABC Scale assess     PSFS assessed    SELF CARE/HOME MANAGEMENT TRAINING (83480) TOTAL TIME FOR SESSION               THER EX  (CPT 96545) TOTAL TIME FOR SESSION  38-52 Minutes       Education/HEP  Pt provided with initial HEP handout- see media.  Issued via handout and email, with blue TB.  Pt verbalized understanding. yes   STRETCHING       Stretching by patient  Happy Baby x2 min yes   Stretching by therapist/PROM       CARDIOVASCULAR       Nu Step       Stationary Bike       Treadmill       Endurance Testing       Active Stand Test assess     6mWT assess     STRENGTH TRAINING       Standing Ther-Ex Minisquats  Lunges     Side-lying there-ex LLR iso hold x60\" B  Clamshell iso holds x60\" B yes  yes   Supine Ther-Ex Bridge with iso abd with blue TB x10  Clamshell bridge with blue TB x10  First position bridge with blue TB x10   SLR  Pelvic tilts x20  PPT with adductor ball squeeze x5\", x10  PPT with adductor ball squeeze and LAQ x10  yes to all   Prone Ther-Ex Quadruped if not dizzy     Core exercises 90/90 hold 2x30\"  LTR with TAC x10 B  90/90 heel taps Yes  yes   Functional Strength Testing       30 sec STS test (60y +) assessed    NEURO RE-ED  (CPT 60871) TOTAL TIME FOR SESSION   0-7 Minutes       Education/HEP  Pt provided with initial HEP for seated VORx1.  Issued handout and target- see media for details.  Pt verbalized understanding to instructions. Reviewed-yes   VESTIBULAR Vestibular-ocular eval done    SOT assessed    MSQ Assessed mMST    Adaptation Seated VORx1, B held in hand  Hx60\" @SSV  Vx60\" @SSV, slight dizzy    VOR-C- seated with B in hands  H x60\", inc to 5/10 dizzy Yes        yes " "  Compensation       Habiutation Trunk rotation ball pass  Pick ups     COORDINATION       Target Tapping       Coordination ladder       POSTURAL RE-ED       Seated & standing reaching for trunk strengthening       Lizett-scapular strengthening       PRE-GAIT ACTIVITIES        Tap-ups       Step-ups       Standing weight shifting       Step over/back       BALANCE TRAINING       Standing balance - static/dynamic       Airex       Rockerboard       Hurdles       Split Stance       Bosu       Reactive Balance       Slip        Dynamic gait        Side stepping/grapevine       Retro walking       Tandem Walking       Gait with eyes closed       Walking with ball toss       Balance Testing       miniBEST Test       FGA       TUG/COG TUG       10mWT       GAIT TRAINING  (CPT 13936) TOTAL TIME FOR SESSION       Education/HEP       Ambulation without AD       Stair negotiation       Curb negotiation       Ramp negotiation       Outdoor ambulation       BWS/vector training       MANUAL  (CPT 34684) TOTAL TIME FOR SESSION       Mobilization        MODALITIES TOTAL TIME FOR SESSION       Ice       Heat       ATTENDED E-STIM  (CPT 80032) TOTAL TIME FOR SESSION       Attended E-Stim       Unattended E-stim          ORTHOTICS TRAINING    TOTAL TIME FOR SESSION       Initial Encounter ([id number])       Subsequent Encounter ([id number])       Modified Motion Sensitivity Test (mMST)  Date: __4/8/25___________Baseline Level (0-10):___0__________MSQ:_____18______________  *All movements are done in standing, eyes are open, in front of a plain wall.   Symptoms must return to baseline before the next movement is performed.    Position Change Intensity Adjusted Intensity Duration Score   5x Horizontal head turns 1 1 5\"  0 1   5x Vertical head turns 0      5x Right diagonal head turns (upper left down to right) 1 1 7\"  1 2   5x Left diagonal head turns (upper right down to left) 1 1 5\"  0 1   5x Trunk Bends (bending knees reaching to floor) " "3 3 9\"  1 4   5x Right quarter body turns (Look over right shoulder with trunk rotation, feet planted) 3 3 11\" 2 5   5x Left quarter body turns (Look over left shoulder with trunk rotation, feet planted) 3 3 15\"  2 5   1x 360 degree turn to right 2 2 6\"  1 3   1x 360 degree turn to left  3 3 9\"  1 4   5x VOR cancellation (following thumbs horizontally with head/trunk rotation x45 degrees each way) 2 2 7\"  1 3      Total Score =  28     Intensity: Scale from 0 to 10 (0 = No sx; 10 = severe)  Adjusted Intensity: Intensity Level - Baseline Level  Duration: Scale from 0 to 4 for return to baseline (<5 sec = 0; 5-10 sec = 1; 11-20 sec = 2; 21-30 sec = 3; >30 sec = 4)  Score: Adjusted Intensity + Duration    mMSQ = Total score x (# of positions) / 14.00                             MSQ = ______18____________                     __28____  X  ___9__   /14.00                                                                  0-10  mild range                                                                                                                                    11-30  moderate                                                                                                                                      severe                           "

## 2025-04-14 ENCOUNTER — HOSPITAL ENCOUNTER (OUTPATIENT)
Dept: PHYSICAL THERAPY | Facility: REHABILITATION | Age: 75
Setting detail: THERAPIES SERIES
Discharge: HOME | End: 2025-04-14
Attending: PSYCHIATRY & NEUROLOGY
Payer: MEDICARE

## 2025-04-14 DIAGNOSIS — R42 DIZZINESS: ICD-10-CM

## 2025-04-14 DIAGNOSIS — R26.81 UNSTEADINESS ON FEET: Primary | ICD-10-CM

## 2025-04-14 DIAGNOSIS — Z74.09 IMPAIRED FUNCTIONAL MOBILITY, BALANCE, GAIT, AND ENDURANCE: ICD-10-CM

## 2025-04-14 PROCEDURE — 97110 THERAPEUTIC EXERCISES: CPT | Mod: GP

## 2025-04-14 PROCEDURE — 97530 THERAPEUTIC ACTIVITIES: CPT | Mod: GP

## 2025-04-14 PROCEDURE — 97140 MANUAL THERAPY 1/> REGIONS: CPT | Mod: GP

## 2025-04-14 NOTE — PROGRESS NOTES
Physical Therapy Progress Note    PT PROGRESS NOTE FOR OUTPATIENT THERAPY    Patient: Ruthy Kaminski   MRN: [id number]  : 1950 74 y.o.    Referring Physician: Raoul Saravia DO  Date of Visit: 2025    Certification Dates: 25 through 25    Recommended Frequency & Duration:  2 times/week for up to 3 months        Diagnosis:   1. Unsteadiness on feet    2. Impaired functional mobility, balance, gait, and endurance    3. Dizziness        Chief Complaints:  Chief Complaint   Patient presents with    Difficulty Walking    Balance Deficits    Dec Strength    Pain    Decreased Endurance    Decreased Trunk Control     Decreased Community Integration    Decreased recreational/play activity    Vertigo    Dizziness       Precautions:   Existing Precautions/Restrictions: fall, cardiac    TODAY'S VISIT:    Time In Session:  Start Time: 1000  Stop Time: 1100  Time Calculation (min): 60 min   General Information - 25 1003          Session Details    Document Type progress note     Mode of Treatment individual therapy        General Information    Referring Physician Dr. Raoul Saravia     History of present illness/functional impairment Pt is a 73 yo female with h/o BRCA1 breast cancer, B mastectomy with reconstruction, GERD, HTN, and orthostatic dizziness, who presents with diagnosis of imbalance.  Pt reports her biggest issue is dizziness related to orthostatic hypotension- has tried midodrine, and is followed by cardiology.  Neurology confirmed orthostatic hypotension with related imbalance, and referred pt to PT to improve her balance and safety.  Pt has limited function in her hands due to arthritis.  Pt notes her balance is worse- notices it during yoga, as she cannot hold the poses she is used to being able to complete.  She also walks 2.5-3 miles daily without issue.  [facility] her inner ear, and cleared her for vestibular or BPPV issues, but notes some visual motion  sensitivity in stores, with excessive head turning that exacerbates dizziness.  Pt experiences severe low back pain, right across top of pelvis.     Patient/Family/Caregiver Comments/Observations Pt reports her low back and hip pain have been severe, and she has been unable to do much over the weekend.  Indicates L SI joint region for pain today.     Existing Precautions/Restrictions fall;cardiac         Services    Do You Speak a Language Other Than English at Home? no        Behavioral Health Related Communication    Suicidal Ideation No                    Daily Falls Screen - 04/14/25 1003          Daily Falls Assessment    Patient reported fall since last visit No                    Pain/Vitals - 04/14/25 1003          Pain Assessment    Currently in pain Yes     Preferred Pain Scale number (Numeric Rating Pain Scale)     Pain Side/Orientation left     Pain: Body location Hip     Pain Rating (0-10): Pre Activity 5     Pain Rating (0-10): Post Activity 2        Pre Activity Vital Signs    /72     BP Location Left upper arm     BP Method Manual     Patient Position Sitting        Pain Intervention    Intervention  monitored, manual intervention for SI     Post Intervention Comments dec                    PT - 04/14/25 1003          Physical Therapy    Physical Therapy Specialty Traditional Neuro PT        PT Plan    Frequency of treatment 2 times/week     PT Duration 3 months     PT Cert From 03/13/25     PT Cert To 06/11/25     Date PT POC was sent to provider 03/13/25                    Assessment and Plan - 04/14/25 1003          Assessment    Plan of Care reviewed and patient/family in agreement Yes     System Pathology/Pathophysiology Noted musculoskeletal;neuromuscular;vestibular     Functional Limitations in Following Categories (PT Eval) self-care;home management;community/leisure     Rehab Potential/Prognosis good, to achieve stated therapy goals     Problem List decreased  strength;impaired sensory feedback;decreased endurance;dizziness;impaired balance;pain;motion sensitivity     Clinical Assessment Pt is a 75 yo female with h/h orthostatic hypotension, who is referred to OPPT for imbalance.  Pt has completed vestibular assessment, and demonstrates (-) BPPV all canals, but moderate motion sensitivity and  .  Pt also completed active stand test, and results indicate (-) POTS, with signs/symptoms consistent with orthostatic hypotension on first orthostasis assessment.  Pt also experiencing inc in L side low back pain, which limited her mobility over the weekend, but responded well to gentle AROM of LS/pelvis today.  Pt woud benefit from continuation of skilled services to aide in improving c/o dizziness, and cardiovascular conditioning, strength, balance and endurance to maximize safety, and allow for a return to PLOF.     Plan and Recommendations issue stretching HEP, Hip/core strengthening, vestibular adaptation/habituation, static/dynamic/reactive balance, cardio as tolerated                        OBJECTIVE MEASUREMENTS/DATA:    Outcome Measures    PT Outcome Measures - 04/14/25 1003          Objective Outcome Measures    6 Minute Walk Test 1376.3 ft without AD, HR 76 /74        Other Outcome Measures Used/Comments    Outcome Measures General Comments Active Stand Test: See flow sheet.  (-) for POTS                     ROM and MMT          3/13/2025    08:44   PT UE MMT Measurements   Right Shoulder Flexion (5/5) normal   Left Shoulder Flexion (5/5) normal   Right Shoulder ABD (5/5) normal   Left Shoulder ABD (5/5) normal   Right Shoulder IR (5/5) normal   Left Shoulder IR (5/5) normal   Right Shoulder ER (5/5) normal   Left Shoulder ER (5/5) normal   Right Elbow Flexion (5/5) normal   Left Elbow Flexion (5/5) normal   Right Elbow Extension (4+/5) good plus   Left Elbow Extension (4+/5) good plus   PT LE MMT   Right Hip Flexion (3+/5) fair plus   Left Hip Flexion (4/5) good  "  Right Hip Extension (4-/5) good minus   Left Hip Extension (4-/5) good minus   Right Hip ABD (4-/5) good minus   Left Hip ABD (4-/5) good minus   Right Knee Flexion (4/5) good   Left Knee Flexion (4+/5) good plus   Right Knee Extension (4+/5) good plus   Left Knee Extension (5/5) normal   Right Ankle DF (5/5) normal   Left Ankle DF (5/5) normal   Right Ankle PF (5/5) normal   Left Ankle PF (5/5) normal   Right Ankle Inversion (5/5) normal   Left Ankle Inversion (5/5) normal   Right Ankle Eversion (5/5) normal   Left Ankle Eversion (5/5) normal     Outcome Measures          3/13/2025    08:44 4/8/2025    17:49 4/14/2025    10:03   PT OBJECTIVE Outcome Measures   6 Minute Walk Test   1376.3 ft without AD, HR 76 /74   Gait Speed (m/sec) 1.1 m/sec       no AD     30 Second Sit to Stand  15 repetitions       inc fatigue in quads and SOB, no dizziness, from green chair wtih arms crossed    FGA 21       difficulty with HN>HT, tandem, EC, retro (2,3,2,2,3,3,1,1,2,2)     TUG - Results from red chair, no AD     PT SUBJECTIVE Outcome Measures   Other miniBEST TEST: 21/28 (2,2,1,1,1,0,2,2,2,2,1,2,2,1)       Vestibular Outcome Measures          3/13/2025    08:44 4/8/2025    17:00 4/14/2025    10:03   Vestibular   Corrective lenses  None    Comments  cataracts    Eye ROM  WNL    Convergence Vision  ABN    Comments  ~8\", uncomfortable    Divergent Vision  WNL    Pupil Alignment  WNL    Cover/Uncover  WNL    Cover/Cross Over  WNL    Smooth Pursuit/Small Target  WNL    Saccades  Abnormal    Comments  undershoot R->center, center->L, center to up circular path to R ( c shape)    Vestibular Ocular Reflex (VOR)  Abnormal    Comments  able to do H slow, but unable to inc pace without slip B    Spontaneous Evoked Nystagmus  WNL    Gaze-Evoked Nystagmus  Abnormal    Comments  2-3 beats at mid and end range, R beat in R gaze, L beat in L gaze    Head Thrust Test  WNL    VOR Cancellation  Abnormal    Comments  inc dizziness after " test    Spontaneous Nystagmus  WNL    Gaze Evoked Nystagmus  Abnormal    Comments  direction changing to R and L at mid and end range, stronger to L, L beat in up and down gaze    Convergence Spasm  WNL    SVINT (Skull Vibration-Induced Nystagmus Test)  WNL    Right Fabiano Hallpike  WNL;Modified with tilt table    Right [place] Hallpike Comment  lightheaded with return to sit    Left [place] Hallpike  WNL;Modified with tilt table    Left [place] Hallpike Comments  lightheaded with return to sit    Sit to Supine  WNL    Sit to Supine comments  lightheaded with return to sit    Horizontal Canal/Roll Test  WNL;Right;Left    Interpretation of Results  (-) BPPV all canals    BPPV Treatment  none    MODIFIED MSQ Percentage  18 %    MODIFIED MSQ number of provoking positions  9    SOT Composite Norms  WNL    SOT Composite Score  70    Somatosensory  WNL       89    Visual  WNL       93    Vestibular  Below norms       43    Visual Preference  WNL       119    Gait Speed (m/sec) 1.1 m/sec       no AD     FGA Score (out of 30 total) 21       difficulty with HN>HT, tandem, EC, retro (2,3,2,2,3,3,1,1,2,2)     6 Minute Walk Test   1376.3 ft without AD, HR 76 /74   SVINT  WNL         Today's Treatment::    Education provided:  Yes: See treatment log for details of education provided  Methods: Discussion  Readiness: acceptance and eager  Response: Verbalizes understanding    PT Neuro Exercises Current Session   Performed Today? (y/n)   THER ACT   (CPT 48670) TOTAL TIME FOR SESSION 8-22 min     Pain, vitals, subjective See flow yes   Orthostatic VS Supine 124/72  HR 75  Seated 120/72  HR 67 dizzy x15 sec  Stand 100/68 HR 66 brief dizzy  Stand x1 min 120/80 HR 67 no sx    Patient Education/HEP Pt educated on outcomes of testing, progress towards goals, plan for upcoming visits.  Pt verbalized understanding.    Pt educated on outcomes of vestibular assessment, motion sensitivity test and SOT.  She verbalized understanding. yes           Bed  "Mobility       Transfer training       Subjective Outcomes Measures       ABC Scale assess     PSFS assessed yes   SELF CARE/HOME MANAGEMENT TRAINING (53932) TOTAL TIME FOR SESSION               THER EX  (CPT 04579) TOTAL TIME FOR SESSION  23-37 Minutes       Education/HEP  Pt provided with initial HEP handout- see media.  Issued via handout and email, with blue TB.  Pt verbalized understanding.    STRETCHING       Stretching by patient Happy Baby x2 min  Prone on elbows x1 min  Darci pose x1 min    Provide handout for low back/hip stretches:  Open Book  LTR  Figure 4  DKTC/happy baby  Darci pose   Yes  Yes    Next visit   Stretching by therapist/PROM       CARDIOVASCULAR       Nu Step       Stationary Bike       Treadmill       Endurance Testing       Active Stand Test assessed yes   6mWT assessed yes   STRENGTH TRAINING       Standing Ther-Ex Minisquats  Lunges     Side-lying there-ex LLR iso hold x60\" B  Clamshell iso holds x60\" B    Supine Ther-Ex Bridge with iso abd with blue TB x10  Clamshell bridge with blue TB x10  First position bridge with blue TB x10   SLR  Pelvic tilts with 3\" hold x20  PPT with adductor ball squeeze x5\", x10  PPT with adductor ball squeeze and LAQ x10         yes   Prone Ther-Ex Quadruped if not dizzy    Core exercises 90/90 hold 2x30\"  LTR with TAC x10 B  90/90 heel taps    Functional Strength Testing       30 sec STS test (60y +) assessed    NEURO RE-ED  (CPT 33620) TOTAL TIME FOR SESSION   Not performed       Education/HEP  Pt provided with initial HEP for seated VORx1.  Issued handout and target- see media for details.  Pt verbalized understanding to instructions.    VESTIBULAR Vestibular-ocular eval done    SOT assessed    MSQ Assessed mMST    Adaptation Seated VORx1, B held in hand  Hx60\" @SSV  Vx60\" @SSV, slight dizzy    VOR-C- seated with B in hands  H x60\", inc to 5/10 dizzy    Compensation       Habiutation Trunk rotation ball pass  Pick ups     COORDINATION       Target " "Tapping       Coordination ladder       POSTURAL RE-ED       Seated & standing reaching for trunk strengthening       Lizett-scapular strengthening       PRE-GAIT ACTIVITIES        Tap-ups       Step-ups       Standing weight shifting       Step over/back       BALANCE TRAINING       Standing balance - static/dynamic       Airex       Rockerboard       Hurdles       Split Stance       Bosu       Reactive Balance       Slip        Dynamic gait        Side stepping/grapevine       Retro walking       Tandem Walking       Gait with eyes closed       Walking with ball toss       Balance Testing       miniBEST Test       FGA       TUG/COG TUG       10mWT       GAIT TRAINING  (CPT 71148) TOTAL TIME FOR SESSION       Education/HEP       Ambulation without AD       Stair negotiation       Curb negotiation       Ramp negotiation       Outdoor ambulation       BWS/vector training       MANUAL  (CPT 73717) TOTAL TIME FOR SESSION  8-22 min     Mobilization  Repeated motion assessment of LS:  FWD no change  EXT sig inc in pain  R SB no change  L SB, limited in ROM, inc pinch    SI joint assessment: no malalignment noted  yes            yes   MODALITIES TOTAL TIME FOR SESSION       Ice       Heat       ATTENDED E-STIM  (CPT 21838) TOTAL TIME FOR SESSION       Attended E-Stim       Unattended E-stim          ORTHOTICS TRAINING    TOTAL TIME FOR SESSION       Initial Encounter ([id number])       Subsequent Encounter ([id number])       Modified Motion Sensitivity Test (mMST)  Date: __4/8/25___________Baseline Level (0-10):___0__________MSQ:_____18______________  *All movements are done in standing, eyes are open, in front of a plain wall.   Symptoms must return to baseline before the next movement is performed.    Position Change Intensity Adjusted Intensity Duration Score   5x Horizontal head turns 1 1 5\"  0 1   5x Vertical head turns 0      5x Right diagonal head turns (upper left down to right) 1 1 7\"  1 2   5x Left diagonal head " "turns (upper right down to left) 1 1 5\"  0 1   5x Trunk Bends (bending knees reaching to floor) 3 3 9\"  1 4   5x Right quarter body turns (Look over right shoulder with trunk rotation, feet planted) 3 3 11\" 2 5   5x Left quarter body turns (Look over left shoulder with trunk rotation, feet planted) 3 3 15\"  2 5   1x 360 degree turn to right 2 2 6\"  1 3   1x 360 degree turn to left  3 3 9\"  1 4   5x VOR cancellation (following thumbs horizontally with head/trunk rotation x45 degrees each way) 2 2 7\"  1 3      Total Score =  28     Intensity: Scale from 0 to 10 (0 = No sx; 10 = severe)  Adjusted Intensity: Intensity Level - Baseline Level  Duration: Scale from 0 to 4 for return to baseline (<5 sec = 0; 5-10 sec = 1; 11-20 sec = 2; 21-30 sec = 3; >30 sec = 4)  Score: Adjusted Intensity + Duration    mMSQ = Total score x (# of positions) / 14.00                             MSQ = ______18____________                     __28____  X  ___9__   /14.00                                                                  0-10  mild range                                                                                                                                    11-30  moderate     severe     4/14/25                                                                                                                           POTS standing Assessment - (HR and BP):  Supine after 5 min:  HR 69  /70  Initial standing: HR 85   /74  Static standing x2min:  HR  85  BP  104/74  Static standing x5min:   HR: 81 BP:  100/70  Static standing x10min:  HR:  69  BP:  90/66  Purpose: to establish if >30bpm increase in adults/40pt in adolescents; rule out OH first.       Goals Addressed                      This Visit's Progress       Patient Stated      PT PSFS (pt-stated)   On track      Improve balance  4/14: Pt feels this is about the same.         Other      PT Goals         Short Term Goals Time Frame Result " Comment/Progress   Pt will complete ACTIVE STAND TEST 4-6 weeks met    Pt will complete the 30 second sit to stand test. (Age norm: Female Age 75-79 - 13 reps) 4-6 weeks met    Pt will improve by >=4 points on the FGA to improve mobility and reduce fall risk.  (FGA fall risk </=22; MDC = 4 points) 4-6 weeks ongoing    Pt will complete the 6MWT to assess endurance with household mobility.  (Age norm: Female Age 70-79 - 1545ft  (471m)   ) 4-6 weeks met    Patient will perform HEP with supervision level assistance. 4-6 weeks met    Patient will complete mMST, SOT and vestibular-ocular assessment/frenzel exam. 4-6 weeks met    Patient will improve by >/= 3 points on the Mini-BESTest to demonstrate improvement functional balance.  (Mini-BESTest fall risk </=17, MDC = 6 points) 4-6 weeks ongoing      Long Term Goals Time Frame Result Comment/Progress   Pt will report minimal to no dizziness with sit to stand/transitions 8-12 weeks ongoing    Pt will score >=28/30 on the FGA to improve stability and reduce fall risk.  (FGA fall risk </=22; MDC = 4 points) 8-12 weeks ongoing    Pt will further improve by 269ft or meet/exceed the age norm on the 6MWT to improve endurance with household and community mobility.  (Age norm: Female Age 70-79 - 1545ft  (471m)   ) 8-12 weeks ongoing    Patient will perform home/community exercise program independently.   8-12 weeks ongoing    Patient will score >/=27/28 on the Mini-BESTest to demonstrate decreased risk of falls.  (Mini-BESTest fall risk </=17, MDC = 6 points) 8-12 weeks ongoing    Patient will be able to tolerate >=20 minutes of moderate (Jesse 11-14/20) cardiovascular conditioning with vital signs stable to improve endurance with household and community mobility. 8-12 weeks ongoing    Patient to meet or exceed age-norm score on the 30s STS test to demonstrate improvement in functional LE strength (Age norm: Female Age 75-79 - 13 reps) 8-12 weeks ongoing                         Live Agrawal, PT

## 2025-04-14 NOTE — OP PT TREATMENT LOG
"PT Neuro Exercises Current Session   Performed Today? (y/n)   THER ACT   (CPT 37995) TOTAL TIME FOR SESSION 8-22 min     Pain, vitals, subjective See flow yes   Orthostatic VS Supine 124/72  HR 75  Seated 120/72  HR 67 dizzy x15 sec  Stand 100/68 HR 66 brief dizzy  Stand x1 min 120/80 HR 67 no sx    Patient Education/HEP Pt educated on outcomes of testing, progress towards goals, plan for upcoming visits.  Pt verbalized understanding.    Pt educated on outcomes of vestibular assessment, motion sensitivity test and SOT.  She verbalized understanding. yes           Bed Mobility       Transfer training       Subjective Outcomes Measures       ABC Scale assess     PSFS assessed yes   SELF CARE/HOME MANAGEMENT TRAINING (22160) TOTAL TIME FOR SESSION               THER EX  (CPT 08619) TOTAL TIME FOR SESSION  23-37 Minutes       Education/HEP  Pt provided with initial HEP handout- see media.  Issued via handout and email, with blue TB.  Pt verbalized understanding.    STRETCHING       Stretching by patient Happy Baby x2 min  Prone on elbows x1 min  Darci pose x1 min    Provide handout for low back/hip stretches:  Open Book  LTR  Figure 4  DKTC/happy baby  Darci pose   Yes  Yes    Next visit   Stretching by therapist/PROM       CARDIOVASCULAR       Nu Step       Stationary Bike       Treadmill       Endurance Testing       Active Stand Test assessed yes   6mWT assessed yes   STRENGTH TRAINING       Standing Ther-Ex Minisquats  Lunges     Side-lying there-ex LLR iso hold x60\" B  Clamshell iso holds x60\" B    Supine Ther-Ex Bridge with iso abd with blue TB x10  Clamshell bridge with blue TB x10  First position bridge with blue TB x10   SLR  Pelvic tilts with 3\" hold x20  PPT with adductor ball squeeze x5\", x10  PPT with adductor ball squeeze and LAQ x10         yes   Prone Ther-Ex Quadruped if not dizzy    Core exercises 90/90 hold 2x30\"  LTR with TAC x10 B  90/90 heel taps    Functional Strength Testing       30 sec STS " "test (60y +) assessed    NEURO RE-ED  (CPT 36497) TOTAL TIME FOR SESSION   Not performed       Education/HEP  Pt provided with initial HEP for seated VORx1.  Issued handout and target- see media for details.  Pt verbalized understanding to instructions.    VESTIBULAR Vestibular-ocular eval done    SOT assessed    MSQ Assessed mMST    Adaptation Seated VORx1, B held in hand  Hx60\" @SSV  Vx60\" @SSV, slight dizzy    VOR-C- seated with B in hands  H x60\", inc to 5/10 dizzy    Compensation       Habiutation Trunk rotation ball pass  Pick ups     COORDINATION       Target Tapping       Coordination ladder       POSTURAL RE-ED       Seated & standing reaching for trunk strengthening       Lizett-scapular strengthening       PRE-GAIT ACTIVITIES        Tap-ups       Step-ups       Standing weight shifting       Step over/back       BALANCE TRAINING       Standing balance - static/dynamic       Airex       Rockerboard       Hurdles       Split Stance       Bosu       Reactive Balance       Slip        Dynamic gait        Side stepping/grapevine       Retro walking       Tandem Walking       Gait with eyes closed       Walking with ball toss       Balance Testing       miniBEST Test       FGA       TUG/COG TUG       10mWT       GAIT TRAINING  (CPT 68782) TOTAL TIME FOR SESSION       Education/HEP       Ambulation without AD       Stair negotiation       Curb negotiation       Ramp negotiation       Outdoor ambulation       BWS/vector training       MANUAL  (CPT 67247) TOTAL TIME FOR SESSION  8-22 min     Mobilization  Repeated motion assessment of LS:  FWD no change  EXT sig inc in pain  R SB no change  L SB, limited in ROM, inc pinch    SI joint assessment: no malalignment noted  yes            yes   MODALITIES TOTAL TIME FOR SESSION       Ice       Heat       ATTENDED E-STIM  (CPT 66988) TOTAL TIME FOR SESSION       Attended E-Stim       Unattended E-stim          ORTHOTICS TRAINING    TOTAL TIME FOR SESSION     " "  Initial Encounter ([id number])       Subsequent Encounter ([id number])       Modified Motion Sensitivity Test (mMST)  Date: __4/8/25___________Baseline Level (0-10):___0__________MSQ:_____18______________  *All movements are done in standing, eyes are open, in front of a plain wall.   Symptoms must return to baseline before the next movement is performed.    Position Change Intensity Adjusted Intensity Duration Score   5x Horizontal head turns 1 1 5\"  0 1   5x Vertical head turns 0      5x Right diagonal head turns (upper left down to right) 1 1 7\"  1 2   5x Left diagonal head turns (upper right down to left) 1 1 5\"  0 1   5x Trunk Bends (bending knees reaching to floor) 3 3 9\"  1 4   5x Right quarter body turns (Look over right shoulder with trunk rotation, feet planted) 3 3 11\" 2 5   5x Left quarter body turns (Look over left shoulder with trunk rotation, feet planted) 3 3 15\"  2 5   1x 360 degree turn to right 2 2 6\"  1 3   1x 360 degree turn to left  3 3 9\"  1 4   5x VOR cancellation (following thumbs horizontally with head/trunk rotation x45 degrees each way) 2 2 7\"  1 3      Total Score =  28     Intensity: Scale from 0 to 10 (0 = No sx; 10 = severe)  Adjusted Intensity: Intensity Level - Baseline Level  Duration: Scale from 0 to 4 for return to baseline (<5 sec = 0; 5-10 sec = 1; 11-20 sec = 2; 21-30 sec = 3; >30 sec = 4)  Score: Adjusted Intensity + Duration    mMSQ = Total score x (# of positions) / 14.00                             MSQ = ______18____________                     __28____  X  ___9__ /14.00                                                                  0-10  mild range                                                                                                                                    11-30  moderate     severe     4/14/25                                                                                                                           POTS standing Assessment - (HR and " BP):  Supine after 5 min:  HR 69  /70  Initial standing: HR 85   /74  Static standing x2min:  HR  85  BP  104/74  Static standing x5min:   HR: 81 BP:  100/70  Static standing x10min:  HR:  69  BP:  90/66  Purpose: to establish if >30bpm increase in adults/40pt in adolescents; rule out OH first.

## 2025-04-18 ENCOUNTER — HOSPITAL ENCOUNTER (OUTPATIENT)
Dept: PHYSICAL THERAPY | Facility: REHABILITATION | Age: 75
Setting detail: THERAPIES SERIES
Discharge: HOME | End: 2025-04-18
Attending: PSYCHIATRY & NEUROLOGY
Payer: MEDICARE

## 2025-04-18 DIAGNOSIS — R26.81 UNSTEADINESS ON FEET: Primary | ICD-10-CM

## 2025-04-18 PROCEDURE — 97140 MANUAL THERAPY 1/> REGIONS: CPT | Mod: GP,CQ

## 2025-04-18 PROCEDURE — 97110 THERAPEUTIC EXERCISES: CPT | Mod: GP,CQ

## 2025-04-18 NOTE — OP PT TREATMENT LOG
"PT Neuro Exercises Current Session   Performed Today? (y/n)   THER ACT   (CPT 57643) TOTAL TIME FOR SESSION 0-7 min     Pain, vitals, subjective See flow yes   Orthostatic VS Seated 120/72    Standing x 1min 126/70 Yes  yes   Patient Education/HEP Pt educated on outcomes of testing, progress towards goals, plan for upcoming visits.  Pt verbalized understanding.    Pt educated on outcomes of vestibular assessment, motion sensitivity test and SOT.  She verbalized understanding.            Bed Mobility  Cues for log rolling technique   yes   Transfer training       Subjective Outcomes Measures       ABC Scale assess     PSFS assessed    SELF CARE/HOME MANAGEMENT TRAINING (42534) TOTAL TIME FOR SESSION               THER EX  (CPT 55263) TOTAL TIME FOR SESSION  38-52 Minutes       Education/HEP  Pt provided with initial HEP handout- see media.  Issued via handout and email, with blue TB.  Pt verbalized understanding.    STRETCHING       Stretching by patient Happy Baby x2 min  Prone on elbows x1 min  Darci pose x1 min    Provide handout for low back/hip stretches:  Open Book, x5 b/l  LTR  Figure 4  DKTC/happy baby  Darci pose Yes  No  no      Yes  yes  No  Yes  No   Stretching by therapist/PROM B/L Hamstring, 3x 30 sec     CARDIOVASCULAR       Nu Step       Stationary Bike       Treadmill       Endurance Testing       Active Stand Test assessed    6mWT assessed    STRENGTH TRAINING       Standing Ther-Ex Minisquats  Lunges     Side-lying there-ex LLR iso hold x60\" B  Clamshell iso holds x60\" B    Supine Ther-Ex Bridge with iso abd with PTB x10  Clamshell bridge with PTB x10  First position bridge with blue TB x10   SLR  Pelvic tilts with 3\" hold x20  PPT with adductor ball squeeze x5\", x10  PPT with adductor ball squeeze and LAQ x10 yes  yes  no    Yes  Yes  yes   Prone Ther-Ex Quadruped if not dizzy    Core exercises 90/90 hold 2x30\"  TAC with 3 sec hold, x10  TAC with pelvic tilt with 3 sec hold, x10  LTR with TAC " "x10 B  90/90 heel taps No  Yes  Yes  Yes  No   Functional Strength Testing       30 sec STS test (60y +) assessed    NEURO RE-ED  (CPT 29244) TOTAL TIME FOR SESSION   Not performed       Education/HEP  Pt provided with initial HEP for seated VORx1.  Issued handout and target- see media for details.  Pt verbalized understanding to instructions.    VESTIBULAR Vestibular-ocular eval done    SOT assessed    MSQ Assessed mMST    Adaptation Seated VORx1, B held in hand  Hx60\" @SSV  Vx60\" @SSV, slight dizzy    VOR-C- seated with B in hands  H x60\", inc to 5/10 dizzy    Compensation       Habiutation Trunk rotation ball pass  Pick ups     COORDINATION       Target Tapping       Coordination ladder       POSTURAL RE-ED       Seated & standing reaching for trunk strengthening       Lizett-scapular strengthening       PRE-GAIT ACTIVITIES        Tap-ups       Step-ups       Standing weight shifting       Step over/back       BALANCE TRAINING       Standing balance - static/dynamic       Airex       Rockerboard       Hurdles       Split Stance       Bosu       Reactive Balance       Slip        Dynamic gait        Side stepping/grapevine       Retro walking       Tandem Walking       Gait with eyes closed       Walking with ball toss       Balance Testing       miniBEST Test       FGA       TUG/COG TUG       10mWT       GAIT TRAINING  (CPT 45650) TOTAL TIME FOR SESSION       Education/HEP       Ambulation without AD       Stair negotiation       Curb negotiation       Ramp negotiation       Outdoor ambulation       BWS/vector training       MANUAL  (CPT 63091) TOTAL TIME FOR SESSION  8-22 min     Mobilization  Repeated motion assessment of LS:  FWD no change  EXT sig inc in pain  R SB no change  L SB, limited in ROM, inc pinch    SI joint assessment: no malalignment noted    Gentle STM to b/l Latissimus                  yes                   MODALITIES TOTAL TIME FOR SESSION       Ice       Heat       ATTENDED E-STIM  (CPT " "46397) TOTAL TIME FOR SESSION       Attended E-Stim       Unattended E-stim          ORTHOTICS TRAINING    TOTAL TIME FOR SESSION       Initial Encounter ([id number])       Subsequent Encounter ([id number])       Modified Motion Sensitivity Test (mMST)  Date: __4/8/25___________Baseline Level (0-10):___0__________MSQ:_____18______________  *All movements are done in standing, eyes are open, in front of a plain wall.   Symptoms must return to baseline before the next movement is performed.    Position Change Intensity Adjusted Intensity Duration Score   5x Horizontal head turns 1 1 5\"  0 1   5x Vertical head turns 0      5x Right diagonal head turns (upper left down to right) 1 1 7\"  1 2   5x Left diagonal head turns (upper right down to left) 1 1 5\"  0 1   5x Trunk Bends (bending knees reaching to floor) 3 3 9\"  1 4   5x Right quarter body turns (Look over right shoulder with trunk rotation, feet planted) 3 3 11\" 2 5   5x Left quarter body turns (Look over left shoulder with trunk rotation, feet planted) 3 3 15\"  2 5   1x 360 degree turn to right 2 2 6\"  1 3   1x 360 degree turn to left  3 3 9\"  1 4   5x VOR cancellation (following thumbs horizontally with head/trunk rotation x45 degrees each way) 2 2 7\"  1 3      Total Score =  28     Intensity: Scale from 0 to 10 (0 = No sx; 10 = severe)  Adjusted Intensity: Intensity Level - Baseline Level  Duration: Scale from 0 to 4 for return to baseline (<5 sec = 0; 5-10 sec = 1; 11-20 sec = 2; 21-30 sec = 3; >30 sec = 4)  Score: Adjusted Intensity + Duration    mMSQ = Total score x (# of positions) / 14.00                             MSQ = ______18____________                     __28____  X  ___9__   /14.00                                                                  0-10  mild range                                                                                                                                    11-30  moderate     severe     4/14/25                         "                                                                                                   POTS standing Assessment - (HR and BP):  Supine after 5 min:  HR 69  /70  Initial standing: HR 85   /74  Static standing x2min:  HR  85  BP  104/74  Static standing x5min:   HR: 81 BP:  100/70  Static standing x10min:  HR:  69  BP:  90/66  Purpose: to establish if >30bpm increase in adults/40pt in adolescents; rule out OH first.

## 2025-04-29 ENCOUNTER — HOSPITAL ENCOUNTER (OUTPATIENT)
Dept: PHYSICAL THERAPY | Facility: REHABILITATION | Age: 75
Setting detail: THERAPIES SERIES
Discharge: HOME | End: 2025-04-29
Attending: PSYCHIATRY & NEUROLOGY
Payer: MEDICARE

## 2025-04-29 DIAGNOSIS — Z74.09 IMPAIRED FUNCTIONAL MOBILITY, BALANCE, GAIT, AND ENDURANCE: ICD-10-CM

## 2025-04-29 DIAGNOSIS — R26.81 UNSTEADINESS ON FEET: Primary | ICD-10-CM

## 2025-04-29 DIAGNOSIS — R42 DIZZINESS: ICD-10-CM

## 2025-04-29 PROCEDURE — 97110 THERAPEUTIC EXERCISES: CPT | Mod: GP

## 2025-04-29 PROCEDURE — 97112 NEUROMUSCULAR REEDUCATION: CPT | Mod: GP

## 2025-04-29 PROCEDURE — 97010 HOT OR COLD PACKS THERAPY: CPT | Mod: GP

## 2025-05-01 ENCOUNTER — HOSPITAL ENCOUNTER (OUTPATIENT)
Dept: PHYSICAL THERAPY | Facility: REHABILITATION | Age: 75
Setting detail: THERAPIES SERIES
Discharge: HOME | End: 2025-05-01
Attending: PSYCHIATRY & NEUROLOGY
Payer: MEDICARE

## 2025-05-01 DIAGNOSIS — Z74.09 IMPAIRED FUNCTIONAL MOBILITY, BALANCE, GAIT, AND ENDURANCE: ICD-10-CM

## 2025-05-01 DIAGNOSIS — R42 DIZZINESS: ICD-10-CM

## 2025-05-01 DIAGNOSIS — R26.81 UNSTEADINESS ON FEET: Primary | ICD-10-CM

## 2025-05-01 PROCEDURE — 97110 THERAPEUTIC EXERCISES: CPT | Mod: GP

## 2025-05-01 PROCEDURE — 97112 NEUROMUSCULAR REEDUCATION: CPT | Mod: GP

## 2025-05-01 PROCEDURE — 97530 THERAPEUTIC ACTIVITIES: CPT | Mod: GP

## 2025-05-06 ENCOUNTER — HOSPITAL ENCOUNTER (OUTPATIENT)
Dept: PHYSICAL THERAPY | Facility: REHABILITATION | Age: 75
Setting detail: THERAPIES SERIES
Discharge: HOME | End: 2025-05-06
Attending: PSYCHIATRY & NEUROLOGY
Payer: MEDICARE

## 2025-05-06 DIAGNOSIS — Z74.09 IMPAIRED FUNCTIONAL MOBILITY, BALANCE, GAIT, AND ENDURANCE: ICD-10-CM

## 2025-05-06 DIAGNOSIS — R42 DIZZINESS: ICD-10-CM

## 2025-05-06 DIAGNOSIS — R26.81 UNSTEADINESS ON FEET: Primary | ICD-10-CM

## 2025-05-06 PROCEDURE — 97530 THERAPEUTIC ACTIVITIES: CPT | Mod: GP,CQ

## 2025-05-06 PROCEDURE — 97110 THERAPEUTIC EXERCISES: CPT | Mod: GP,CQ

## 2025-05-06 PROCEDURE — 97112 NEUROMUSCULAR REEDUCATION: CPT | Mod: GP,CQ

## 2025-05-07 ENCOUNTER — HOSPITAL ENCOUNTER (OUTPATIENT)
Dept: PHYSICAL THERAPY | Facility: REHABILITATION | Age: 75
Setting detail: THERAPIES SERIES
Discharge: HOME | End: 2025-05-07
Attending: PSYCHIATRY & NEUROLOGY
Payer: MEDICARE

## 2025-05-07 DIAGNOSIS — R42 DIZZINESS: ICD-10-CM

## 2025-05-07 DIAGNOSIS — R26.81 UNSTEADINESS ON FEET: Primary | ICD-10-CM

## 2025-05-07 DIAGNOSIS — Z74.09 IMPAIRED FUNCTIONAL MOBILITY, BALANCE, GAIT, AND ENDURANCE: ICD-10-CM

## 2025-05-07 PROCEDURE — 97112 NEUROMUSCULAR REEDUCATION: CPT | Mod: GP

## 2025-05-07 PROCEDURE — 97530 THERAPEUTIC ACTIVITIES: CPT | Mod: GP

## 2025-05-07 PROCEDURE — 97110 THERAPEUTIC EXERCISES: CPT | Mod: GP

## 2025-05-14 ENCOUNTER — HOSPITAL ENCOUNTER (OUTPATIENT)
Dept: PHYSICAL THERAPY | Facility: REHABILITATION | Age: 75
Setting detail: THERAPIES SERIES
Discharge: HOME | End: 2025-05-14
Attending: PSYCHIATRY & NEUROLOGY
Payer: MEDICARE

## 2025-05-14 DIAGNOSIS — R42 DIZZINESS: ICD-10-CM

## 2025-05-14 DIAGNOSIS — R26.81 UNSTEADINESS ON FEET: Primary | ICD-10-CM

## 2025-05-14 DIAGNOSIS — Z74.09 IMPAIRED FUNCTIONAL MOBILITY, BALANCE, GAIT, AND ENDURANCE: ICD-10-CM

## 2025-05-14 PROCEDURE — 97112 NEUROMUSCULAR REEDUCATION: CPT | Mod: GP

## 2025-05-14 PROCEDURE — 97010 HOT OR COLD PACKS THERAPY: CPT | Mod: GP

## 2025-05-14 PROCEDURE — 97110 THERAPEUTIC EXERCISES: CPT | Mod: GP

## 2025-05-16 ENCOUNTER — HOSPITAL ENCOUNTER (OUTPATIENT)
Dept: PHYSICAL THERAPY | Facility: REHABILITATION | Age: 75
Setting detail: THERAPIES SERIES
Discharge: HOME | End: 2025-05-16
Attending: PSYCHIATRY & NEUROLOGY
Payer: MEDICARE

## 2025-05-16 DIAGNOSIS — R26.81 UNSTEADINESS ON FEET: Primary | ICD-10-CM

## 2025-05-16 DIAGNOSIS — Z74.09 IMPAIRED FUNCTIONAL MOBILITY, BALANCE, GAIT, AND ENDURANCE: ICD-10-CM

## 2025-05-16 DIAGNOSIS — R42 DIZZINESS: ICD-10-CM

## 2025-05-16 PROCEDURE — 97112 NEUROMUSCULAR REEDUCATION: CPT | Mod: GP

## 2025-05-16 PROCEDURE — 97530 THERAPEUTIC ACTIVITIES: CPT | Mod: GP

## 2025-05-21 ENCOUNTER — HOSPITAL ENCOUNTER (OUTPATIENT)
Dept: PHYSICAL THERAPY | Facility: REHABILITATION | Age: 75
Setting detail: THERAPIES SERIES
Discharge: HOME | End: 2025-05-21
Attending: PSYCHIATRY & NEUROLOGY
Payer: MEDICARE

## 2025-05-21 DIAGNOSIS — R26.81 UNSTEADINESS ON FEET: Primary | ICD-10-CM

## 2025-05-21 DIAGNOSIS — Z74.09 IMPAIRED FUNCTIONAL MOBILITY, BALANCE, GAIT, AND ENDURANCE: ICD-10-CM

## 2025-05-21 DIAGNOSIS — R42 DIZZINESS: ICD-10-CM

## 2025-05-21 PROCEDURE — 97530 THERAPEUTIC ACTIVITIES: CPT | Mod: GP

## 2025-05-30 ENCOUNTER — HOSPITAL ENCOUNTER (OUTPATIENT)
Dept: PHYSICAL THERAPY | Facility: REHABILITATION | Age: 75
Setting detail: THERAPIES SERIES
Discharge: HOME | End: 2025-05-30
Attending: PSYCHIATRY & NEUROLOGY
Payer: MEDICARE

## 2025-05-30 DIAGNOSIS — R42 DIZZINESS: ICD-10-CM

## 2025-05-30 DIAGNOSIS — R26.81 UNSTEADINESS ON FEET: Primary | ICD-10-CM

## 2025-05-30 DIAGNOSIS — Z74.09 IMPAIRED FUNCTIONAL MOBILITY, BALANCE, GAIT, AND ENDURANCE: ICD-10-CM

## 2025-05-30 PROCEDURE — 97530 THERAPEUTIC ACTIVITIES: CPT | Mod: GP

## 2025-05-30 PROCEDURE — 97112 NEUROMUSCULAR REEDUCATION: CPT | Mod: GP

## 2025-06-02 RX ORDER — TRAZODONE HYDROCHLORIDE 100 MG/1
100 TABLET ORAL SEE ADMIN INSTRUCTIONS
Qty: 90 TABLET | Refills: 3 | Status: SHIPPED | OUTPATIENT
Start: 2025-06-02

## 2025-06-02 NOTE — TELEPHONE ENCOUNTER
Medicine Refill Request    Last Office Visit: 1/16/2025   Last Consult Visit: 1/16/2024  Last Telemedicine Visit: Visit date not found    Next Appointment: Visit date not found      Current Outpatient Medications:     atorvastatin (LIPITOR) 10 mg tablet, Take 10 mg by mouth daily., Disp: , Rfl:     meloxicam (MOBIC) 7.5 mg tablet, Take 7.5 mg by mouth daily., Disp: , Rfl:     omeprazole (PriLOSEC) 40 mg capsule, Take 40 mg by mouth 2 (two) times a day., Disp: , Rfl:     traZODone (DESYREL) 100 mg tablet, Take 1 tablet (100 mg total) by mouth nightly., Disp: 90 tablet, Rfl: 3    BP Readings from Last 3 Encounters:   01/16/25 136/82   12/10/24 122/78   07/16/24 126/60       Recent Lab results:  Lab Results   Component Value Date    CHOL 152 01/16/2025   ,   Lab Results   Component Value Date    HDL 70 01/16/2025   ,   Lab Results   Component Value Date    LDLCALC 68 01/16/2025   ,   Lab Results   Component Value Date    TRIG 69 01/16/2025        Lab Results   Component Value Date    GLUCOSE 92 01/16/2025   ,   Lab Results   Component Value Date    HGBA1C 5.4 01/16/2025         Lab Results   Component Value Date    CREATININE 0.7 01/16/2025       Lab Results   Component Value Date    TSH 2.16 01/16/2025           Lab Results   Component Value Date    HGBA1C 5.4 01/16/2025

## 2025-07-21 RX ORDER — AZITHROMYCIN 250 MG/1
TABLET, FILM COATED ORAL
Qty: 6 TABLET | Refills: 0 | Status: SHIPPED | OUTPATIENT
Start: 2025-07-21

## 2025-07-21 RX ORDER — NITROFURANTOIN 25; 75 MG/1; MG/1
100 CAPSULE ORAL 2 TIMES DAILY
Qty: 14 CAPSULE | Refills: 0 | Status: SHIPPED | OUTPATIENT
Start: 2025-07-21 | End: 2025-07-28

## 2025-08-27 PROCEDURE — 99490 CHRNC CARE MGMT STAFF 1ST 20: CPT

## 2025-09-02 ENCOUNTER — OFFICE VISIT (OUTPATIENT)
Facility: HOSPITAL | Age: 75
End: 2025-09-02
Attending: INTERNAL MEDICINE
Payer: MEDICARE

## 2025-09-02 VITALS
DIASTOLIC BLOOD PRESSURE: 57 MMHG | SYSTOLIC BLOOD PRESSURE: 109 MMHG | OXYGEN SATURATION: 99 % | HEIGHT: 60 IN | WEIGHT: 127.2 LBS | BODY MASS INDEX: 24.97 KG/M2 | TEMPERATURE: 95.9 F | HEART RATE: 74 BPM

## 2025-09-02 DIAGNOSIS — C50.912 MALIGNANT NEOPLASM OF LEFT BREAST IN FEMALE, ESTROGEN RECEPTOR NEGATIVE, UNSPECIFIED SITE OF BREAST (CMS/HCC): ICD-10-CM

## 2025-09-02 DIAGNOSIS — Z17.1 MALIGNANT NEOPLASM OF LEFT BREAST IN FEMALE, ESTROGEN RECEPTOR NEGATIVE, UNSPECIFIED SITE OF BREAST (CMS/HCC): ICD-10-CM

## 2025-09-02 PROCEDURE — G0463 HOSPITAL OUTPT CLINIC VISIT: HCPCS | Performed by: INTERNAL MEDICINE

## 2025-09-02 PROCEDURE — 99214 OFFICE O/P EST MOD 30 MIN: CPT | Performed by: INTERNAL MEDICINE

## 2025-09-02 ASSESSMENT — ENCOUNTER SYMPTOMS
NAUSEA: 0
HEMATURIA: 0
FATIGUE: 0
DIARRHEA: 0
VOICE CHANGE: 0
NERVOUS/ANXIOUS: 0
ABDOMINAL PAIN: 0
COUGH: 0
DIZZINESS: 0
HEMOPTYSIS: 0
SHORTNESS OF BREATH: 0
APPETITE CHANGE: 0
FEVER: 0
ADENOPATHY: 0
DYSURIA: 0
LEG SWELLING: 0
CHEST TIGHTNESS: 0
CONSTIPATION: 0
BACK PAIN: 0
VOMITING: 0

## 2025-09-02 ASSESSMENT — PAIN SCALES - GENERAL: PAINLEVEL_OUTOF10: 0-NO PAIN
